# Patient Record
Sex: FEMALE | Race: NATIVE HAWAIIAN OR OTHER PACIFIC ISLANDER
[De-identification: names, ages, dates, MRNs, and addresses within clinical notes are randomized per-mention and may not be internally consistent; named-entity substitution may affect disease eponyms.]

---

## 2018-10-15 ENCOUNTER — HOSPITAL ENCOUNTER (OUTPATIENT)
Dept: HOSPITAL 75 - RAD | Age: 28
End: 2018-10-15
Attending: FAMILY MEDICINE
Payer: COMMERCIAL

## 2018-10-15 DIAGNOSIS — Z3A.16: ICD-10-CM

## 2018-10-15 DIAGNOSIS — Z36.89: Primary | ICD-10-CM

## 2018-10-15 PROCEDURE — 76801 OB US < 14 WKS SINGLE FETUS: CPT

## 2018-10-15 NOTE — DIAGNOSTIC IMAGING REPORT
PROCEDURE: US OB SINGLE FETUS <14 WKS.



TECHNIQUE: Multiple real-time grayscale images were obtained over

the gravid uterus in various projections. 



INDICATION: Assessment of fetal date.



FINDINGS: Intrauterine gestation is seen in variable

presentation. Amniotic fluid index is unremarkable. Fetal cardiac

activity is present with a rate of 142 beats per minute. There is

no evidence of placenta previa. No placental hematoma is

identified. Maternal ovaries are not visualized. Fetal biometry

indicates gestational age of 16 weeks 1 day.



IMPRESSION: Unremarkable obstetric ultrasound with estimated

gestational age of 16 weeks 1 day. Sonographic EDC is 03/31/2019.

Anatomic survey could be performed later in the second trimester.



Dictated by: 



  Dictated on workstation # [id number]

## 2018-10-31 ENCOUNTER — HOSPITAL ENCOUNTER (EMERGENCY)
Dept: HOSPITAL 75 - ER | Age: 28
Discharge: HOME | End: 2018-10-31
Payer: SELF-PAY

## 2018-10-31 VITALS — HEIGHT: 65 IN | BODY MASS INDEX: 26.66 KG/M2 | WEIGHT: 160 LBS

## 2018-10-31 VITALS — DIASTOLIC BLOOD PRESSURE: 72 MMHG | SYSTOLIC BLOOD PRESSURE: 113 MMHG

## 2018-10-31 DIAGNOSIS — O23.41: ICD-10-CM

## 2018-10-31 DIAGNOSIS — O20.9: Primary | ICD-10-CM

## 2018-10-31 DIAGNOSIS — Z3A.17: ICD-10-CM

## 2018-10-31 LAB
ALBUMIN SERPL-MCNC: 4 GM/DL (ref 3.2–4.5)
ALP SERPL-CCNC: 60 U/L (ref 40–136)
ALT SERPL-CCNC: 14 U/L (ref 0–55)
APTT PPP: YELLOW S
BACTERIA #/AREA URNS HPF: (no result) /HPF
BASOPHILS # BLD AUTO: 0 10^3/UL (ref 0–0.1)
BASOPHILS NFR BLD AUTO: 0 % (ref 0–10)
BILIRUB SERPL-MCNC: 0.2 MG/DL (ref 0.1–1)
BILIRUB UR QL STRIP: NEGATIVE
BUN/CREAT SERPL: 11
CALCIUM SERPL-MCNC: 9.2 MG/DL (ref 8.5–10.1)
CAOX CRY #/AREA URNS LPF: (no result) /LPF
CHLORIDE SERPL-SCNC: 107 MMOL/L (ref 98–107)
CO2 SERPL-SCNC: 21 MMOL/L (ref 21–32)
CREAT SERPL-MCNC: 0.66 MG/DL (ref 0.6–1.3)
EOSINOPHIL # BLD AUTO: 0.3 10^3/UL (ref 0–0.3)
EOSINOPHIL NFR BLD AUTO: 4 % (ref 0–10)
ERYTHROCYTE [DISTWIDTH] IN BLOOD BY AUTOMATED COUNT: 19.5 % (ref 10–14.5)
FIBRINOGEN PPP-MCNC: (no result) MG/DL
GFR SERPLBLD BASED ON 1.73 SQ M-ARVRAT: > 60 ML/MIN
GLUCOSE SERPL-MCNC: 80 MG/DL (ref 70–105)
GLUCOSE UR STRIP-MCNC: NEGATIVE MG/DL
HCT VFR BLD CALC: 25 % (ref 35–52)
HGB BLD-MCNC: 7.4 G/DL (ref 11.5–16)
KETONES UR QL STRIP: (no result)
LEUKOCYTE ESTERASE UR QL STRIP: (no result)
LYMPHOCYTES # BLD AUTO: 1.8 X 10^3 (ref 1–4)
LYMPHOCYTES NFR BLD AUTO: 26 % (ref 12–44)
MANUAL DIFFERENTIAL PERFORMED BLD QL: NO
MCH RBC QN AUTO: 21 PG (ref 25–34)
MCHC RBC AUTO-ENTMCNC: 29 G/DL (ref 32–36)
MCV RBC AUTO: 71 FL (ref 80–99)
MONOCYTES # BLD AUTO: 0.5 X 10^3 (ref 0–1)
MONOCYTES NFR BLD AUTO: 8 % (ref 0–12)
NEUTROPHILS # BLD AUTO: 4.3 X 10^3 (ref 1.8–7.8)
NEUTROPHILS NFR BLD AUTO: 62 % (ref 42–75)
NITRITE UR QL STRIP: NEGATIVE
PH UR STRIP: 5 [PH] (ref 5–9)
PLATELET # BLD: 441 10^3/UL (ref 130–400)
PMV BLD AUTO: 9 FL (ref 7.4–10.4)
POTASSIUM SERPL-SCNC: 3.5 MMOL/L (ref 3.6–5)
PROT SERPL-MCNC: 7.5 GM/DL (ref 6.4–8.2)
PROT UR QL STRIP: (no result)
RBC # BLD AUTO: 3.59 10^6/UL (ref 4.35–5.85)
RBC #/AREA URNS HPF: (no result) /HPF
SODIUM SERPL-SCNC: 138 MMOL/L (ref 135–145)
SP GR UR STRIP: 1.03 (ref 1.02–1.02)
SQUAMOUS #/AREA URNS HPF: (no result) /HPF
UROBILINOGEN UR-MCNC: 1 MG/DL
WBC # BLD AUTO: 6.9 10^3/UL (ref 4.3–11)
WBC #/AREA URNS HPF: (no result) /HPF

## 2018-10-31 PROCEDURE — 87077 CULTURE AEROBIC IDENTIFY: CPT

## 2018-10-31 PROCEDURE — 86901 BLOOD TYPING SEROLOGIC RH(D): CPT

## 2018-10-31 PROCEDURE — 82728 ASSAY OF FERRITIN: CPT

## 2018-10-31 PROCEDURE — 80053 COMPREHEN METABOLIC PANEL: CPT

## 2018-10-31 PROCEDURE — 81000 URINALYSIS NONAUTO W/SCOPE: CPT

## 2018-10-31 PROCEDURE — 86900 BLOOD TYPING SEROLOGIC ABO: CPT

## 2018-10-31 PROCEDURE — 36415 COLL VENOUS BLD VENIPUNCTURE: CPT

## 2018-10-31 PROCEDURE — 83540 ASSAY OF IRON: CPT

## 2018-10-31 PROCEDURE — 84702 CHORIONIC GONADOTROPIN TEST: CPT

## 2018-10-31 PROCEDURE — 85025 COMPLETE CBC W/AUTO DIFF WBC: CPT

## 2018-10-31 PROCEDURE — 87088 URINE BACTERIA CULTURE: CPT

## 2018-10-31 NOTE — XMS REPORT
[facility]

 Created on: 2018



Leida Stanton

External Reference #: [id number]

: 1990

Sex: Female



Demographics







 Address  [address] [place]  [address]

 

 Preferred Language  Unknown

 

 Marital Status  Unknown

 

 [employer] Affiliation  Unknown

 

 Race  Unknown

 

 Ethnic Group  Unknown





Author







 Author  TRIPP ALSTON

 

 Organization  [facility]

 

 Address  [address] [place]  [address]



 

 Phone  [phone]







Care Team Providers







 Care Team Member Name  Role  Phone

 

 TRIPP ALSTON  Unavailable  [phone]







PROBLEMS







 Type  Condition  ICD9-CM Code  ALY88-RS Code  Onset Dates  Condition Status  
SNOMED Code

 

 Problem  Encounter for insertion of intrauterine contraceptive device     
Z30.430     Active  16702140

 

 Problem  Bug bites     W57.XXXA     Active  [id number]

 

 Problem  Itching     L29.9     Active  [id number]







ALLERGIES

No Known Allergies



ENCOUNTERS







 Encounter  Location  Date  Diagnosis

 

 [facility]  [address] [phone]    Irritant contact dermatitis due to other chemical products 
L24.5

 

 [facility]  [id number] [place] [address]-
7315    Frequent headaches R51

 

 [facility]  [address] [address]    Frequent headaches R51

 

 [facility]  [id number] [place] [address]    Other viral agents as the cause of diseases classified 
elsewhere B97.89 and Acute upper respiratory infection, unspecified J06.9

 

 [facility]  [id number] [place] [address]-
2014    Pyelonephritis N12

 

 [place] [address] [address]    Leukorrhea N89.8 ; Acute vaginitis N76.0 and Other 
specified bacterial agents as the cause of diseases classified elsewhere B96.89

 

 [place] [address] [address]-
76[address]  29 Mar, 2016  Right ovarian cyst N83.20

 

 [place] [id number] [place] [address]-
4728  04 2016   

 

 [place] [address] [address]-
3950     

 

 [place] [address] [address]-
8053    Right ovarian cyst N83.20

 

 [place] [address] [address]-
1688    Encounter for insertion of intrauterine contraceptive 
device Z30.430

 

 [address] [address]-
2749  07 2016  Encounter for counseling regarding contraception Z30.9

 

 [address] [address]-
4[address]     

 

 [place] [address] [address]-
6112  18 Dec, 2015  Well woman exam Z01.419 ; Weight gain R63.5 and BMI 27.0-
27.9,adult Z68.27

 

 [address] [address]-
9812  18 Dec, 2015  Erythema nodosum L52 and Fatigue R53.83

 

 [address] [address]-
9354  16 Dec, 2015  Erythema nodosum L52

 

 [address] [address]-
9475  30 2015  General counseling and advice for contraceptive management 
Z30.09 and OCP (oral contraceptive pills) initiation Z30.011

 

 [address] [address]-
9505  27 2015  Bug bites W57.XXXA and Itching L29.9

 

 [address] [address]-
7270    Well woman exam Z01.419 ; Weight gain R63.5 and BMI 27.0-
27.9,adult Z68.27

 

 [facility]  [id number] [place] [address]-
7346  27 Oct, 2015  Ankle pain, left M25.572

 

 [facility]  [id number] [place] [address] 
[id number]  12 May, 2015  Dental examination V72.2

 

 [facility]  [address] 
[id number]  08 May, 2015  Dental examination V72.2

 

 [facility]  [id number] [place] [address]-
1604     

 

 [facility]  [id number] [place] [address] 228731-
0776     

 

 [facility]  [id number] [place] [address] 901797-
2379     

 

 [facility]  [id number] [place] [address]-
1433     

 

 [facility]  [id number] [place] [address]-
2399     

 

 [facility]  [id number] [place] [address]-
7168     

 

 [facility]  [id number] [place] [address]-
6691     

 

 [facility]  [id number] [place] [address] 012002-
3086     

 

 [facility]  [id number] [place] [address]-
2918  09 Oct, 2014   

 

 [facility]  [id number] [place] [address]-
6946  09 Oct, 2014   

 

 [facility]  [id number] [place] [address] 512755-
2895  29 Sep, 2014   

 

 [facility] [place] [facility]  [address] [id number] [place] [address]-
5761  29 Sep, 2014   

 

 [facility] [place] [facility]  [address] [id number] [place] [address]-
1103  22 Sep, 2014   

 

 [facility] [place] [facility]  [address] [id number] [place] [address]-
4732  22 Sep, 2014   

 

 [facility] [place] [facility]  [address] [id number] [place] [address]-
7406  22 Aug, 2014   

 

 [facility] [place] [facility]  [address] [id number] [place] [address]-
9695  22 Aug, 2014   

 

 [facility] [place] [facility]  [address] [id number] [place] [address]-
1007  19 May, 2014   

 

 [facility] [place] [facility]  [address] [id number] [place] [address]-
6534  19 May, 2014   

 

 [facility] [place] [facility]  [address] [id number] [place] [address]-
7112  10 Apr, 2014   

 

 [facility] [place] [facility]  [address] [id number] [place] [address]-
7549  10 Apr, 2014   

 

 [facility] [place] [facility]  [address] [id number] [place] [address]-
9216     

 

 [facility] [place] [facility]  [address] [id number] [place] [address]-
0383     

 

 [facility] [place] [facility]  [address] [id number] [place] [address]-
5032     

 

 [facility] [place] [facility]  [address] [id number] [place] [address]-
8945     

 

 [facility] [place] [facility]  [address] [id number] [place] [address]-
8673  10 Feb, 2014   

 

 [facility] [place] [facility]  [address] [id number] [place] [address]-
3690  10 Feb, 2014   

 

 [facility] [place] [facility]  [address] [id number] [place] [address]-
8649     

 

 [facility]  [address] [id number] [place] [address]-
0334     

 

 [facility] [place] [facility]  [address] [id number] [place] [address]-
4363     

 

 [facility]  [address] [id number] [place] [address]-
1666     

 

 [facility] [place] [facility]  [address] [id number] [place] [address]-
3706     

 

 [facility]  [address] [id number] [place] [address]-
0764     

 

 [facility] [place] [facility]  [address] [id number] [place] [address]-
6874     

 

 [facility]  [address] [id number] [place] [address]-
7540     

 

 [facility]  [address] [id number] [place] [address]-
3429     

 

 [facility] [place] [facility]  [address] [id number] [place] [address]-
5640     

 

 [facility]  [address] [id number] [place] [address]-
3984     

 

 [facility] [place] [facility]  [address] [id number] [place] [address]-
2738     

 

 [facility] [place] [facility]  [address] [id number] [place] [address]-
7932  28 Mar, 2013   

 

 [facility] [place] [facility]  [address] [id number] [place] [address]-
1703  25 Mar, 2013   

 

 [facility] [place] [facility]  [address] [id number] [place] [address]-
8721  07 Mar, 2013   

 

 [facility] [place] [facility]  [address] [id number] [place] [address]-
1436  04 Mar, 2013   

 

 [facility]  [id number] [address]
2546  01 Mar, 2013   

 

 [facility]  [address] [address]-
1816     

 

 [facility]  [address] [address]-
7846     

 

 [facility]  [address] [address]
2546     

 

 [facility]  [address] [address]-
3279  25 Oct, 2012   

 

 [facility]  [address] [address]-
3246  25 Oct, 2012   

 

 [facility]  [address] [address]
2546  03 Oct, 2012   

 

 [facility]  [address] [address]-
6666     

 

 [facility]  [id number] [address]-
6316     







IMMUNIZATIONS

No Known Immunizations



SOCIAL HISTORY

Never Assessed



REASON FOR VISIT

rash on left middle finger that itches for 2 weeks. [id number]



PLAN OF CARE







 Activity  Details









  









 Follow Up  1 Week, prn Reason:if symptoms worsen or not improving







VITAL SIGNS







 Height  62 in  2018

 

 Weight  146.6 lbs  2018

 

 Temperature  98.4 degrees Fahrenheit  2018

 

 Heart Rate  82 bpm  2018

 

 Respiratory Rate  20   2018

 

 BMI  26.81 kg/m2  2018

 

 Blood pressure systolic  124 mmHg  2018

 

 Blood pressure diastolic  76 mmHg  2018







MEDICATIONS







 Medication  Instructions  Dosage  Frequency  Start Date  End Date  Duration  
Status

 

 Propranolol HCl 40 mg  Orally Twice a day  1 tablet  12h       30 
day(s)  Not-Taking

 

 Cetirizine HCl 10 mg  Orally Once a day  1 tablet  24h    07 days  Active

 

 Betamethasone Dipropionate Aug 0.05 %  Externally Once a day  1 application to 
affected area  24h    07 days  Active







RESULTS

No Results



PROCEDURES

No Known procedures



INSTRUCTIONS





MEDICATIONS ADMINISTERED

No Known Medications



MEDICAL (GENERAL) HISTORY







 Type  Description  Date

 

 Hospitalization History  childbirth

## 2018-10-31 NOTE — XMS REPORT
[facility]

 Created on: 2015



Leida Ridley

External Reference #: [id number]

: 1990

Sex: Female



Demographics







 Address  [address] [place]  [address]

 

 Home Phone  [phone]

 

 Preferred Language  Unknown

 

 Marital Status  Unknown

 

 [employer] Affiliation  Unknown

 

 Race  White

 

 Ethnic Group   or 





Author







 Author  JODEE KAT

 

 Organization  [employer]

 

 Address  Unknown

 

 Phone  Unavailable







Care Team Providers







 Care Team Member Name  Role  Phone

 

 JODEE KAT  CP  Unavailable



                                                                



Allergies, Adverse Reactions, Alerts

          





 Substance  Reaction  Event Type

 

 N.K.D.A.  Info Not Available  Non Drug Allergy



                                                                               
         



Problems

          





 Problem Type  Condition  Code  Onset Dates  Condition Status

 

 Problem  Screening for malignant neoplasm of the cervix  V76.2     Active

 

 Problem  Surveillance of other previously prescribed contraceptive method  
V25.49     Active

 

 Problem  Screening examination for venereal disease  V74.5     Active

 

 Problem  Other dyspnea and respiratory abnormalities  786.09     Active

 

 Problem  Unspecified anemia  285.9     Active

 

 Problem  Other malaise and fatigue  780.79     Active

 

 Problem  Contact dermatitis and other eczema, due to unspecified cause  692.9 
    Active

 

 Problem  Unspecified contraceptive management  V25.9     Active

 

 Problem  Encounter for removal of intrauterine contraceptive device  V25.12   
  Active

 

 Problem  Other general counseling and advice for contraceptive management  
V25.09     Active

 

 Assessment  Ankle pain, left  M25.572     Active

 

 Problem  Cellulitis and abscess of leg, except foot  682.6     Active

 

 Problem  Headache  784.0     Active

 

 Problem  Cellulitis and abscess of buttock  682.5     Active

 

 Problem  Screening examination for pulmonary tuberculosis  V74.1     Active

 

 Problem  Unspecified symptom associated with female genital organs  625.9     
Active

 

 Problem  Lumbago  724.2     Active



                                                                               
                                                                               
                                                                               
           



Medications

          No Known Medications                                                 
                                       



Procedures

          





 Procedure  Coding System  Code  Date

 

 Office Visit, Est Pt., Level 3  CPT-4  64559  Oct 27, 2015



                                                                               
                   



Vital Signs

          





 Date/Time:  Oct 27, 2015

 

 Temperature  97.9 F

 

 Weight  148.3 lbs

 

 Height  62 in

 

 BMI  27.12 Index

 

 Blood Pressure Diastolic  74 mmHg

 

 Blood Pressure Systolic  130 mmHg

 

 Cardiac Monitoring Heart Rate  84 bpm



                                                                              



Results

          No Known Results                                                     
               



Summary Purpose

          [employer] Submission

## 2018-10-31 NOTE — XMS REPORT
[facility]

 Created on: 2018



Leida Stanton

External Reference #: [id number]

: 1990

Sex: Female



Demographics







 Address  [address] [place]  [address]

 

 Preferred Language  Unknown

 

 Marital Status  Unknown

 

 Adventist Affiliation  Unknown

 

 Race  Unknown

 

 Ethnic Group  Unknown





Author







 Author  GRANT MILLARD

 

 Organization  [facility]

 

 Address  [address] [place]  [address]



 

 Phone  [phone]







Care Team Providers







 Care Team Member Name  Role  Phone

 

 GRANT MILLARD  Unavailable  [phone]







PROBLEMS







 Type  Condition  ICD9-CM Code  ZHO40-EQ Code  Onset Dates  Condition Status  
SNOMED Code

 

 Problem  Encounter for insertion of intrauterine contraceptive device     
Z30.430     Active  37938778

 

 Problem  Bug bites     W57.XXXA     Active  037345546

 

 Problem  Itching     L29.9     Active  908979211







ALLERGIES

No Information



ENCOUNTERS







 Encounter  Location  Date  Diagnosis

 

 [place] [id number] [place] [address] 17182-
4596    Frequent headaches R51

 

 [facility]  [id number] [place] [address] 34560
-[phone]    Frequent headaches R51

 

 [facility]  [id number] [place] [address] 81107
-[phone]    Other viral agents as the cause of diseases classified 
elsewhere B97.89 and Acute upper respiratory infection, unspecified J06.9

 

 [place] [id number] [place] [address]65-
0199    Pyelonephritis N12

 

 [place] [id number] [place] [address] 88159-
5101    Leukorrhea N89.8 ; Acute vaginitis N76.0 and Other 
specified bacterial agents as the cause of diseases classified elsewhere B96.89

 

 [place] [id number] [place] [address] 39696-
2188  29 Mar, 2016  Right ovarian cyst N83.20

 

 [place] [id number] [place] [address] 10259-
2618     

 

 [place] [id number] [place] [address] 30565-
9329  27 2016   

 

 [place] [id number] [place] [address] 870[address]-
9150  13 2016  Right ovarian cyst N83.20

 

 [address] [address]-
7410  11 2016  Encounter for insertion of intrauterine contraceptive 
device Z30.430

 

 [address] [address]-
6092  07 2016  Encounter for counseling regarding contraception Z30.9

 

 [address] [address]-
9598  04 2016   

 

 [place] [address] [address]-
3734  18 Dec, 2015  Well woman exam Z01.419 ; Weight gain R63.5 and BMI 27.0-
27.9,adult Z68.27

 

 [address] [address]-
9298  18 Dec, 2015  Erythema nodosum L52 and Fatigue R53.83

 

 [address] [address]-
4263  16 Dec, 2015  Erythema nodosum L52

 

 [address] [address]-
0458  30 2015  General counseling and advice for contraceptive management 
Z30.09 and OCP (oral contraceptive pills) initiation Z30.011

 

 [place] [id number] [place] [address] 60881-
1472  27 2015  Bug bites W57.XXXA and Itching L29.9

 

 [address] [address]-
1524  19 2015  Well woman exam Z01.419 ; Weight gain R63.5 and BMI 27.0-
27.9,adult Z68.27

 

 [address] [address]-
7096  27 Oct, 2015  Ankle pain, left M25.572

 

 [facility]  [address] [id number] 
[id number]  12 May, 2015  Dental examination V72.2

 

 [facility]  [address] 
[id number]  08 May, 2015  Dental examination V72.2

 

 [facility]  [id number] [place] [address] 876[address]-
0696     

 

 [facility]  [address] [id number][place] [address]-
2546     

 

 [facility]  [address] [id number] [place] [address] 83917-
3286     

 

 [facility]  [id number] [address]-
2546     

 

 [facility]  [id number] [address]-
9156     

 

 [facility]  [id number] [address]-
6706     

 

 [facility]  [id number] [address]-
7166     

 

 [facility]  [address]65100[place] [address]-
6016     

 

 [facility]  [address] [facility] [id number][place] [address]-
2546  09 Oct, 2014   

 

 [facility]  [address] [facility] [id number][place] [address]-
2546  09 Oct, 2014   

 

 [facility]  [address] [facility] [id number][place] [address]-
2546  29 Sep, 2014   

 

 [facility]  [address] [facility] [id number][place] [address]-
2546  29 Sep, 2014   

 

 [facility]  [address] [id number] [address]-
5665  22 Sep, 2014   

 

 [facility] [place] [facility]  [address] [id number] [place] [address]-
9201  22 Sep, 2014   

 

 [facility] [place] [facility]  [address] [id number] [place] [address]-
3993  22 Aug, 2014   

 

 [facility] [place] [facility]  [address] [id number] [place] [address]-
1271  22 Aug, 2014   

 

 [facility] [place] [facility]  [address] [id number] [place] [address]-
9745  19 May, 2014   

 

 [facility] [place] [facility]  [address] [id number] [place] [address]-
0757  19 May, 2014   

 

 [facility] [place] [facility]  [address] [id number] [place] [address]-
6602  10 Apr, 2014   

 

 [facility] [place] [facility]  [id number] [address]-
3729  10 Apr, 2014   

 

 [facility] [place] [facility]  [address] [id number] [place] [address]-
3961     

 

 [facility] [place] [facility]  [address] [id number] [place] [address]-
9458     

 

 [facility] [place] [facility]  [address] [facility] [id number] [place] [address]-
0176     

 

 [facility] [place] [facility]  [address] [id number] [place] [address]-
3252     

 

 [facility] [place] [facility]  [address] [id number][place] [address]-
6577  10 Feb, 2014   

 

 [facility] [place] [facility]  [address] [id number] [place] [address]-
5717  10 Feb, 2014   

 

 [facility] [place] [facility]  [address] [id number] [place] [address]-
1462     

 

 [facility] [place] [facility]  [address] [id number] [place] [address]-
0890     

 

 [facility] [place] [facility]  [address] [id number] [place] [address]-
1645     

 

 [facility]  [address] [id number] [place] [address]-
1773     

 

 [facility]  [address] [id number] [place] [address]-
8639     

 

 [facility]  [address] [id number] [place] [address]-
3104     

 

 [facility]  [address] [id number] [place] [address]-
4871     

 

 [facility]  [address] [id number] [place] [address]-
6740     

 

 [facility]  [address] [id number] [place] [address]-
6247     

 

 [facility]  [address] [id number] [place] [address]-
2499     

 

 [facility]  [address] [id number] [place] [address]-
9103     

 

 [facility]  [address] [id number] [place] [address]-
8142     

 

 [facility]  [address] [id number] [place] [address]-
9672  28 Mar, 2013   

 

 [facility]  [address] [id number] [place] [address]-
5437  25 Mar, 2013   

 

 [facility] [place] [facility]  [address] [id number] [place] [address]-
3186  07 Mar, 2013   

 

 [facility] [place] [facility]  [address] [id number] [place] [address]-
1979  04 Mar, 2013   

 

 [facility] [place] [facility]  [address] [id number] [place] [address]-
0470  01 Mar, 2013   

 

 [facility] [place] [facility]  [address] [id number][place] [address]-
9988     

 

 [facility]  [address] [id number] [address]-
9431     

 

 [facility]  [address] [id number] [address]-
2556     

 

 [facility]  [address]00565100[place] [address]-
3617  25 Oct, 2012   

 

 [facility]  [id number] [place] [address] 42155-
8264  25 Oct, 2012   

 

 [facility]  [address]00565100[place] [address]-
6591  03 Oct, 2012   

 

 [facility]  [address]00565100[place] [address]-
0174     

 

 [facility]  [address] [id number] [address]-
0110     







IMMUNIZATIONS

No Known Immunizations



SOCIAL HISTORY

Never Assessed



REASON FOR VISIT

Lab (walk-in)--[facility]



PLAN OF CARE





VITAL SIGNS





MEDICATIONS

Unknown Medications



RESULTS







 Name  Result  Date  Reference Range

 

 TSH     2017   

 

 TSH  1.350     0.450-4.500

 

 CBC     2017   

 

 WBC  3.9     3.4-10.8

 

 RBC  4.20     3.77-5.28

 

 Hemoglobin  11.2     11.1-15.9

 

 Hematocrit  35.3     34.0-46.6

 

 MCV  84     79-97

 

 MCH  26.7     26.6-33.0

 

 MCHC  31.7     31.5-35.7

 

 RDW  14.8     12.3-15.4

 

 Platelets  311     150-379

 

 Neutrophils  50      

 

 Lymphs  38      

 

 Monocytes  6      

 

 Eos  5      

 

 Basos  1      

 

 Neutrophils (Absolute)  2.0     1.4-7.0

 

 Lymphs (Absolute)  1.5     0.7-3.1

 

 Monocytes(Absolute)  0.2     0.1-0.9

 

 Eos (Absolute)  0.2     0.0-0.4

 

 Baso (Absolute)  0.0     0.0-0.2

 

 Immature Granulocytes  0      

 

 Immature Grans (Abs)  0.0     0.0-0.1

 

 CMP     2017   

 

 Glucose, Serum  89     65-99

 

 BUN  5     6-20

 

 Creatinine, Serum  0.74     0.57-1.00

 

 eGFR If NonAfricn Am  112         >59

 

 eGFR If Africn Am  129         >59

 

 BUN/Creatinine Ratio  7     9-23

 

 Sodium, Serum  140     134-144

 

 Potassium, Serum  4.0     3.5-5.2

 

 Chloride, Serum  105     

 

 Carbon Dioxide, Total  19     18-29

 

 Calcium, Serum  8.8     8.7-10.2

 

 Protein, Total, Serum  6.8     6.0-8.5

 

 Albumin, Serum  3.8     3.5-5.5

 

 Globulin, Total  3.0     1.5-4.5

 

 A/G Ratio  1.3     1.2-2.2

 

 Bilirubin, Total  0.4     0.0-1.2

 

 Alkaline Phosphatase, S  71     

 

 AST (SGOT)  11     0-40

 

 ALT (SGPT)  7     0-32







PROCEDURES







 Procedure  Date Ordered  Result  Body Site

 

 ASSAY THYROID STIM HORMONE  2017      

 

 COMPLETE CBC W/AUTO DIFF WBC  2017      

 

 VENIPUNCT, ROUTINE*  2017      

 

 COMPREHEN METABOLIC PANEL  2017      







INSTRUCTIONS





MEDICATIONS ADMINISTERED

No Known Medications



MEDICAL (GENERAL) HISTORY







 Type  Description  Date

 

 Hospitalization History  childbirth

## 2018-10-31 NOTE — XMS REPORT
[facility]

 Created on: 2015



Leida Ridley

External Reference #: [id number]

: 1990

Sex: Female



Demographics







 Address  [address] [place]  [address]

 

 Home Phone  [phone]

 

 Preferred Language  Unknown

 

 Marital Status  Unknown

 

 Adventism Affiliation  Unknown

 

 Race  White

 

 Ethnic Group   or 





Author







 Author  RANDI LOUIS

 

 [facility]  eClinicalWorks

 

 Address  Unknown

 

 Phone  Unavailable







Care Team Providers







 Care Team Member Name  Role  Phone

 

 RANDI LOUIS  CP  Unavailable



                                                                



Allergies, Adverse Reactions, Alerts

          





 Substance  Reaction  Event Type

 

 N.K.D.A.  Info Not Available  Non Drug Allergy



                                                                               
         



Problems

          





 Problem Type  Condition  Code  Onset Dates  Condition Status

 

 Problem  Lumbago  M54.5     Active

 

 Problem  Headache  R51     Active

 

 Problem  Anemia, unspecified  D64.9     Active

 

 Assessment  BMI 27.0-27.9,adult  Z68.27     Active

 

 Assessment  Well woman exam  Z01.419     Active

 

 Assessment  Weight gain  R63.5     Active



                                                                               
                                                           



Medications

          No Known Medications                                                 
                                       



Procedures

          





 Procedure  Coding System  Code  Date

 

 Preventive Care Est Pt. Age 18-39  CPT-4  60171  2015

 

 No Charge  CPT-4  94353  2015



                                                                               
                             



Vital Signs

          





 Date/Time:  2015

 

 Temperature  98.0 F

 

 Weight  150.4 lbs

 

 Height  62 in

 

 BMI  27.51 Index

 

 Blood Pressure Diastolic  70 mmHg

 

 Blood Pressure Systolic  110 mmHg

 

 Cardiac Monitoring Heart Rate  78 bpm



                                                                              



Results

          No Known Results                                                     
               



Summary Purpose

          eClinicalWorks Submission

## 2018-10-31 NOTE — XMS REPORT
[facility]

 Created on: 2016



Leida Ridley

External Reference #: [id number]

: 1990

Sex: Female



Demographics







 Address  [address]  [address]

 

 Home Phone  [phone]

 

 Preferred Language  Unknown

 

 Marital Status  Unknown

 

 Jewish Affiliation  Unknown

 

 Race  White

 

 Ethnic Group   or 





Author







 Author  RANDI LOUIS

 

 [facility]  eClinicalWorks

 

 Address  Unknown

 

 Phone  Unavailable







Care Team Providers







 Care Team Member Name  Role  Phone

 

 RANDI LOUIS    Unavailable



                                                                



Allergies

          No Known Allergies                                                   
                                     



Problems

          





 Problem Type  Condition  Code  Onset Dates  Condition Status

 

 Problem  Bug bites  W57.XXXA     Active

 

 Problem  Itching  L29.9     Active

 

 Problem  Encounter for insertion of intrauterine contraceptive device  Z30.430
     Active

 

 Assessment  Right ovarian cyst  N83.20     Active



                                                                               
                                       



Medications

          No Known Medications                                                 
                             



Results

          No Known Results                                                     
               



Summary Purpose

          eClinicalWorks Submission

## 2018-10-31 NOTE — XMS REPORT
[facility]

 Created on: 2018



Leida Stanton

External Reference #: [id number]

: 1990

Sex: Female



Demographics







 Address  [address] [place]  [address]

 

 Preferred Language  Unknown

 

 Marital Status  Unknown

 

 Zoroastrian Affiliation  Unknown

 

 Race  Unknown

 

 Ethnic Group  Unknown





Author







 Author  JAMES DICKEY

 

 [facility]

 

 Address  [address] [place]  [address]



 

 Phone  [phone]







Care Team Providers







 Care Team Member Name  Role  Phone

 

 JAMES DICKEY  Unavailable  [phone]







PROBLEMS







 Type  Condition  ICD9-CM Code  NNL87-KT Code  Onset Dates  Condition Status  
SNOMED Code

 

 Problem  Encounter for insertion of intrauterine contraceptive device     
Z30.430     Active  [id number]

 

 Problem  Bug bites     W57.XXXA     Active  383881441

 

 Problem  Itching     L29.9     Active  809690921







ALLERGIES

No Known Allergies



ENCOUNTERS







 Encounter  Location  Date  Diagnosis

 

 [place] [address] [address]-
[address]  24 Oct, 2018   

 

 [facility]  [address] [address]-
0013  26 Sep, 2018  Multigravida in first trimester Z34.81 and Normal pregnancy 
in multigravida Z34.80

 

 [facility]  [address] [address]-
2190  20 Sep, 2018   

 

 [place] [address] [place] [id number] [place] [address]-
7173  14 Sep, 2018   

 

 [facility]  [address] [address]-
2340  11 Sep, 2018   

 

 [facility]  [id number] [place] [address]-
3223  07 Sep, 2018  Encounter for pregnancy test Z32.00

 

 [facility]  [address] [address]
-[phone]    Irritant contact dermatitis due to other chemical products 
L24.5

 

 [facility]  [id number] [place] [address]-
3061    Frequent headaches R51

 

 [facility]  [address] [place] [address]
-8047    Frequent headaches R51

 

 [facility]  [id number] [place] [address]
-0606    Other viral agents as the cause of diseases classified 
elsewhere B97.89 and Acute upper respiratory infection, unspecified J06.9

 

 [place] [address] [place] [id number] [place] [address] 18692-
8257    Pyelonephritis N12

 

 [place] [address] [place] [address] [id number] [place] [address]-
5182    Leukorrhea N89.8 ; Acute vaginitis N76.0 and Other 
specified bacterial agents as the cause of diseases classified elsewhere B96.89

 

 [place] [address] [place] [id number] [place] [address]-
3662  29 Mar, 2016  Right ovarian cyst N83.20

 

 [place] [address] [place] [id number] [place] [address] 86811-
5700     

 

 [place] [address] [place] [id number] [place] [address]-
1397     

 

 [place] [address] [place] [id number] [place] [address]-
7849    Right ovarian cyst N83.20

 

 [place] [address] [place] [id number] [place] [address]118-
3004    Encounter for insertion of intrauterine contraceptive 
device Z30.430

 

 [place] [address] [place] [id number] [place] [address] 48603-
2508    Encounter for counseling regarding contraception Z30.9

 

 [place] [address] [place] [id number] [place] [address]28-
9037     

 

 [place] [address] [id number] [place] [address]51-
8041  18 Dec, 2015  Well woman exam Z01.419 ; Weight gain R63.5 and BMI 27.0-
27.9,adult Z68.27

 

 [place] [address] [place] [address] [id number] [place] [address] 76351-
6638  18 Dec, 2015  Erythema nodosum L52 and Fatigue R53.83

 

 [place] [address] [address]-
0858  16 Dec, 2015  Erythema nodosum L52

 

 [place] [address] [address]-
8784  30 2015  General counseling and advice for contraceptive management 
Z30.09 and OCP (oral contraceptive pills) initiation Z30.011

 

 [place] [address] [address]-
1250  27 2015  Bug bites W57.XXXA and Itching L29.9

 

 [place] [address] [address]-
8092  19 2015  Well woman exam Z01.419 ; Weight gain R63.5 and BMI 27.0-
27.9,adult Z68.27

 

 [place] [address] [place] [id number] [place] [address] 6[address]10-
4209  27 Oct, 2015  Ankle pain, left M25.572

 

 [facility]  [address] 
[id number]  12 May, 2015  Dental examination V72.2

 

 [facility]  [address] 
[id number]  08 May, 2015  Dental examination V72.2

 

 [place] [address] [place] [id number] [place] [address] 33963-
1015     

 

 [place] [address] [address]-
5230     

 

 [place] [address] [address]-
7234     

 

 [place] [address] [address]-
5973     

 

 [facility] [place] [facility]  [address] [id number] [place] [address]-
3686     

 

 [facility] [place] [facility]  [address] [id number] [place] [address]-
3443     

 

 [facility] [place] [facility]  [address] [id number] [place] [address]-
9338     

 

 [facility] [place] [facility]  [address] [id number] [place] [address]-
5828     

 

 [facility] [place] [facility]  [address] [id number] [place] [address]-
8200  09 Oct, 2014   

 

 [facility] [place] [facility]  [address] [id number] [place] [address]-
4516  09 Oct, 2014   

 

 [facility] [place] [facility]  [address] [id number] [place] [address]-
5776  29 Sep, 2014   

 

 [facility] [place] [facility]  [address] [id number] [place] [address]-
6709  29 Sep, 2014   

 

 [facility] [place] [facility]  [address] [id number] [place] [address]-
5504  22 Sep, 2014   

 

 [facility] [place] [facility]  [address] [id number] [place] [address]-
2809  22 Sep, 2014   

 

 [facility] [place] [facility]  [address] [id number] [place] [address]-
0663  22 Aug, 2014   

 

 [facility] [place] [facility]  [address] [id number][place] [address]-
3306  22 Aug, 2014   

 

 [facility] [place] [facility]  [address] [id number] [place] [address]-
7103  19 May, 2014   

 

 [facility] [place] [facility]  [address] [id number] [place] [address]-
7193  19 May, 2014   

 

 [facility] [place] [facility]  [address] [id number][place] [address]-
2430  10 Apr, 2014   

 

 [facility] [place] [facility]  [address] [id number][place] [address]-
9839  10 Apr, 2014   

 

 [facility] [place] [facility]  [address] [id number] [place] [address]-
1326     

 

 [facility] [place] [facility]  [address] [id number] [place] [address]-
7256     

 

 [facility] [place] [facility]  [address] [id number] [place] [address]-
4856     

 

 [facility] [place] [facility]  [address] [id number] [place] [address]-
9088     

 

 [facility] [place] [facility]  [address] [id number] [place] [address]-
3751  10 Feb, 2014   

 

 [facility] [place] [facility]  [address] [id number] [place] [address]-
5048  10 Feb, 2014   

 

 [facility] [place] [facility]  [address] [id number] [place] [address]-
5107     

 

 [facility] [place] [facility]  [address] [id number] [place] [address]-
5619     

 

 [facility] [place] [facility]  [address] [id number] [place] [address]-
1551     

 

 [facility] [place] [facility]  [address] [id number] [place] [address]-
4943     

 

 [facility] [place] [facility]  [address] [id number] [place] [address]-
5281     

 

 [facility] [place] [facility]  [address] [id number] [place] [address]-
0000     

 

 [facility] [place] [facility]  [address] [id number] [place] [address]-
3468     

 

 [facility] [place] [facility]  [address] [id number] [place] [address]-
9396     

 

 [facility] [place] [facility]  [address] [id number] [place] [address]-
8815     

 

 [facility] [place] [facility]  [address] [id number] [place] [address]-
4468     

 

 [facility] [place] [facility]  [address] [id number] [place] [address]-
6505     

 

 [facility] [place] [facility]  [address] [id number] [place] [address]-
9350     

 

 [facility] [place] [facility]  [address] [id number] [place] [address]-
0193  28 Mar, 2013   

 

 [facility] [place] [facility]  [address] [id number] [place] [address]-
3821  25 Mar, 2013   

 

 [facility] [place] [facility]  [address] [id number] [place] [address]-
9809  07 Mar, 2013   

 

 [facility]  [address] [id number] [place] [address]-
3895  04 Mar, 2013   

 

 [facility] [place] [facility]  [address] [id number] [place] [address]-
4675  01 Mar, 2013   

 

 [facility] [place] [facility]  [address] [id number] [place] [address]-
3502     

 

 [facility] [place] [facility]  [address] [id number] [place] [address]-
3243     

 

 [facility] [place] [facility]  [address] [id number] [place] [address]-
9287     

 

 [facility] [place] [facility]  [address] [id number], [place] [address]-
5470  25 Oct, 2012   

 

 [facility] [place] [facility]  [address] [id number] [place] [address]-
0494  25 Oct, 2012   

 

 [facility] [place] [facility]  [address] [id number] [place] [address]-
4416  03 Oct, 2012   

 

 [facility] [place] [facility]  [address] [id number], [place] [address]-
9862     

 

 [facility] [place] [facility]  [address] [id number], [place] [address]-
6751     







IMMUNIZATIONS

No Known Immunizations



SOCIAL HISTORY

Never Assessed



REASON FOR VISIT

OB HX 



PLAN OF CARE





VITAL SIGNS





MEDICATIONS







 Medication  Instructions  Dosage  Frequency  Start Date  End Date  Duration  
Status

 

 Prenatal                    Active

 

 Propranolol HCl 40 mg  Orally Twice a day  1 tablet  12h       30 
day(s)  Unknown







RESULTS

No Results



PROCEDURES

No Known procedures



INSTRUCTIONS





MEDICATIONS ADMINISTERED

No Known Medications



MEDICAL (GENERAL) HISTORY







 Type  Description  Date

 

 Surgical History  No know Surgical history   

 

 Hospitalization History  childbirth

## 2018-10-31 NOTE — XMS REPORT
[facility]

 Created on: 2016



Leida Ridley

External Reference #: [id number]

: 1990

Sex: Female



Demographics







 Address  [address] [place]  [address]

 

 Home Phone  [phone]

 

 Preferred Language  Unknown

 

 Marital Status  Unknown

 

 Anabaptism Affiliation  Unknown

 

 Race  White

 

 Ethnic Group   or 





Author







 PRIMITIVO Farah

 

 [facility]  [employer]

 

 Address  Unknown

 

 Phone  Unavailable







Care Team Providers







 Care Team Member Name  Role  Phone

 

 PRIMITIVO OSBORNE  CP  Unavailable



                                                                



Allergies, Adverse Reactions, Alerts

          





 Substance  Reaction  Event Type

 

 N.K.D.A.  Info Not Available  Non Drug Allergy



                                                                               
         



Problems

          





 Problem Type  Condition  Code  Onset Dates  Condition Status

 

 Problem  Bug bites  W57.XXXA     Active

 

 Problem  Itching  L29.9     Active

 

 Problem  Encounter for insertion of intrauterine contraceptive device  Z30.430
     Active

 

 Assessment  Other specified bacterial agents as the cause of diseases 
classified elsewhere  B96.89     Active

 

 Assessment  Leukorrhea  N89.8     Active

 

 Assessment  Acute vaginitis  N76.0     Active



                                                                               
                                                           



Medications

          





 Medication  Code System  Code  Instructions  Start Date  End Date  Status  
Dosage

 

 Flagyl  NDC  [address]-55  500 MG Orally 2 times a day  2016  May 02
, 2016     1 tablet



                                                                               
         



Procedures

          





 Procedure  Coding System  Code  Date

 

 TRICHOMONAS ASSAY W/OPTIC  CPT-4  72667  2016

 

 No Charge  CPT-4  61267  2016

 

 ALVA VAG, DNA, DIR PROBE  CPT-4  71772  2016

 

 Office Visit, Est Pt., Level 3  CPT-4  20055  2016

 

 CULTURE, BACTERIA, OTHER  CPT-4  63384  2016



                                                                               
                                                           



Vital Signs

          





 Date/Time:  2016

 

 Temperature  97.6 F

 

 Weight  156.8 lbs

 

 Height  62 in

 

 BMI  28.68 Index

 

 Blood Pressure Diastolic  77 mmHg

 

 Blood Pressure Systolic  125 mmHg

 

 Cardiac Monitoring Heart Rate  82 bpm



                                                                              



Results

          No Known Results                                                     
               



Summary Purpose

          [employer] Submission

## 2018-10-31 NOTE — XMS REPORT
[facility]

 Created on: 2015



Leida Ridley

External Reference #: [id number]

: 1990

Sex: Female



Demographics







 [address]  [address]

 

 Home Phone  [phone]

 

 Preferred Language  Unknown

 

 Marital Status  Unknown

 

 Episcopal Affiliation  Unknown

 

 Race  White

 

 Ethnic Group   or 





Author







 Author  ILDEFONSO LEE

 

 Organization  [employer]

 

 Address  Unknown

 

 Phone  Unavailable







Care Team Providers







 Care Team Member Name  Role  Phone

 

 ILDEFONSO LEE  CP  Unavailable



                                                                



Allergies, Adverse Reactions, Alerts

          





 Substance  Reaction  Event Type

 

 N.K.D.A.  Info Not Available  Non Drug Allergy



                                                                               
         



Problems

          





 Problem Type  Condition  Code  Onset Dates  Condition Status

 

 Problem  Itching  L29.9     Active

 

 Assessment  Erythema nodosum  L52     Active

 

 Problem  Bug bites  W57.XXXA     Active

 

 Assessment  Fatigue  R53.83     Active



                                                                               
                                       



Medications

          





 Medication  Code System  Code  Instructions  Start Date  End Date  Status  
Dosage

 

 Balziva  NDC  [address]-47  0.4-35 MG-MCG Orally Once a day  2015    
    1 tablet

 

 Vistaril  NDC  [address]-54  25 MG Orally every 8 hrs  2015        1 
capsule as needed

 

 Ibuprofen  NDC  [address]-07  800 MG Orally Three times a day  Dec 16, 2015  
2016     1 tablet

 

 Triamcinolone Acetonide  NDC  [address]-15  0.1 % Externally Twice a day  2015        1 application to affected area



                                                                               
                                       



Procedures

          





 Procedure  Coding System  Code  Date

 

 HETEROPHILE ANTIBODIES  CPT-4  10656  Dec 18, 2015

 

 Office Visit, Est Pt., Level 3  CPT-4  61500  Dec 18, 2015

 

 C-REACTIVE PROTEIN  CPT-4  80562  Dec 18, 2015

 

 VENIPUNCT, ROUTINE*  CPT-4  65001  Dec 18, 2015

 

 CHEST X-RAY  CPT-4  19171  Dec 18, 2015



                                                                               
                                                           



Vital Signs

          





 Date/Time:  Dec 18, 2015

 

 Temperature  98.0 F

 

 Weight  157.0 lbs

 

 Height  62 in

 

 BMI  28.71 Index

 

 Blood Pressure Diastolic  68 mmHg

 

 Blood Pressure Systolic  122 mmHg

 

 Cardiac Monitoring Heart Rate  72 bpm



                                                                    



Results

          





 Name  Result  Date  Reference Range  Unit  Abnormality Flag

 

 ROUTINE VENIPUNCTURE               



                                                                    



Summary Purpose

          [employer]Clinical[employer] Submission

## 2018-10-31 NOTE — XMS REPORT
[facility]

 Created on: 2016



Leida Ridley

External Reference #: [id number]

: 1990

Sex: Female



Demographics







 Address  [address] [place]  [address]

 

 Home Phone  [phone]

 

 Preferred Language  Unknown

 

 Marital Status  Unknown

 

 Scientology Affiliation  Unknown

 

 Race  Unreported/Refused to Report

 

 Ethnic Group   or 





Author







 Author  JODEE KAT

 

 Organization  [employer]

 

 Address  Unknown

 

 Phone  Unavailable







Care Team Providers







 Care Team Member Name  Role  Phone

 

 JODEE KAT  CP  Unavailable



                                                                



Allergies, Adverse Reactions, Alerts

          





 Substance  Reaction  Event Type

 

 N.K.D.A.  Info Not Available  Non Drug Allergy



                                                                               
         



Problems

          





 Problem Type  Condition  Code  Onset Dates  Condition Status

 

 Assessment  Pyelonephritis  N12     Active



                                                                               
         



Medications

          





 Medication  Code System  Code  Instructions  Start Date  End Date  Status  
Dosage

 

 Ceftin  NDC  [address]-68  500 MG Orally Twice a day  July 10, 2016  July 20, 
2016     1 tablet

 

 Zofran  NDC  [address]-31  4 MG Orally every 8 hours, PRN  2016     
   1 tablets

 

 Cipro  NDC  [address]-87  500 MG Orally Twice a day  July 10, 2016  July 20, 
2016     1 tablet

 

 Meclizine HCl  NDC  [address]-10  25 MG Orally Once a day           1 tablet 
as needed



                                                                               
                                       



Procedures

          





 Procedure  Coding System  Code  Date

 

 Office Visit, Est Pt., Level 3  CPT-4  62641  2016



                                                                               
                   



Vital Signs

          





 Date/Time:  2016

 

 Cardiac Monitoring Heart Rate  120 bpm

 

 Weight  149.5 lbs

 

 Height  65 in

 

 BMI  24.88 Index

 

 Blood Pressure Diastolic  68 mmHg

 

 Blood Pressure Systolic  102 mmHg



                                                                              



Results

          No Known Results                                                     
               



Summary Purpose

          eClinicalWorks Submission

## 2018-10-31 NOTE — XMS REPORT
[facility]

 Created on: 10/13/2018



Leida Stanton

External Reference #: [id number]

: 1990

Sex: Female



Demographics







 Address  [address] [place]  [address]

 

 Preferred Language  Unknown

 

 Marital Status  Unknown

 

 Taoist Affiliation  Unknown

 

 Race  Unknown

 

 Ethnic Group  Unknown





Author







 Author  ROBERTA  SONU

 

 [facility]

 

 Address  [address] [place]  [address]



 

 Phone  [phone]







Care Team Providers







 Care Team Member Name  Role  Phone

 

 SONU GRANADOS  Unavailable  [phone]







PROBLEMS







 Type  Condition  ICD9-CM Code  HBB50-SM Code  Onset Dates  Condition Status  
SNOMED Code

 

 Problem  Microcytic anemia     D50.9     Active  494192017

 

 Problem  Encounter for insertion of intrauterine contraceptive device     
Z30.430     Active  46569049

 

 Problem  Bug bites     W57.XXXA     Active  153401713

 

 Problem  Itching     L29.9     Active  400841879







ALLERGIES

No Information



ENCOUNTERS







 Encounter  Location  Date  Diagnosis

 

 [place] [id number] [place] [address]851-
5116  24 Oct, 2018   

 

 [facility]  [address] [address]-
4542  03 Oct, 2018  Microcytic anemia D50.9

 

 [facility]  [address] [address]-
9190  26 Sep, 2018  Multigravida in first trimester Z34.81 and Normal pregnancy 
in multigravida Z34.80

 

 [place] [id number] [place] [address] 49491-
4395  20 Sep, 2018   

 

 [facility]  [id number] [place] [address] 16808-
1255  14 Sep, 2018   

 

 [place] [id number] [place] [address] 13282-
6974  11 Sep, 2018   

 

 [place] [id number] [place] [address] 07152-
8089  07 Sep, 2018  Encounter for pregnancy test Z32.00

 

 [facility]  [id number] [place] [address] [phone]
[phone]    Irritant contact dermatitis due to other chemical products 
L24.5

 

 [facility]  [address] [address]-
2802    Frequent headaches R51

 

 [facility]  [address] [place] [address] 27018
-[phone]    Frequent headaches R51

 

 [facility]  [id number] [place] [address] 67304
-[phone]    Other viral agents as the cause of diseases classified 
elsewhere B97.89 and Acute upper respiratory infection, unspecified J06.9

 

 [place] [id number] [place] [address] 70335-
5999    Pyelonephritis N12

 

 [place] [id number] [place] [address] 41684-
8030    Leukorrhea N89.8 ; Acute vaginitis N76.0 and Other 
specified bacterial agents as the cause of diseases classified elsewhere B96.89

 

 [place] [id number] [place] [address] 70636-
6825  29 Mar, 2016  Right ovarian cyst N83.20

 

 [place] [id number] [place] [address] 04889-
7971     

 

 [place] [id number] [place] [address] 69279-
8850     

 

 [place] [id number] [place] [address] 58598-
8794    Right ovarian cyst N83.20

 

 [place] [id number] [place] [address] 73988-
9889    Encounter for insertion of intrauterine contraceptive 
device Z30.430

 

 [place] [id number] [place] [address] 11965-
4800    Encounter for counseling regarding contraception Z30.9

 

 [place] [id number] [place] [address] 78370-
4546     

 

 [place] [id number] [place] [address] 26458-
0746  18 Dec, 2015  Well woman exam Z01.419 ; Weight gain R63.5 and BMI 27.0-
27.9,adult Z68.27

 

 [place] [id number] [place] [address] 85031-
2310  18 Dec, 2015  Erythema nodosum L52 and Fatigue R53.83

 

 [place] [address] [address]-
7045  16 Dec, 2015  Erythema nodosum L52

 

 [place] [address] [address]-
3513  30 2015  General counseling and advice for contraceptive management 
Z30.09 and OCP (oral contraceptive pills) initiation Z30.011

 

 [place] [id number] [place] [address] 23492-
9874  27 2015  Bug bites W57.XXXA and Itching L29.9

 

 [place] [id number] [place] [address] 15113-
0986  19 2015  Well woman exam Z01.419 ; Weight gain R63.5 and BMI 27.0-
27.9,adult Z68.27

 

 [place] [id number] [place] [address] 83013-
5280  27 Oct, 2015  Ankle pain, left M25.572

 

 [facility]  [id number] [place] [address] 
[id number]  12 May, 2015  Dental examination V72.2

 

 [facility]  [id number] [place] [address] 
[id number]  08 May, 2015  Dental examination V72.2

 

 [place] [address] [address]-
5984  14 2015   

 

 [place] [address] [address]-
0641  13 2015   

 

 [place] [address] [place] [address]-
8442  ,    

 

 [facility] [place] [facility]  [address] [id number] [place] [address]-
9091  ,    

 

 [facility] [place] [facility]  [address] [id number] [place] [address]-
4174  ,    

 

 [facility] [place] [facility]  [address] [id number] [place] [address]-
9851  ,    

 

 [facility] [place] [facility]  [address] [id number] [place] [address]-
9461     

 

 [facility] [place] [facility]  [address] [id number] [place] [address]-
0729     

 

 [facility] [place] [facility]  [address] [id number] [place] [address]-
7015  09 Oct, 2014   

 

 [facility] [place] [facility]  [address] [id number] [place] [address]-
3606  09 Oct, 2014   

 

 [facility] [place] [facility]  [address] [id number] [place] [address]-
3103  29 Sep, 2014   

 

 [facility] [place] [facility]  [address] [id number] [place] [address]-
6312  29 Sep, 2014   

 

 [facility] [place] [facility]  [address] [id number] [place] [address]-
2343  22 Sep, 2014   

 

 [facility] [place] [facility]  [address] [id number] [place] [address]-
5778  22 Sep, 2014   

 

 [facility] [place] [facility]  [address] [id number] [place] [address]-
6494  22 Aug, 2014   

 

 [facility] [place] [facility]  [address] [id number] [place] [address]-
9405  22 Aug, 2014   

 

 [facility] [place] [facility]  [address] [id number] [place] [address]-
7749  19 May, 2014   

 

 [facility] [place] [facility]  [address] [id number] [place] [address]-
6015  19 May, 2014   

 

 [facility] [place] [facility]  [address] [id number] [place] [address]-
2443  10 Apr, 2014   

 

 [facility] [place] [facility]  [address] [id number] [place] [address]-
6903  10 Apr, 2014   

 

 [facility] [place] [facility]  [address] [id number] [place] [address]-
8681     

 

 [facility] [place] [facility]  [address] [id number] [place] [address]-
1268     

 

 [facility] [place] [facility]  [address] [id number] [place] [address]-
8833     

 

 [facility] [place] [facility]  [address] [id number] [place] [address]-
7237     

 

 [facility] [place] [facility]  [address] [id number] [place] [address]-
5594  10 Feb, 2014   

 

 [facility] [place] [facility]  [address] [id number] [place] [address]-
3614  10 Feb, 2014   

 

 [facility] [place] [facility]  [address] [id number] [place] [address]-
2468     

 

 [facility] [place] [facility]  [address] [id number] [place] [address]-
4799     

 

 [facility] [place] [facility]  [address] [id number] [place] [address]-
1475     

 

 [facility] [place] [facility]  [address] [id number] [place] [address]-
3325     

 

 [facility] [place] [facility]  [address] [id number] [place] [address]-
1796     

 

 [facility] [place] [facility]  [address] [id number] [place] [address]-
1563     

 

 [facility] [place] [facility]  [address] [id number] [place] [address]-
2745     

 

 [facility] [place] [facility]  [address] [id number] [place] [address]-
7177     

 

 [facility]  [address] [id number] [place] [address]-
0251     

 

 [facility] [place] [facility]  [address] [id number] [place] [address]-
5303     

 

 [facility]  [address] [id number] [place] [address]-
8783     

 

 [facility] [place] [facility]  [address] [id number] [place] [address]-
7295     

 

 [facility]  [address] [id number] [place] [address]-
7523  28 Mar, 2013   

 

 [facility] [place] [facility]  [address] [facility] [id number] [place] [address]-
6053  25 Mar, 2013   

 

 [facility]  [address] [facility] [id number] [place] [address]-
0632  07 Mar, 2013   

 

 [facility]  [address] [facility] [id number] [place] [address]-
2886  04 Mar, 2013   

 

 [facility] [place] [facility]  [address] [facility] [id number] [place] [address]-
9547  01 Mar, 2013   

 

 [facility]  [address] [facility] [id number] [place] [address]-
9449     

 

 [facility] [place] [facility]  [address] [id number] [place] [address]-
0819     

 

 [facility] [place] [facility]  [address] [facility] [id number] [place] [address]
0333     

 

 [facility] [place] [facility]  [address] [id number] [place] [address]-
2088  25 Oct, 2012   

 

 [facility] [place] [facility]  [address] [facility] [id number] [place] [address]-
0958  25 Oct, 2012   

 

 [facility] [place] [facility]  [address] [facility] [id number] [place] [address]
4411  03 Oct, 2012   

 

 [facility]  [address] [facility] [id number] [place] [address]-
0446     

 

 [facility]  [address] [facility] [id number] [place] [address]-
3856     







IMMUNIZATIONS

No Known Immunizations



SOCIAL HISTORY

Never Assessed



REASON FOR VISIT

Requesting lab results



PLAN OF CARE





VITAL SIGNS





MEDICATIONS







 Medication  Instructions  Dosage  Frequency  Start Date  End Date  Duration  
Status

 

 Amoxicillin 500 mg  Orally every 12 hrs  1 tablet  12h  04 Oct, 2018  11 Oct, 
2018  7 days  Active

 

 Ferrous Sulfate 325 (65 Fe) MG  Orally Once a day  1 tablet  24h  04 Oct, 2018
     30 day(s)  Active







RESULTS

No Results



PROCEDURES

No Known procedures



INSTRUCTIONS





MEDICATIONS ADMINISTERED

No Known Medications



MEDICAL (GENERAL) HISTORY







 Type  Description  Date

 

 Surgical History  No know Surgical history   

 

 Hospitalization History  childbirth

## 2018-10-31 NOTE — XMS REPORT
[facility]

 Created on: 2016



Leida Ridley

External Reference #: [id number]

: 1990

Sex: Female



Demographics







 Address  [address]  [address]

 

 Home Phone  [phone]

 

 Preferred Language  Unknown

 

 Marital Status  Unknown

 

 Spiritism Affiliation  Unknown

 

 Race  White

 

 Ethnic Group   or 





Author







 Author  RANDI LOUIS

 

 [facility]  eClinicalWorks

 

 Address  Unknown

 

 Phone  Unavailable







Care Team Providers







 Care Team Member Name  Role  Phone

 

 RANDI LOUIS  CP  Unavailable



                                                                



Allergies, Adverse Reactions, Alerts

          





 Substance  Reaction  Event Type

 

 N.K.D.A.  Info Not Available  Non Drug Allergy



                                                                               
         



Problems

          





 Problem Type  Condition  Code  Onset Dates  Condition Status

 

 Problem  Bug bites  W57.XXXA     Active

 

 Problem  Itching  L29.9     Active

 

 Problem  Encounter for insertion of intrauterine contraceptive device  Z30.430
     Active

 

 Assessment  Encounter for insertion of intrauterine contraceptive device  
Z30.430     Active



                                                                               
                                       



Medications

          





 Medication  Code System  Code  Instructions  Start Date  End Date  Status  
Dosage

 

 Ibuprofen  NDC  [address]-86  800 MG Orally Three times a day  Dec 16, 2015  
2016     1 tablet

 

 Vistaril  NDC  [address]-06  25 MG Orally every 8 hrs  2015        1 
capsule as needed

 

 Triamcinolone Acetonide  NDC  [address]-15  0.1 % Externally Twice a day  2015        1 application to affected area



                                                                               
                             



Procedures

          





 Procedure  Coding System  Code  Date

 

 URINE PREGNANCY TEST  CPT-4  06965  2016

 

 LEVONORGESTREL INTRAUTERN CNTRACPT  CPT-4    2016

 

 INSERT INTRAUTERINE DEVICE  CPT-4  24040  2016



                                                                               
                                       



Vital Signs

          





 Date/Time:  2016

 

 Temperature  97.1 F

 

 Weight  159.3 lbs

 

 Height  62 in

 

 BMI  29.13 Index

 

 Blood Pressure Diastolic  82 mmHg

 

 Blood Pressure Systolic  124 mmHg

 

 Cardiac Monitoring Heart Rate  84 bpm



                                                                    



Results

          





 Name  Result  Date  Reference Range  Unit  Abnormality Flag

 

 IUD INSERTION               



                                                                    



Summary Purpose

          eClinicalWorks Submission

## 2018-10-31 NOTE — XMS REPORT
[facility]

 Created on: 2015



Leida Ridley

External Reference #: [id number]

: 1990

Sex: Female



Demographics







 Address  [address]  [address]

 

 Home Phone  [phone]

 

 Preferred Language  Unknown

 

 Marital Status  Unknown

 

 Advent Affiliation  Unknown

 

 Race  White

 

 Ethnic Group   or 





Author







 Author  RANDI LOUIS

 

 [facility]  eClinicalWorks

 

 Address  Unknown

 

 Phone  Unavailable







Care Team Providers







 Care Team Member Name  Role  Phone

 

 RANDI LOUIS    Unavailable



                                                                



Allergies

          No Known Allergies                                                   
                                     



Problems

          





 Problem Type  Condition  Code  Onset Dates  Condition Status

 

 Problem  Itching  L29.9     Active

 

 Assessment  Well woman exam  Z01.419     Active

 

 Problem  Bug bites  W57.XXXA     Active

 

 Assessment  Weight gain  R63.5     Active

 

 Assessment  BMI 27.0-27.9,adult  Z68.27     Active



                                                                               
                                                 



Medications

          No Known Medications                                                 
                                       



Procedures

          





 Procedure  Coding System  Code  Date

 

 COMPLETE CBC W/AUTO DIFF WBC  CPT-4  41782  Dec 18, 2015

 

 COMPREHEN METABOLIC PANEL  CPT-4  08160  Dec 18, 2015

 

 GLYCATED HEMOGLOBIN TEST  CPT-4  81136  Dec 18, 2015



                                                                               
                   



Results

          No Known Results                                                     
               



Summary Purpose

          eClinicalWorks Submission

## 2018-10-31 NOTE — XMS REPORT
[facility]

 Created on: 2016



Leida Ridley

External Reference #: [id number]

: 1990

Sex: Female



Demographics







 Address  [address] [place]  [address]

 

 Home Phone  [phone]

 

 Preferred Language  Unknown

 

 Marital Status  Unknown

 

 Synagogue Affiliation  Unknown

 

 Race  White

 

 Ethnic Group   or 





Author







 Author  RANDI LOUIS

 

 [facility]  eClinicalWorks

 

 Address  Unknown

 

 Phone  Unavailable







Care Team Providers







 Care Team Member Name  Role  Phone

 

 RANDI LOUIS    Unavailable



                                                                



Allergies

          No Known Allergies                                                   
                                     



Problems

          





 Problem Type  Condition  Code  Onset Dates  Condition Status

 

 Problem  Bug bites  W57.XXXA     Active

 

 Problem  Itching  L29.9     Active

 

 Problem  Encounter for insertion of intrauterine contraceptive device  Z30.430
     Active



                                                                               
                             



Medications

          No Known Medications                                                 
                             



Results

          No Known Results                                                     
               



Summary Purpose

          eClinicalWorks Submission

## 2018-10-31 NOTE — XMS REPORT
[facility]

 Created on: 2016



Leida Ridley

External Reference #: [id number]

: 1990

Sex: Female



Demographics







 Address  [address], [place]  [address]

 

 Home Phone  [phone]

 

 Preferred Language  Unknown

 

 Marital Status  Unknown

 

 Cheondoism Affiliation  Unknown

 

 Race  White

 

 Ethnic Group   or 





Author







 Author  RANDI LOUIS

 

 [facility]  eClinicalWorks

 

 Address  Unknown

 

 Phone  Unavailable







Care Team Providers







 Care Team Member Name  Role  Phone

 

 RANDI LOUIS    Unavailable



                                                                



Allergies

          No Known Allergies                                                   
                                     



Problems

          





 Problem Type  Condition  Code  Onset Dates  Condition Status

 

 Problem  Itching  L29.9     Active

 

 Problem  Bug bites  W57.XXXA     Active



                                                                               
                   



Medications

          No Known Medications                                                 
                             



Results

          No Known Results                                                     
               



Summary Purpose

          eClinicalWorks Submission

## 2018-10-31 NOTE — XMS REPORT
[facility]

 Created on: 2016



Leida Ridley

External Reference #: [id number]

: 1990

Sex: Female



Demographics







 Address  [address] [place]  [address]

 

 Home Phone  [phone]

 

 Preferred Language  Unknown

 

 Marital Status  Unknown

 

 Worship Affiliation  Unknown

 

 Race  White

 

 Ethnic Group   or 





Author







 Author  RANDI LOUIS

 

 [facility]  eClinicalWorks

 

 Address  Unknown

 

 Phone  Unavailable







Care Team Providers







 Care Team Member Name  Role  Phone

 

 RANDI LOUIS  CP  Unavailable



                                                                



Allergies, Adverse Reactions, Alerts

          





 Substance  Reaction  Event Type

 

 N.K.D.A.  Info Not Available  Non Drug Allergy



                                                                               
         



Problems

          





 Problem Type  Condition  Code  Onset Dates  Condition Status

 

 Problem  Itching  L29.9     Active

 

 Assessment  Encounter for counseling regarding contraception  Z30.9     Active

 

 Problem  Bug bites  W57.XXXA     Active



                                                                               
                             



Medications

          





 Medication  Code System  Code  Instructions  Start Date  End Date  Status  
Dosage

 

 Vistaril  NDC  [address]-90  25 MG Orally every 8 hrs  2015        1 
capsule as needed

 

 Triamcinolone Acetonide  NDC  [address]-15  0.1 % Externally Twice a day  2015        1 application to affected area

 

 Ibuprofen  NDC  [address]-34  800 MG Orally Three times a day  Dec 16, 2015  
2016     1 tablet



                                                                               
                             



Procedures

          





 Procedure  Coding System  Code  Date

 

 Office Visit, Est Pt., Level 3  CPT-4  77131  2016



                                                                               
                   



Vital Signs

          





 Date/Time:  2016

 

 Temperature  98.4 F

 

 Weight  162.2 lbs

 

 Height  62 in

 

 BMI  29.66 Index

 

 Blood Pressure Diastolic  76 mmHg

 

 Blood Pressure Systolic  132 mmHg

 

 Cardiac Monitoring Heart Rate  76 bpm



                                                                              



Results

          No Known Results                                                     
               



Summary Purpose

          eClinicalWorks Submission

## 2018-10-31 NOTE — XMS REPORT
[facility]

 Created on: 2015



Leida Ridley

External Reference #: [id number]

: 1990

Sex: Female



Demographics







 Address  [address]  [address]

 

 Home Phone  [phone]

 

 Preferred Language  Unknown

 

 Marital Status  Unknown

 

 Yazidi Affiliation  Unknown

 

 Race  White

 

 Ethnic Group   or 





Author







 Author  SERGIO OTT

 

 Organization  eClinicalWorks

 

 Address  Unknown

 

 Phone  Unavailable







Care Team Providers







 Care Team Member Name  Role  Phone

 

 SERGIO OTT  CP  Unavailable



                                                                



Allergies, Adverse Reactions, Alerts

          





 Substance  Reaction  Event Type

 

 N.K.D.A.  Info Not Available  Non Drug Allergy



                                                                               
         



Problems

          





 Problem Type  Condition  Code  Onset Dates  Condition Status

 

 Problem  Itching  L29.9     Active

 

 Assessment  Bug bites  W57.XXXA     Active

 

 Problem  Bug bites  W57.XXXA     Active

 

 Assessment  Itching  L29.9     Active



                                                                               
                                       



Medications

          





 Medication  Code System  Code  Instructions  Start Date  End Date  Status  
Dosage

 

 Vistaril  NDC  [address]-92  25 MG Orally every 8 hrs  2015        1 
capsule as needed

 

 Triamcinolone Acetonide  NDC  [address]-15  0.1 % Externally Twice a day  2015        1 application to affected area



                                                                               
                   



Procedures

          





 Procedure  Coding System  Code  Date

 

 Office Visit, Est Pt., Level 3  CPT-4  73773  2015



                                                                               
                   



Vital Signs

          





 Date/Time:  2015

 

 Temperature  98.6 F

 

 Weight  151 lbs

 

 Height  62 in

 

 BMI  27.62 Index

 

 Blood Pressure Diastolic  72 mmHg

 

 Blood Pressure Systolic  130 mmHg

 

 Cardiac Monitoring Heart Rate  80 bpm



                                                                              



Results

          No Known Results                                                     
               



Summary Purpose

          eClinicalWorks Submission

## 2018-10-31 NOTE — XMS REPORT
[facility]

 Created on: 2018



Leida Stanton

External Reference #: [id number]

: 1990

Sex: Female



Demographics







 Address  [address] [place]  [address]

 

 Preferred Language  Unknown

 

 Marital Status  Unknown

 

 Muslim Affiliation  Unknown

 

 Race  Unknown

 

 Ethnic Group  Unknown





Author







 Author  GRANT MILLARD

 

 Organization  [facility]

 

 Address  [address] [place]  [address]



 

 Phone  [phone]







Care Team Providers







 Care Team Member Name  Role  Phone

 

 GRANT MILLARD  Unavailable  [phone]







PROBLEMS







 Type  Condition  ICD9-CM Code  ZOY69-LN Code  Onset Dates  Condition Status  
SNOMED Code

 

 Problem  Encounter for insertion of intrauterine contraceptive device     
Z30.430     Active  95555960

 

 Problem  Bug bites     W57.XXXA     Active  486287285

 

 Problem  Itching     L29.9     Active  213898854







ALLERGIES

No Known Allergies



ENCOUNTERS







 Encounter  Location  Date  Diagnosis

 

 [place] [id number] [place] [address] 0[address]-
1061    Frequent headaches R51

 

 [facility]  [id number] [place] [address] 957[address]
-6952    Frequent headaches R51

 

 [facility]  [id number] [place] [address]
-4501    Other viral agents as the cause of diseases classified 
elsewhere B97.89 and Acute upper respiratory infection, unspecified J06.9

 

 [place] [address] [place] [address] 40[address]-
1906    Pyelonephritis N12

 

 [place] [id number] [place] [address] 6[address]08-
3508    Leukorrhea N89.8 ; Acute vaginitis N76.0 and Other 
specified bacterial agents as the cause of diseases classified elsewhere B96.89

 

 [place] [id number] [place] [address]-
0704  29 Mar, 2016  Right ovarian cyst N83.20

 

 [place] [id number] [place] [address] 08009-
9310     

 

 [place] [id number] [place] [address] 40693-
3070  27 2016   

 

 [place] [id number] [place] [address] 59343-
2885  13 2016  Right ovarian cyst N83.20

 

 [address] [address]-
4142  11 2016  Encounter for insertion of intrauterine contraceptive 
device Z30.430

 

 [address] [address]-
1328  07 2016  Encounter for counseling regarding contraception Z30.9

 

 [address] [address]-
4970  04 2016   

 

 [address] [address]-
3959  18 Dec, 2015  Well woman exam Z01.419 ; Weight gain R63.5 and BMI 27.0-
27.9,adult Z68.27

 

 [address] [address]-
4261  18 Dec, 2015  Erythema nodosum L52 and Fatigue R53.83

 

 [address] [address]-
3127  16 Dec, 2015  Erythema nodosum L52

 

 [address] [address]-
4810  30 2015  General counseling and advice for contraceptive management 
Z30.09 and OCP (oral contraceptive pills) initiation Z30.011

 

 [place] [id number] [place] [address] 12240-
5723  27 2015  Bug bites W57.XXXA and Itching L29.9

 

 [address] [address]-
8533  19 2015  Well woman exam Z01.419 ; Weight gain R63.5 and BMI 27.0-
27.9,adult Z68.27

 

 [address] [address]-
6266  27 Oct, 2015  Ankle pain, left M25.572

 

 [facility]  [address] [id number] 
[id number]  12 May, 2015  Dental examination V72.2

 

 [facility]  [address] 
[id number]  08 May, 2015  Dental examination V72.2

 

 [facility]  [id number] [place] [address] 73475-
1426     

 

 [facility]  [address] [id number], [place] [address]-
8585     

 

 [facility]  [address] [id number] [place] [address] 44775-
2646     

 

 [facility]  [address], [place] [address]-
5286     

 

 [facility]  [id number] [place] [address] 78299-
6386     

 

 [facility]  [id number], [place] [address]-
2696     

 

 [facility]  [address] [address]-
9006     

 

 [facility]  [address] [address]-
1776     

 

 [facility]  [address] [facility] [id number][place] [address]-
2546  09 Oct, 2014   

 

 [facility]  [address] [facility] [id number][place] [address]-
4316  09 Oct, 2014   

 

 [facility]  [address] [facility] [id number][place] [address]-
2546  29 Sep, 2014   

 

 [facility]  [address] [facility] [id number][place] [address]-
3686  29 Sep, 2014   

 

 [facility]  [address] [facility] [id number][place] [address]-
2872  22 Sep, 2014   

 

 [facility] [place] [facility]  [address] [id number] [place] [address]-
8230  22 Sep, 2014   

 

 [facility] [place] [facility]  [address] [id number] [place] [address]-
6912  22 Aug, 2014   

 

 [facility] [place] [facility]  [address] [id number] [place] [address]-
4271  22 Aug, 2014   

 

 [facility] [place] [facility]  [address] [id number] [place] [address]-
8384  19 May, 2014   

 

 [facility] [place] [facility]  [address] [id number] [place] [address]-
1087  19 May, 2014   

 

 [facility] [place] [facility]  [address] [id number] [place] [address]-
1469  10 Apr, 2014   

 

 [facility] [place] [facility]  [address] [id number] [place] [address]-
0746  10 Apr, 2014   

 

 [facility] [place] [facility]  [address] [id number] [place] [address]-
8955     

 

 [facility] [place] [facility]  [address] [id number] [place] [address]-
8752     

 

 [facility] [place] [facility]  [address] [id number] [place] [address]-
8935     

 

 [facility] [place] [facility]  [address] [id number] [place] [address]-
8045     

 

 [facility] [place] [facility]  [address] [id number] [place] [address]-
9363  10 Feb, 2014   

 

 [facility] [place] [facility]  [address] [id number] [place] [address]-
9019  10 Feb, 2014   

 

 [facility] [place] [facility]  [address] [id number] [place] [address]-
4336     

 

 [facility] [place] [facility]  [address] [id number] [place] [address]-
5562     

 

 [facility] [place] [facility]  [address] [id number] [place] [address]-
4046     

 

 [facility]  [address] [id number] [place] [address]-
3955     

 

 [facility]  [address] [id number] [place] [address]-
7768     

 

 [facility]  [address] [id number] [place] [address]-
9929     

 

 [facility]  [address] [id number] [place] [address]-
8917     

 

 [facility]  [address] [id number] [place] [address]-
8318     

 

 [facility]  [address] [id number] [place] [address]-
3758     

 

 [facility]  [address] [id number] [place] [address]-
1566     

 

 [facility]  [address] [id number] [place] [address]-
5426     

 

 [facility]  [address] [id number] [place] [address]-
4172     

 

 [facility]  [address] [id number] [place] [address]-
1503  28 Mar, 2013   

 

 [facility]  [address] [id number] [place] [address]-
7039  25 Mar, 2013   

 

 [facility] [place] [facility]  [address] [id number] [place] [address]-
0524  07 Mar, 2013   

 

 [facility] [place] [facility]  [address] [id number] [place] [address]-
0427  04 Mar, 2013   

 

 [facility] [place] [facility]  [address] [id number] [place] [address]-
9775  01 Mar, 2013   

 

 [facility] [place] [facility]  [address] [id number][place] [address]
2546     

 

 [facility]  [address] [id number], [place] [address]-
5948     

 

 [facility]  [address]65100[place], [place] [address]
2546     

 

 [facility]  [address]65100[place], [place] [address]-
8316  25 Oct, 2012   

 

 [facility]  [address]65100[place], [place] [address]-
2546  25 Oct, 2012   

 

 [facility]  [address]65100[place], [place] [address]
2546  03 Oct, 2012   

 

 [facility]  [address]65[address], [place] [address]-
2276     

 

 [facility]  [address], [place] [address]-
7120     







IMMUNIZATIONS

No Known Immunizations



SOCIAL HISTORY

Never Assessed



REASON FOR VISIT

headaches for years- has been gettng worse [facility]



PLAN OF CARE





VITAL SIGNS







 Height  62 in  2017

 

 Weight  156.0 lbs  2017

 

 Temperature  98.4 degrees Fahrenheit  2017

 

 Heart Rate  80 bpm  2017

 

 Respiratory Rate  20   2017

 

 BMI  28.53 kg/m2  2017

 

 Blood pressure systolic  148 mmHg  2017

 

 Blood pressure diastolic  92 mmHg  2017







MEDICATIONS







 Medication  Instructions  Dosage  Frequency  Start Date  End Date  Duration  
Status

 

 Propranolol HCl 40 mg  Orally Twice a day  1 tablet  12h       30 
day(s)  Active







RESULTS

No Results



PROCEDURES

No Known procedures



INSTRUCTIONS





MEDICATIONS ADMINISTERED

No Known Medications



MEDICAL (GENERAL) HISTORY







 Type  Description  Date

 

 Hospitalization History  childbirth

## 2018-10-31 NOTE — XMS REPORT
[facility]

 Created on: 2015



Leida Ridley

External Reference #: [id number]

: 1990

Sex: Female



Demographics







 Address  [address] [place]  [address]

 

 Home Phone  [phone]

 

 Preferred Language  Unknown

 

 Marital Status  Unknown

 

 Cheondoism Affiliation  Unknown

 

 Race  White

 

 Ethnic Group   or 





Author







 Author  JAVAN SCHMIDT

 

 [facility]  eClinical[employer]

 

 Address  Unknown

 

 Phone  Unavailable







Care Team Providers







 Care Team Member Name  Role  Phone

 

 JAVAN SCHMIDT  CP  Unavailable



                                                                



Allergies, Adverse Reactions, Alerts

          





 Substance  Reaction  Event Type

 

 N.K.D.A.  Info Not Available  Non Drug Allergy



                                                                               
         



Problems

          





 Problem Type  Condition  Code  Onset Dates  Condition Status

 

 Problem  Itching  L29.9     Active

 

 Assessment  Erythema nodosum  L52     Active

 

 Problem  Bug bites  W57.XXXA     Active



                                                                               
                             



Medications

          





 Medication  Code System  Code  Instructions  Start Date  End Date  Status  
Dosage

 

 Balziva  NDC  [address]-30  0.4-35 MG-MCG Orally Once a day  2015    
    1 tablet

 

 Triamcinolone Acetonide  NDC  [address]-15  0.1 % Externally Twice a day  2015        1 application to affected area

 

 Ibuprofen  NDC  [address]-41  800 MG Orally Three times a day  Dec 16, 2015  
2016     1 tablet

 

 Vistaril  NDC  [address]-07  25 MG Orally every 8 hrs  2015        1 
capsule as needed



                                                                               
                                       



Procedures

          





 Procedure  Coding System  Code  Date

 

 COMPREHEN METABOLIC PANEL  CPT-4  58360  Dec 16, 2015

 

 RBC SED RATE, AUTOMATED  CPT-4  00792  Dec 16, 2015

 

 ASSAY THYROID STIM HORMONE  CPT-4  20671  Dec 16, 2015

 

 Office Visit, Est Pt., Level 4  CPT-4  90042  Dec 16, 2015

 

 COMPLETE CBC W/AUTO DIFF WBC  CPT-4  36688  Dec 16, 2015

 

 VENIPUNCT, ROUTINE*  CPT-4  25305  Dec 16, 2015



                                                                               
                                                                     



Vital Signs

          





 Date/Time:  Dec 16, 2015

 

 Temperature  98.2 F

 

 Weight  157.7 lbs

 

 Height  62 in

 

 BMI  28.84 Index

 

 Blood Pressure Diastolic  80 mmHg

 

 Blood Pressure Systolic  118 mmHg

 

 Cardiac Monitoring Heart Rate  84 bpm



                                                                    



Results

          





 Name  Result  Date  Reference Range  Unit  Abnormality Flag

 

 ESR/SED RATE               

 

 ----Sedimentation Rate-Westergren  27  17442875  0-32   mm/hr   

 

 CMP               

 

 ----Globulin, Total  3.0  2015  1.5-4.5   g/dL   

 

 ----eGFR If Africn Am  116  67686763      >59   mL/min/1.73   

 

 ----eGFR If NonAfricn Am  100  13812970      >59   mL/min/1.73   

 

 ----Albumin, Serum  4.3  80430039  3.5-5.5   g/dL   

 

 ----Sodium, Serum  139  45578039  134-144   mmol/L   

 

 ----Protein, Total, Serum  7.3  77846957  6.0-8.5   g/dL   

 

 ----BUN/Creatinine Ratio  10  78295426  8-20       

 

 ----Calcium, Serum  9.4  36444022  8.7-10.2   mg/dL   

 

 ----AST (SGOT)  19  2015  0-40   IU/L   

 

 ----Glucose, Serum  89  47759090  65-99   mg/dL   

 

 ----Alkaline Phosphatase, S  68  90137996     IU/L   

 

 ----Bilirubin, Total  <0.2  90745936  0.0-1.2   mg/dL   

 

 ----Creatinine, Serum  0.82  27788663  0.57-1.00   mg/dL   

 

 ----A/G Ratio  1.4  05303197  1.1-2.5       

 

 ----BUN  8  2015  6-20   mg/dL   

 

 ----Carbon Dioxide, Total  25  87292346  18-29   mmol/L   

 

 ----ALT (SGPT)  20  18262094  0-32   IU/L   

 

 ----Potassium, Serum  4.0  98432227  3.5-5.2   mmol/L   

 

 ----Chloride, Serum  100  91679529     mmol/L   

 

 ROUTINE VENIPUNCTURE               

 

 TSH               

 

 ----TSH  1.990  [id number]  0.450-4.500   uIU/mL   



                                                                               
                   



Summary Purpose

          eClinicalWorks Submission

## 2018-10-31 NOTE — XMS REPORT
[facility]

 Created on: 2017



Leida Stanton

External Reference #: [id number]

: 1990

Sex: Female



Demographics







 Address  [address] [place]  [address]

 

 Preferred Language  Unknown

 

 Marital Status  Unknown

 

 Jehovah's witness Affiliation  Unknown

 

 Race  Unknown

 

 Ethnic Group  Unknown





Author







 Author  SEEMA CARDOZO

 

 Organization  [facility]

 

 Address  [address] [place]  [address]



 

 Phone  [phone]







Care Team Providers







 Care Team Member Name  Role  Phone

 

 JULIANE  SEEMA  Unavailable  [phone]







PROBLEMS







 Type  Condition  ICD9-CM Code  NBA86-YN Code  Onset Dates  Condition Status  
SNOMED Code

 

 Problem  Encounter for insertion of intrauterine contraceptive device     
Z30.430     Active  91118646

 

 Problem  Bug bites     W57.XXXA     Active  356535575

 

 Problem  Itching     L29.9     Active  697247516







ALLERGIES

No Known Allergies



SOCIAL HISTORY

Never Assessed



PLAN OF CARE







 Activity  Details









  









 Follow Up  prn Reason:







VITAL SIGNS







 Height  65 in  2017

 

 Weight  156.6 lbs  2017

 

 Temperature  97.7 degrees Fahrenheit  2017

 

 Heart Rate  80 bpm  2017

 

 Respiratory Rate  20   2017

 

 BMI  26.06 kg/m2  2017

 

 Blood pressure systolic  128 mmHg  2017

 

 Blood pressure diastolic  76 mmHg  2017







MEDICATIONS

Unknown Medications



RESULTS

No Results



PROCEDURES

No Known procedures



IMMUNIZATIONS

No Known Immunizations



MEDICAL (GENERAL) HISTORY







 Type  Description  Date

 

 Hospitalization History  childbirth

## 2018-10-31 NOTE — ED GU-FEMALE
General


Chief Complaint:  -Female


Stated Complaint:  17W OB, VAGINAL BLEEDING, LOWER R BACK PAIN


Nursing Triage Note:  


Pt states 17 weeks pregnant.  Pt c/o vaginal bleeding that began today.  Pt 


states bleeding began as spotting and then "filled toilet with blood."  Pt c/o 

R 


flank pain.  Pt denies recent intercourse.  Pt feels fetal movement.


Nursing Sepsis Screen:  No Definite Risk


Source:  patient


Exam Limitations:  no limitations





History of Present Illness


Date Seen by Provider:  Oct 31, 2018


Time Seen by Provider:  18:00


Initial Comments


Patient is a 28 year old female who presents to the emergency room with reports 

of vaginal bleeding that started this afternoon. She had one episode of heavy 

bleeding but has been spotting ever since. She is 17 weeks pregnant and sees 

Dr. Parr at [facility]. She denies recent intercourse or anything intravaginally. She 

feels fetal movement on exam and heart tones were normal on exam. Reports low 

back pain for 1 month.


Timing/Duration:  this afternoon


Severity/Quality:  mild


Location:  other (low back )


Activities at Onset:  none


Sexual [place] History:  single partner


Associated Symptoms:  lower back pain





Allergies and Home Medications


Allergies


Coded Allergies:  


     No Known Drug Allergies (Unverified , 10/31/18)





Home Medications


Cephalexin 500 Mg Capsule, 500 MG PO BID


   Prescribed by: RIYA SMITH on 10/31/18 2047


Clindamycin HCl 300 Mg Capsule, 300 MG PO BID, (Reported)





Patient Home Medication List


Home Medication List Reviewed:  Yes





Review of Systems


Review of Systems


Constitutional:  no symptoms reported, see HPI


Genitourinary:  see HPI, discharge


Pregnant:  Yes





All Other Systemes Reviewed


Negative Unless Noted:  Yes





Past Medical-Social-Family Hx


Past Med/Social Hx:  Reviewed Nursing Past Med/Soc Hx


Patient Social History


Alcohol Use:  Denies Use


Recreational Drug Use:  No


Smoking Status:  Never a Smoker


2nd Hand Smoke Exposure:  No


Recent Foreign Travel:  No


Contact w/Someone Who Travel:  No


Recent Infectious Disease Expo:  No





Seasonal Allergies


Seasonal Allergies:  No





Past Medical History


Surgeries:  No


Respiratory:  No


Cardiac:  No


Neurological:  No


Genitourinary:  No


Gastrointestinal:  No


Musculoskeletal:  No


Endocrine:  No


HEENT:  No


Cancer:  No


Psychosocial:  No


Integumentary:  No


Blood Disorders:  No


Adverse Reaction/Blood Tranf:  No





Family Medical History


Reviewed Nursing Family Hx





Physical Exam


Vital Signs





Vital Signs - First Documented








 10/31/18





 17:43


 


Temp 97.7


 


Pulse 94


 


Resp 18


 


B/P (MAP) 120/75 (90)


 


Pulse Ox 100


 


O2 Delivery Room Air





Capillary Refill : Less Than 3 Seconds


Height, Weight, BMI


Height: 5'5.00"


Weight: 160lbs. oz. 72.602668xx;  BMI


Method:Stated


General Appearance:  WD/WN, no apparent distress


Neck:  non-tender, full range of motion, supple, normal inspection


Cardiovascular:  normal peripheral pulses, regular rate, rhythm, no edema, no 

gallop, no JVD, no murmur


Respiratory:  chest non-tender, lungs clear, normal breath sounds, no 

respiratory distress, no accessory muscle use, respiratory distress


Gastrointestinal:  normal bowel sounds, non tender, soft, no organomegaly, no 

pulsatile mass, abnormal bowel sounds


Pelvic:  normal external exam, normal adnexa, no cerv. motion tender, no masses

, vaginal bleeding (small clot on cervical os on exam . It was removed with 

cotton swab and no bleeding was noted. The is no cervical dilation. Exam was 

assisted by Denis Cox.)


Back:  normal inspection, no CVA tenderness, no vertebral tenderness, CVA 

tenderness (R), CVA tenderness (L)


Neurologic/Psychiatric:  alert, normal mood/affect, oriented x 3


Skin:  normal color, warm/dry





Progress/Results/Core Measures


Suspected Sepsis


Recent Fever Within 48 Hours:  No


Infection Criteria Present:  None


New/Unexplained  Altered Menta:  No


Sepsis Screen:  No Definite Risk


SIRS


Temperature:97.7 


Pulse: 94 


Respiratory Rate: 18


 


Blood Pressure 120 /75 


Mean: 90


 








Results/Orders


Lab Results





My Orders





Vital Signs/I&O


Capillary Refill : Less Than 3 Seconds








Blood Pressure Mean:  90








Progress Note :  


   Time:  19:43


Progress Note


I have seen and evaluated the patient. I have discussed the case with Dr. Gabriel 

and she recommends a speculum exam to evaluate source of the bleeding versus 

urinary tract infection. She recommends if the patient is bleeding from her 

cervix to have complete pelvic rest and treated for urinary tract infection.





Departure


Impression





 Primary Impression:  


 Vaginal bleeding before 22 weeks gestation


 Additional Impression:  


 Urinary tract infection


Disposition:  01 HOME, SELF-CARE


Condition:  Stable/Unchanged





Departure-Patient Inst.


Decision time for Depature:  20:20


Referrals:  


PRIMITIVO GABRIEL DANIEL J MD (PCP/Family)


Primary Care Physician


Patient Instructions:  IRREGULAR VAGINAL BLEEDING, Urinary Tract Infection, 

Adult (DC)





Add. Discharge Instructions:  


Take medications as directed. Follow-up with [facility], call first thing 

in the morning for an appointment time and within no that she was seen in the 

emergency room. Tylenol as directed by the bottle for pain. Drink lots of clear 

liquids like water. Return back to the emergency room for any worsening symptoms

, worsening vaginal bleeding, or any other concerns as needed. Bedrest as much 

as possible until you follow up with [facility]. No intercourse or 

anything intravaginally. All discharge instructions reviewed with patient and/

or family. Voiced understanding.


Scripts


Cephalexin (Keflex) 500 Mg Capsule


500 MG PO BID for 10 Days, #20 CAP


   Prov: RIYA SMITH         10/31/18











RIYA SMITH Oct 31, 2018 19:32

## 2018-10-31 NOTE — XMS REPORT
[facility]

 Created on: 2018



Leida Stanton

External Reference #: [id number]

: 1990

Sex: Female



Demographics







 Address  [address] [place]  [address]

 

 Preferred Language  Unknown

 

 Marital Status  Unknown

 

 Jewish Affiliation  Unknown

 

 Race  Unknown

 

 Ethnic Group  Unknown





Author







 Author  PRIMITIVO OSBORNE

 

 [facility]

 

 Address  [address] [place]  [address]



 

 Phone  [phone]







Care Team Providers







 Care Team Member Name  Role  Phone

 

 DYLON  PRIMITIVO  Unavailable  [phone]







PROBLEMS







 Type  Condition  ICD9-CM Code  WVO01-JS Code  Onset Dates  Condition Status  
SNOMED Code

 

 Problem  Encounter for insertion of intrauterine contraceptive device     
Z30.430     Active  12746764

 

 Problem  Bug bites     W57.XXXA     Active  116993648

 

 Problem  Itching     L29.9     Active  347428388







ALLERGIES

No Information



ENCOUNTERS







 Encounter  Location  Date  Diagnosis

 

 [place] [address] [address]-
9240  24 Oct, 2018   

 

 [place] [address] [address]-
6705  26 Sep, 2018  Multigravida in first trimester Z34.81 and Normal pregnancy 
in multigravida Z34.80

 

 [place] [address] [address]-
9223  20 Sep, 2018   

 

 [place] [address] [place] [id number] [place] [address]-
8115  14 Sep, 2018   

 

 [place] [address] [address]-
9168  11 Sep, 2018   

 

 [place] [address] [place] [id number] [place] [address]475-
4521  07 Sep, 2018  Encounter for pregnancy test Z32.00

 

 [facility]  [address] [place] [phone]
[phone]    Irritant contact dermatitis due to other chemical products 
L24.5

 

 [place] [address] [place] [id number] [place] [address]-
6606    Frequent headaches R51

 

 [facility]  [address] [place] [phone]
[phone]    Frequent headaches R51

 

 [facility]  [address] [place] [id number] [place] [phone]
-[phone]    Other viral agents as the cause of diseases classified 
elsewhere B97.89 and Acute upper respiratory infection, unspecified J06.9

 

 [place] [address] [place] [id number] [place] [address]-
9594    Pyelonephritis N12

 

 [place] [address] [place] [id number] [place] [address]-
9101    Leukorrhea N89.8 ; Acute vaginitis N76.0 and Other 
specified bacterial agents as the cause of diseases classified elsewhere B96.89

 

 [place] [address] [place] [id number] [place] [address]67-
3106  29 Mar, 2016  Right ovarian cyst N83.20

 

 [place] [address] [address]-
5072     

 

 [place] [address] [place] [id number] [place] [address] 64635-
2974     

 

 [place] [address] [place] [id number] [place] [address] 02534-
8845    Right ovarian cyst N83.20

 

 [place] [address] [place] [id number] [place] [address]-
3650    Encounter for insertion of intrauterine contraceptive 
device Z30.430

 

 [place] [address] [place] [id number] [place] [address]45-
8384    Encounter for counseling regarding contraception Z30.9

 

 [place] [address] [address]-
6239     

 

 [place] [address] [place] [id number] [place] [address] 91848-
4435  18 Dec, 2015  Well woman exam Z01.419 ; Weight gain R63.5 and BMI 27.0-
27.9,adult Z68.27

 

 [place] [address] [place] [id number] [place] [address] 03673-
6722  18 Dec, 2015  Erythema nodosum L52 and Fatigue R53.83

 

 [place] [address] [address]24[address]-
9648  16 Dec, 2015  Erythema nodosum L52

 

 [place] [address] [address]-
2607  30 2015  General counseling and advice for contraceptive management 
Z30.09 and OCP (oral contraceptive pills) initiation Z30.011

 

 [place] [address] [address]-
1380  27 2015  Bug bites W57.XXXA and Itching L29.9

 

 [place] [address] [address]-
7189    Well woman exam Z01.419 ; Weight gain R63.5 and BMI 27.0-
27.9,adult Z68.27

 

 [place] [address] [address]-
1086  27 Oct, 2015  Ankle pain, left M25.572

 

 [facility]  [address] 
[id number]  12 May, 2015  Dental examination V72.2

 

 [facility]  [address] 
[id number]  08 May, 2015  Dental examination V72.2

 

 [place] [address] [place] [id number] [place] [address] 41570-
8167     

 

 [place] [address] [address]-
3253     

 

 [place] [address] [address]-
9605     

 

 [place] [address] [address]-
0701     

 

 [facility] [place] [facility]  [address] [id number] [place] [address]-
6063     

 

 [facility] [place] [facility]  [address] [id number] [place] [address]-
6569     

 

 [facility] [place] [facility]  [address] [id number] [place] [address]-
6472     

 

 [facility] [place] [facility]  [address] [id number] [place] [address]-
4498     

 

 [facility] [place] [facility]  [address] [id number] [place] [address]-
6698  09 Oct, 2014   

 

 [facility] [place] [facility]  [address] [id number] [place] [address]-
2666  09 Oct, 2014   

 

 [facility] [place] [facility]  [address] [id number] [place] [address]-
5858  29 Sep, 2014   

 

 [facility] [place] [facility]  [address] [id number] [place] [address]-
1163  29 Sep, 2014   

 

 [facility] [place] [facility]  [address] [id number] [place] [address]-
8705  22 Sep, 2014   

 

 [facility] [place] [facility]  [address] [id number] [place] [address]-
8378  22 Sep, 2014   

 

 [facility] [place] [facility]  [address] [id number] [place] [address]-
3208  22 Aug, 2014   

 

 [facility] [place] [facility]  [address] [id number] [place] [address]-
8018  22 Aug, 2014   

 

 [facility] [place] [facility]  [address] [id number] [place] [address]-
8130  19 May, 2014   

 

 [facility] [place] [facility]  [address] [id number] [place] [address]-
9987  19 May, 2014   

 

 [facility] [place] [facility]  [address] [id number] [place] [address]-
6829  10 Apr, 2014   

 

 [facility] [place] [facility]  [address] [id number] [place] [address]-
9190  10 Apr, 2014   

 

 [facility] [place] [facility]  [address] [id number] [place] [address]-
1576     

 

 [facility] [place] [facility]  [address] [id number] [place] [address]-
1846     

 

 [facility] [place] [facility]  [address] [id number] [place] [address]-
0326     

 

 [facility] [place] [facility]  [address] [id number] [place] [address]-
5467     

 

 [facility] [place] [facility]  [address] [id number] [place] [address]-
7421  10 Feb, 2014   

 

 [facility] [place] [facility]  [address] [id number] [place] [address]-
3138  10 Feb, 2014   

 

 [facility] [place] [facility]  [address] [id number] [place] [address]-
0214     

 

 [facility] [place] [facility]  [address] [id number] [place] [address]-
4540     

 

 [facility] [place] [facility]  [address] [id number] [place] [address]-
8787     

 

 [facility] [place] [facility]  [address] [facility] [id number] [place] [address]-
5092     

 

 [facility] [place] [facility]  [address] [id number] [place] [address]-
6207     

 

 [facility] [place] [facility]  [address] [id number] [address]-
8222     

 

 [facility] [place] [facility]  [address] [id number] [place] [address]-
1238     

 

 [facility] [place] [facility]  [address] [id number] [place] [address]-
7350     

 

 [facility] [place] [facility]  [address] [id number] [place] [address]-
3773     

 

 [facility] [place] [facility]  [address] [id number] [place] [address]-
2603     

 

 [facility] [place] [facility]  [address] [id number] [place] [address]-
5589     

 

 [facility] [place] [facility]  [address] [id number] [place] [address]-
7333     

 

 [facility] [place] [facility]  [address] [id number] [place] [address]-
4606  28 Mar, 2013   

 

 [facility] [place] [facility]  [address] [id number] [place] [address]-
2570  25 Mar, 2013   

 

 [facility] [place] [facility]  [address] [id number] [place] [address]-
5027  07 Mar, 2013   

 

 [facility] [place] [facility]  [address] [id number] [place] [address]-
6029  04 Mar, 2013   

 

 [facility] [place] [facility]  [address] [id number] [place] [address]-
1758  01 Mar, 2013   

 

 [facility] [place] [facility]  [address] [id number] [place] [address]-
2420     

 

 [facility] [place] [facility]  [address] [id number] [place] [address]-
5578     

 

 [facility] [place] [facility]  [address] [id number] [place] [address]-
9513     

 

 [facility] [place] [facility]  [address] [id number] [place] [address]-
4302  25 Oct, 2012   

 

 [facility] [place] [facility]  [address] [id number] [place] [address]-
8218  25 Oct, 2012   

 

 [facility] [place] [facility]  [address] [id number] [place] [address]-
2306  03 Oct, 2012   

 

 [facility] [place] [facility]  [address] [id number] [place] [address]-
8142     

 

 [facility] [place] [facility]  [address] [id number][place] [address]-
7556     







IMMUNIZATIONS

No Known Immunizations



SOCIAL HISTORY

Never Assessed



REASON FOR VISIT

Pregnancy test (walk-in)--MAMADOU Elizabeth



PLAN OF CARE





VITAL SIGNS





MEDICATIONS

Unknown Medications



RESULTS







 Name  Result  Date  Reference Range

 

 PREGNANCY TEST, URINE (IN HOUSE)     2018   

 

 RESULTS  positive      

 

 Lot #  [id number]      

 

 Control  +      

 

 Exp date  10/2020      







PROCEDURES







 Procedure  Date Ordered  Result  Body Site

 

 URINE PREGNANCY TEST  2018      







INSTRUCTIONS





MEDICATIONS ADMINISTERED

No Known Medications



MEDICAL (GENERAL) HISTORY







 Type  Description  Date

 

 Surgical History  No know Surgical history   

 

 Hospitalization History  childbirth  2009

## 2018-11-29 ENCOUNTER — HOSPITAL ENCOUNTER (OUTPATIENT)
Dept: HOSPITAL 75 - RAD | Age: 28
End: 2018-11-29
Attending: OBSTETRICS & GYNECOLOGY
Payer: SELF-PAY

## 2018-11-29 DIAGNOSIS — O46.92: Primary | ICD-10-CM

## 2018-11-29 DIAGNOSIS — Z3A.22: ICD-10-CM

## 2018-11-29 PROCEDURE — 76805 OB US >/= 14 WKS SNGL FETUS: CPT

## 2018-11-29 NOTE — DIAGNOSTIC IMAGING REPORT
INDICATION: Anatomic survey.



TECHNIQUE: Multiple real-time grayscale images were obtained over

the gravid uterus.



COMPARISON: 10/15/2018.



FINDINGS: There is a single live fetus in a cephalic

presentation. Fetal heart rate was recorded at 136 beats per

minute. Amniotic fluid index is 15.7 cm. The placenta is

posterior. Cervical length is 3.8 cm. Fetal survey demonstrates

fetal kidneys, bladder and stomach to be unremarkable. Fetal

brain is unremarkable. There is a four-chamber heart. There is a

three-vessel cord with a normal cord insertion. Note is made that

there is a nuchal cord present. Spine is unremarkable.



Biometrical measurements are as follows:

Biparietal 5.4 cm, age 22 weeks 3 days.

Head circumference 20.8 cm, age 23 weeks 0 days.

Abdominal circumference 18.1 cm, age 23 weeks 0 days.

Femur length 4.0 cm, age 22 weeks 6 days.



Sonographic estimate age: 22 weeks 6 days.

Sonographic estimated date of delivery: 03/31/2019.



Estimated Fetal Weight: 542 gm (+/- 43  gm).

LMP percentile: 58%.



Fetal heart rate: 136 beats per minute.



Fetal number: 1 of 1.



IMPRESSION: Single live IUP at approximately 22-23 weeks

gestational age showing normal interval growth when compared to

prior exam. Fetal survey is unremarkable although note is made of

a nuchal cord.    



Dictated by: 



  Dictated on workstation # [id number]

## 2018-12-18 ENCOUNTER — HOSPITAL ENCOUNTER (OUTPATIENT)
Dept: HOSPITAL 75 - SDC | Age: 28
Discharge: HOME | End: 2018-12-18
Attending: OBSTETRICS & GYNECOLOGY
Payer: COMMERCIAL

## 2018-12-18 VITALS — DIASTOLIC BLOOD PRESSURE: 65 MMHG | SYSTOLIC BLOOD PRESSURE: 117 MMHG

## 2018-12-18 VITALS — HEIGHT: 65 IN | WEIGHT: 158 LBS | BODY MASS INDEX: 26.33 KG/M2

## 2018-12-18 DIAGNOSIS — D50.9: Primary | ICD-10-CM

## 2019-02-01 ENCOUNTER — HOSPITAL ENCOUNTER (OUTPATIENT)
Dept: HOSPITAL 75 - RAD | Age: 29
End: 2019-02-01
Attending: OBSTETRICS & GYNECOLOGY
Payer: COMMERCIAL

## 2019-02-01 DIAGNOSIS — Z3A.00: ICD-10-CM

## 2019-02-01 DIAGNOSIS — O99.013: ICD-10-CM

## 2019-02-01 DIAGNOSIS — O44.43: Primary | ICD-10-CM

## 2019-02-01 PROCEDURE — 76816 OB US FOLLOW-UP PER FETUS: CPT

## 2019-02-01 NOTE — DIAGNOSTIC IMAGING REPORT
US FOLLOW UP EA FETUS [id number]



TECHNIQUE: Limited transabdominal sonographic imaging of the

gravid uterus was performed.



INDICATION: Followup nuchal cord seen on prior ultrasound.



COMPARISON: 11/29/2018.



FINDINGS:

Placenta is posterior in location and there is no previa. Fetus

is in cephalic presentation. The heart rate is 130 beats per

minute. ANTONIO is normal at 10.1 cm. The umbilical cord is seen

along the anterior aspect of the neck but does not

circumferentially encase the neck.



IMPRESSION:

1. No nuchal cord on today's examination. The umbilical cord is

anterior to the neck but does not have circumferential

encasement.



Dictated by: 



  Dictated on workstation # [id number]

## 2019-03-17 NOTE — XMS REPORT
[facility]

 Created on: 2018



Leida Stanton

External Reference #: [id number]

: 1990

Sex: Female



Demographics







 Address  [address] [place]  [address]

 

 Preferred Language  Unknown

 

 Marital Status  Unknown

 

 Oriental orthodox Affiliation  Unknown

 

 Race  Unknown

 

 Ethnic Group  Unknown





Author







 Author  JAMES DICKEY

 

 Organization  [facility]

 

 Address  [address] [place]  [address]



 

 Phone  [phone]







Care Team Providers







 Care Team Member Name  Role  Phone

 

 JAMES DICKEY  Unavailable  [phone]







PROBLEMS







 Type  Condition  ICD9-CM Code  QQB79-RY Code  Onset Dates  Condition Status  
SNOMED Code

 

 Problem  Microcytic anemia     D50.9     Active  682190280

 

 Problem  Encounter for insertion of intrauterine contraceptive device     
Z30.430     Active  50740057

 

 Problem  Bug bites     W57.XXXA     Active  819334863

 

 Problem  Itching     L29.9     Active  050726981







ALLERGIES

No Information



ENCOUNTERS







 Encounter  Location  Date  Diagnosis

 

 [facility]  [address] [address]-
3257     

 

 [facility]  [address] [phone]
[phone]  31 Oct, 2018   

 

 [facility]  [address] [address]-
[address]  24 Oct, 2018  Prenatal care in second trimester Z34.92

 

 [facility]  [id number] [place] [phone]-
[phone]  19 Oct, 2018  Microcytic anemia D50.9

 

 [facility]  [address] [address]-
1027  03 Oct, 2018  Microcytic anemia D50.9

 

 [facility]  [id number] [place] [address]-
2475  26 Sep, 2018  Multigravida in first trimester Z34.81 and Normal pregnancy 
in multigravida Z34.80

 

 [facility]  [id number] [place] [address]-
5158  20 Sep, 2018   

 

 [facility]  [address] [address]-
7214  14 Sep, 2018   

 

 [facility]  [address] [place] [address]-
7365  11 Sep, 2018   

 

 [facility]  [id number] [place] [address]-
6198  07 Sep, 2018  Encounter for pregnancy test Z32.00

 

 [facility]  [facility] [id number] [place] [phone]
[phone]    Irritant contact dermatitis due to other chemical products 
L24.5

 

 [place] [id number] [place] [address]-
4476    Frequent headaches R51

 

 [facility]  [facility] [id number] [place] [phone]
[phone]    Frequent headaches R51

 

 [facility] WALK IN [place] [id number] [place] [phone]
[phone]    Other viral agents as the cause of diseases classified 
elsewhere B97.89 and Acute upper respiratory infection, unspecified J06.9

 

 [place] [address] [place] [address]-
6151    Pyelonephritis N12

 

 [place] [id number] [place] [address]-
3725    Leukorrhea N89.8 ; Acute vaginitis N76.0 and Other 
specified bacterial agents as the cause of diseases classified elsewhere B96.89

 

 [place] [address] [place] [address]-
1087  29 Mar, 2016  Right ovarian cyst N83.20

 

 [place] [id number] [place] [address]-
2772     

 

 [place] [id number] [place] [address]-
7372     

 

 [place] [id number] [place] [address]-
7795    Right ovarian cyst N83.20

 

 [place] [address] [address]-
4474  11 2016  Encounter for insertion of intrauterine contraceptive 
device Z30.430

 

 [place] [address] [address]-
6929  07 2016  Encounter for counseling regarding contraception Z30.9

 

 [address] [address]-
4615  04 2016   

 

 [place] [address] [address]-
0505  18 Dec, 2015  Well woman exam Z01.419 ; Weight gain R63.5 and BMI 27.0-
27.9,adult Z68.27

 

 [address] [address]-
2972  18 Dec, 2015  Erythema nodosum L52 and Fatigue R53.83

 

 [address] [address]-
6071  16 Dec, 2015  Erythema nodosum L52

 

 [address] [address]-
2953  30 2015  General counseling and advice for contraceptive management 
Z30.09 and OCP (oral contraceptive pills) initiation Z30.011

 

 [address] [address]-
9186  27 2015  Bug bites W57.XXXA and Itching L29.9

 

 [address] [address]-
6580  19 2015  Well woman exam Z01.419 ; Weight gain R63.5 and BMI 27.0-
27.9,adult Z68.27

 

 [address] [address]-
8743  27 Oct, 2015  Ankle pain, left M25.572

 

 [facility]  [address] 
[address]  12 May, 2015  Dental examination V72.2

 

 [facility] [place] [facility]  [address] [id number] 
[id number]  08 May, 2015  Dental examination V72.2

 

 [facility] [place] [facility]  [address] [id number] [place] [address]-
8778     

 

 [facility] [place] [facility]  [address] [id number], [place] [address]-
9950     

 

 [facility] [place] [facility]  [address] [id number], [place] [address]-
1731     

 

 [facility] [place] [facility]  [address] [id number] [place] [address]-
5736     

 

 [facility] [place] [facility]  [address] [id number], [place] [address]-
6808     

 

 [facility]  [address] [id number], [place] [address]-
8663     

 

 [facility]  [address] [id number], [place] [address]-
4230     

 

 [facility] [place] [facility]  [address] [id number], [place] [address]-
8929     

 

 [facility]  [address] [facility] [id number], [place] [address]-
1443  09 Oct, 2014   

 

 [facility] [place] [facility]  [address] [id number], [place] [address]-
2912  09 Oct, 2014   

 

 [facility] [place] [facility]  [address] [id number], [place] [address]-
1747  29 Sep, 2014   

 

 [facility] [place] [facility]  [address] [id number], [place] [address]-
9209  29 Sep, 2014   

 

 [facility] [place] [facility]  [address] [id number], [place] [address]-
3748  22 Sep, 2014   

 

 [facility] [place] [facility]  [address] [id number], [place] [address]-
6772  22 Sep, 2014   

 

 [facility] [place] [facility]  [address] [id number] [place] [address]-
1424  22 Aug, 2014   

 

 [facility] [place] [facility]  [address] [id number] [place] [address]-
9634  22 Aug, 2014   

 

 [facility] [place] [facility]  [address] [id number] [place] [address]-
2771  19 May, 2014   

 

 [facility] [place] [facility]  [address] [id number] [place] [address]-
5582  19 May, 2014   

 

 [facility] [place] [facility]  [address] [id number] [place] [address]-
3284  10 Apr, 2014   

 

 [facility] [place] [facility]  [address] [id number] [place] [address]-
6453  10 Apr, 2014   

 

 [facility] [place] [facility]  [address] [id number] [place] [address]-
5702     

 

 [facility] [place] [facility]  [address] [id number] [place] [address]-
4970     

 

 [facility] [place] [facility]  [address] [id number] [place] [address]-
7105     

 

 [facility] [place] [facility]  [address] [id number] [place] [address]-
9753     

 

 [facility] [place] [facility]  [address] [id number] [place] [address]-
0562  10 Feb, 2014   

 

 [facility] [place] [facility]  [address] [id number] [place] [address]-
9618  10 Feb, 2014   

 

 [facility] [place] [facility]  [address] [id number] [place] [address]-
6965     

 

 [facility] [place] [facility]  [address] [id number] [place] [address]-
8727     

 

 [facility] [place] [facility]  [address] [id number] [place] [address]-
6350     

 

 [facility] [place] [facility]  [address] [id number] [place] [address]-
3750     

 

 [facility]  [address] [id number] [place] [address]-
5949     

 

 [facility] [place] [facility]  [address] [id number] [place] [address]-
4319     

 

 [facility] [place] [facility]  [address] [id number] [place] [address]-
3926     

 

 [facility] [place] [facility]  [address] [id number] [place] [address]-
4150     

 

 [facility] [place] [facility]  [address] [id number] [place] [address]-
2824     

 

 [facility] [place] [facility]  [address] [id number] [place] [address]-
8528     

 

 [facility] [place] [facility]  [address] [id number] [place] [address]-
3328     

 

 [facility] [place] [facility]  [address] [id number] [place] [address]-
8340     

 

 [facility] [place] [facility]  [address] [id number] [place] [address]-
2087  28 Mar, 2013   

 

 [facility] [place] [facility]  [address] [id number] [place] [address]-
6895  25 Mar, 2013   

 

 [facility] [place] [facility]  [address] [id number] [place] [address]-
3998  07 Mar, 2013   

 

 [facility] [place] [facility]  [address] [id number] [place] [address]-
3465  04 Mar, 2013   

 

 [facility] [place] [facility]  [address] [id number] [place] [address]-
6611  01 Mar, 2013   

 

 [facility] [place] [facility]  [address] [id number] [place] [address]-
9265     

 

 [facility] [place] [facility]  [address] [id number] [place] [address]-
2197     

 

 [facility] [place] [facility]  [address] [facility] [id number], [place] [address]-
8955     

 

 [facility]  [address] [facility] [id number], [place] [address]-
0991  25 Oct, 2012   

 

 [facility]  [address] [id number], [place] [address]-
4887  25 Oct, 2012   

 

 [facility]  [address] [id number], [place] [address]-
4490  03 Oct, 2012   

 

 [facility]  [address] [id number] [address]-
7830     

 

 [facility]  [address] [id number] [address]132-
0248     







IMMUNIZATIONS

No Known Immunizations



SOCIAL HISTORY

Never Assessed



REASON FOR VISIT

OB 4wk f/u -- gabbi palumbo



PLAN OF CARE







 Activity  Details









  









 Follow Up  4 Weeks Reason:

 

 Pending Test  UA OB DIP (IN HOUSE) 



 



VITAL SIGNS







 Height  62 in  2018-10-24

 

 Weight  151.4 lbs  2018-10-24

 

 Temperature  97.8 degrees Fahrenheit  2018-10-24

 

 Heart Rate  78 bpm  2018-10-24

 

 Respiratory Rate  20   2018-10-24

 

 BMI  27.691 kg/m2  2018-10-24

 

 Blood pressure systolic  118 mmHg  2018-10-24

 

 Blood pressure diastolic  76 mmHg  2018-10-24







MEDICATIONS







 Medication  Instructions  Dosage  Frequency  Start Date  End Date  Duration  
Status

 

 Propranolol HCl 40 mg  Orally Twice a day  1 tablet  12h       30 
day(s)  Not-Taking

 

 Prenatal                    Active

 

 Cranberry                    Active

 

 Ferrous Sulfate 325 (65 Fe) MG  Orally Once a day  1 tablet  24h  04 Oct, 2018
     30 day(s)  Active







RESULTS

No Results



PROCEDURES







 Procedure  Date Ordered  Result  Body Site

 

 URINE-NO MICRO  Oct 24, 2018      







INSTRUCTIONS





MEDICATIONS ADMINISTERED

No Known Medications



MEDICAL (GENERAL) HISTORY







 Type  Description  Date

 

 Surgical History  No know Surgical history   

 

 Hospitalization History  childbirth

## 2019-03-17 NOTE — NUR
JENNIFER NEGRETE presented to unit from ED, accompanied by Significant Other, with c/o LABOR. 
JENNIFER NEGRETE weighed, gowned, voided, and to bed.  EFHM and TOCO applied, VS taken.  
JENNIFER NEGRETE oriented to bed controls, call light, TV, heat, and A/C controls.

## 2019-03-17 NOTE — OB LABOR & DELIVERY RECORD
Vag Delivery Note


Vag Delivery Note


Date of Delivery: 3/17/19 





Preoperative Diagnosis: Leida Stanton  is a 28  /Para  2 /1 ,Gestational 

Age 38 2/7 weeks with SROM





Postoperative Diagnosis: Same, cervical nodular lesions


Surgeon: MAGALIE WISEMAN 





Anesthesia: epidural





Delivery Type: vaginal





Findings: 


Viable female infant, apgars 9/9, weight pending


Lacerations: none


Intact placenta with 3 vessel cord. No nuchal cord, body cord or shoulder 

dystocia





Estimated Blood Loss: 200 ml





Complications: None





Condition: Stable





Description of Procedure:





The patient is a 28  /Para  2 /1 ,Gestational Age 38 2/7 weeks with 

SROM. She was admitted and informed consent was obtained. Her labor course was 

remarkable for augmentation with Pitocin. She progressed to complete dilatation 

and began to push. 





She was then set up for delivery. The infant's head was delivered 

atraumatically in the DORY position. The shoulders and remainder of the infant's 

body were then delivered without difficulty. Upon delivery, the head was held 

below the level of the perineum and the mouth and nares were bulb suctioned. 

The cord was doubly clamped and cut and the infant was handed off to the 

pediatric staff. An intact placenta with 3-vessel cord delivered via Dereje 

and there was found to be minimal bleeding.~ Vigorous fundal massage was 

performed and the fundus was found to be firm. IV oxytocin was given. 

Examination of the vagina and perineum revealed no laceration. I did palpate 2 

small nodular lesions on the cervix that came off in my hand.  These were 

fleshy and less than 1 cm.  I sent these for pathology.  Following the delivery

, sponge, instrument and needle counts were correct. Mom and baby were both in 

stable condition in the labor suite.





Vitals - Labs


Vital Signs - I&O





Vital Signs








  Date Time  Temp Pulse Resp B/P (MAP) Pulse Ox O2 Delivery O2 Flow Rate FiO2


 


3/17/19 16:15 97.3 86 16 133/80 (97) 98 Room Air  


 


3/17/19 16:00  87 16 123/60 (81) 97 Room Air  


 


3/17/19 15:45  93 16 122/71 (88) 97 Room Air  


 


3/17/19 15:30  89 16  97 Room Air  


 


3/17/19 15:15 97.7 82 16 125/63 (83) 97 Room Air  


 


3/17/19 15:00  84 16 108/61 (77) 97 Room Air  


 


3/17/19 14:45  75 16 106/55 (72) 96 Room Air  


 


3/17/19 14:30  85 16 106/56 (73) 98 Room Air  


 


3/17/19 14:15  81 16 115/61 (79) 97 Room Air  


 


3/17/19 14:00  87 16  98 Room Air  


 


3/17/19 13:45  77 16 127/68 (87) 97 Room Air  


 


3/17/19 13:30 98.0 92 16 113/68 (83) 97 Room Air  


 


3/17/19 13:18  91 16 130/62 (84) 98 Room Air  


 


3/17/19 13:15  81 16 122/71 (88) 96 Room Air  


 


3/17/19 13:15        


 


3/17/19 13:14  76 16 117/67 (84) 98 Room Air  


 


3/17/19 13:13  76 16 121/70 (87) 97 Room Air  


 


3/17/19 13:12  86 16 128/77 (94) 98 Room Air  


 


3/17/19 13:09  75 16 126/77 (93) 98 Room Air  


 


3/17/19 13:00  70 16 121/73 (89) 97 Room Air  


 


3/17/19 12:45 98.0 81 16 131/68 (89) 98 Room Air  


 


3/17/19 12:30  87 16 117/73 (88)  Room Air  


 


3/17/19 12:15  86 16 127/75 (92)  Room Air  


 


3/17/19 12:00  86 16 118/72 (87)  Room Air  


 


3/17/19 11:45  77 16 119/70 (86)  Room Air  


 


3/17/19 11:30  83 16 124/81 (95)  Room Air  


 


3/17/19 11:00  81 16 115/72 (86)  Room Air  


 


3/17/19 10:30  72 16 116/71 (86)  Room Air  


 


3/17/19 10:00  82 16 117/73 (88)  Room Air  


 


3/17/19 09:30  96 16 117/66 (83)  Room Air  


 


3/17/19 09:00  101 16 116/72 (87)  Room Air  


 


3/17/19 08:34 97.9 93 16 130/76 (94)  Room Air  











Labs


Laboratory Tests


3/17/19 08:50: 


Urine Color YELLOW, Urine Clarity VERY CLOUDYH, Urine pH 5, Urine Specific 

Gravity 1.025H, Urine Protein 2+H, Urine Glucose (UA) NEGATIVE, Urine Ketones 1+

H, Urine Nitrite NEGATIVE, Urine Bilirubin 1+H, Urine Urobilinogen NORMAL, 

Urine Leukocyte Esterase 3+H, Urine RBC (Auto) 5+H, Urine RBC 5-10H, Urine WBC 

TNTCH, Urine Squamous Epithelial Cells 10-25H, Urine Crystals NONE, Urine 

Bacteria FEWH, Urine Casts NONE, Urine Mucus NEGATIVE, Urine Culture Indicated 

YES


3/17/19 09:00: 


White Blood Count 8.9, Red Blood Count 3.80L, Hemoglobin 10.1L, Hematocrit 31L, 

Mean Corpuscular Volume 82, Mean Corpuscular Hemoglobin 27, Mean Corpuscular 

Hemoglobin Concent 32, Red Cell Distribution Width 18.5H, Platelet Count 259, 

Mean Platelet Volume 9.7, Neutrophils (%) (Auto) 75, Lymphocytes (%) (Auto) 15, 

Monocytes (%) (Auto) 7, Eosinophils (%) (Auto) 3, Basophils (%) (Auto) 0, 

Neutrophils # (Auto) 6.7, Lymphocytes # (Auto) 1.3, Monocytes # (Auto) 0.6, 

Eosinophils # (Auto) 0.2, Basophils # (Auto) 0.0











MAGALIE WISEMAN DO Mar 17, 2019 18:40

## 2019-03-17 NOTE — HISTORY & PHYSICAL-OB
OB - Chief Complaint & HPI


Date/Time


Date of Admission:


Date of Admission:   3/17/19


Date seen by a Provider:  2019


Time Seen by a Provider:  08:45





Chief Complaint/History


OB-Reason for Admission/Chief:  Rupture of Membranes (SROM at 0630)


Hx :  2


Hx Para:  1


Expected Date of Delivery:  Mar 29, 2019


Gestational Age in Weeks:  38


Gestational Age in Days:  0


Other reason for admission:


Patient presents with complaint of SROM.  


Pregnancy complicated by severe anemia requiring IV Iron this pregnancy.


Admission Nurse Assessment Rev:  Yes


History of Labs


o+/neg


VDRL NR


HIV -


HBSAg -


Hep C-


Rub I


GBS-


Most recent Hgb 10.1





Allergies and Home Medications


Allergies


Coded Allergies:  


     No Known Drug Allergies (Unverified , 10/31/18)





Home Medications


Acetaminophen 500 Mg Tablet, 1,000 MG PO Q8H


   Prescribed by: MAGALIE WISEMAN on 3/19/19 0240


Ferrous Sulfate 325 Mg Tablet, 325 MG PO DAILY, (Reported)


Ibuprofen 600 Mg Tablet, 600 MG PO Q6H


   Prescribed by: MAGALIE WISEMAN on 3/19/19 0240


Prenatal Vit W-Ca,Fe,FA(<1 mg) 1 Each Tablet, 1 EACH PO DAILY, (Reported)





Patient Home Medication List


Home Medication List Reviewed:  Yes





OB - History


Hx of Present Pregnancy


Prenatal Care:  Yes


Ultrasounds:  Normal mid trimester US


Obstetrical Complications:  None


Medical Complications:  Other (iron deficiency anemia)





Prenatal Information


Pregnancy Induced Hypertension:  No


Maternal Gestational Diabetes:  No


Postpartum Hemorrhage:  No





Obstetrical History


Hx :  2


Hx Para:  1


Hx # Term Pregnancies:  1


Hx #  Pregnancies:  0


Number of Living Children:  1


Hx Multiple Gestation:  No





Delivery History


Hx Dystocia:  No


Adverse Rxn to Tranfusion:  No





Patient Past Medical History





Iron def anemia





Social History/Family History


HIV/AIDS:  No


Recent Infectious Disease Expo:  No


Sexually Transmitted Disease:  No


Alcohol Use:  Denies Use


Recreational Drug Use:  No


Smoking Cessation:  Never smoker


2nd Hand Smoke Exposure:  No





Immunizations


Tetanus Booster (TDap):  Less than 5yrs (19)


Rubella:  immune


RPR/VDRL:  Negative


GBS Status:  Negative


HBsAG:  Negative





OB - Admission Exam


Physical Exam


Vitals:


130/76


97.9


HEENT:  TMs Normal


Heart:  Rhythm Normal


Lungs:  Clear, Crackles


Abdomen:  Gravid


Extremities:  Normal


Reflexes:  Normal


Cervical Dilatation:  3cm


Effacement:  50%


Station:  Ballotable


Membranes:  Ruptured


Amniotic Fluid:  Clear


Fetal Heart Rate:  140's


Accelerations:  Accelerations Present


Decelerations:  No Decelerations


Short Term Variability:  Present


Long Term Variability:  Average (6-25)


Contractions on Admission:  >10 Minutes Apart


Labs





Laboratory Tests








Test


 3/17/19


09:00 Range/Units


 


 


White Blood Count


 8.9 


 4.3-11.0


10^3/uL


 


Red Blood Count


 3.80 L


 4.35-5.85


10^6/uL


 


Hemoglobin 10.1 L 11.5-16.0  G/DL


 


Hematocrit 31 L 35-52  %


 


Mean Corpuscular Volume 82  80-99  FL


 


Mean Corpuscular Hemoglobin 27  25-34  PG


 


Mean Corpuscular Hemoglobin


Concent 32 


 32-36  G/DL





 


Red Cell Distribution Width 18.5 H 10.0-14.5  %


 


Platelet Count


 259 


 130-400


10^3/uL


 


Mean Platelet Volume 9.7  7.4-10.4  FL


 


Neutrophils (%) (Auto) 75  42-75  %


 


Lymphocytes (%) (Auto) 15  12-44  %


 


Monocytes (%) (Auto) 7  0-12  %


 


Eosinophils (%) (Auto) 3  0-10  %


 


Basophils (%) (Auto) 0  0-10  %


 


Neutrophils # (Auto) 6.7  1.8-7.8  X 10^3


 


Lymphocytes # (Auto) 1.3  1.0-4.0  X 10^3


 


Monocytes # (Auto) 0.6  0.0-1.0  X 10^3


 


Eosinophils # (Auto)


 0.2 


 0.0-0.3


10^3/uL


 


Basophils # (Auto)


 0.0 


 0.0-0.1


10^3/uL











OB - Assessment/Plan/Diagnosis


Assessment


Assessment:  rupture of membranes


Admission Dx


Rupture of membranes at 38 weeks


Plan - Admit for labor


Augmentation as needed. Epidural if necessary.  Peds - [facility]


Admission Status:  Inpatient Order (span 2 midnights)


Reason for Inpatient Admission:  


labor











MAGALIE WISEMAN DO Mar 17, 2019 10:24

## 2019-03-17 NOTE — XMS REPORT
[facility]

 Created on: 2018



Leida Stanton

External Reference #: [id number]

: 1990

Sex: Female



Demographics







 Address  [address] [place]  [address]

 

 Preferred Language  Unknown

 

 Marital Status  Unknown

 

 Quaker Affiliation  Unknown

 

 Race  Unknown

 

 Ethnic Group  Unknown





Author







 Author  PRIMITIVO OSBORNE

 

 [facility]

 

 Address  [address] [place]  [address]



 

 Phone  [phone]







Care Team Providers







 Care Team Member Name  Role  Phone

 

 PRIMITVIO OSBORNE  Unavailable  [phone]







PROBLEMS







 Type  Condition  ICD9-CM Code  CMK30-QB Code  Onset Dates  Condition Status  
SNOMED Code

 

 Problem  Microcytic anemia     D50.9     Active  061063641

 

 Problem  Encounter for insertion of intrauterine contraceptive device     
Z30.430     Active  88666737

 

 Problem  Bug bites     W57.XXXA     Active  012949960

 

 Problem  Itching     L29.9     Active  349071410







ALLERGIES

No Information



ENCOUNTERS







 Encounter  Location  Date  Diagnosis

 

 [facility]  [id number] [place] [phone]-
[phone]     

 

 [facility]  [id number] [place] [phone]
[phone]  31 Oct, 2018   

 

 [facility]  [address] [address]-
8540  24 Oct, 2018  Prenatal care in second trimester Z34.92

 

 [facility]  [id number] [place] [address]-
9656  19 Oct, 2018  Microcytic anemia D50.9

 

 [facility]  [address] [address]-
3881  03 Oct, 2018  Microcytic anemia D50.9

 

 [facility]  [id number] [place] [address]-
5047  26 Sep, 2018  Multigravida in first trimester Z34.81 and Normal pregnancy 
in multigravida Z34.80

 

 [facility]  [id number] [place] [address]-
6001  20 Sep, 2018   

 

 [facility]  [address] [address]-
[address]687  14 Sep, 2018   

 

 [facility]  [id number] [address]
9663  11 Sep, 2018   

 

 [facility]  [id number] [place] [address]-
5361  07 Sep, 2018  Encounter for pregnancy test Z32.00

 

 [facility]  [facility] [id number] [place] [address]
[phone]    Irritant contact dermatitis due to other chemical products 
L24.5

 

 [facility]  [address] [place] [address]-
1913    Frequent headaches R51

 

 [facility] [place] [id number] [place] [address] 38894
-[phone]    Frequent headaches R51

 

 [facility] [place] [address] [place] [address] 66348
-[phone]    Other viral agents as the cause of diseases classified 
elsewhere B97.89 and Acute upper respiratory infection, unspecified J06.9

 

 [place] [address] [place] [address]-
1710    Pyelonephritis N12

 

 [place] [id number] [place] [address]-
7182    Leukorrhea N89.8 ; Acute vaginitis N76.0 and Other 
specified bacterial agents as the cause of diseases classified elsewhere B96.89

 

 [place] [address] [place] [address]-
4907  29 Mar, 2016  Right ovarian cyst N83.20

 

 [place] [address]65100[place] [address]-
3829     

 

 [place] [id number] [place] [address]-
1325     

 

 [place] [address] [place] [address] 18660-
8524    Right ovarian cyst N83.20

 

 [id number] [place] [address]79-
5366  11 2016  Encounter for insertion of intrauterine contraceptive 
device Z30.430

 

 [address] [address]-
7452  07 2016  Encounter for counseling regarding contraception Z30.9

 

 [address] [address]-
8698  04 2016   

 

 [address] [address]-
7662  18 Dec, 2015  Well woman exam Z01.419 ; Weight gain R63.5 and BMI 27.0-
27.9,adult Z68.27

 

 [address] [address]-
7421  18 Dec, 2015  Erythema nodosum L52 and Fatigue R53.83

 

 [address] [address]-
7801  16 Dec, 2015  Erythema nodosum L52

 

 [address] [address]-
9582  30 2015  General counseling and advice for contraceptive management 
Z30.09 and OCP (oral contraceptive pills) initiation Z30.011

 

 [address] [address]-
0373  27 2015  Bug bites W57.XXXA and Itching L29.9

 

 [address] [address]-
9900  19 2015  Well woman exam Z01.419 ; Weight gain R63.5 and BMI 27.0-
27.9,adult Z68.27

 

 [address] [address]-
5722  27 Oct, 2015  Ankle pain, left M25.572

 

 [facility]  [address] 
[id number]  12 May, 2015  Dental examination V72.2

 

 [facility]  [address] [id number] [place] 
[id number]  08 May, 2015  Dental examination V72.2

 

 [facility] [place] [facility]  [address] [id number] [place] [address]-
2826     

 

 [facility] [place] [facility]  [address] [id number] [place] [address]-
0158     

 

 [facility] [place] [facility]  [address] [id number] [place] [address]-
3369  ,    

 

 [facility] [place] [facility]  [address] [id number] [place] [address]-
3265  ,    

 

 [facility] [place] [facility]  [address] [id number] [place] [address]-
6251     

 

 [facility] [place] [facility]  [address] [facility] [id number] [place] [address]-
4916     

 

 [facility] [place] [facility]  [address] [id number] [place] [address]-
2218     

 

 [facility] [place] [facility]  [address] [id number] [place] [address]-
9146     

 

 [facility] [place] [facility]  [address] [facility] [id number] [place] [address]-
1165  09 Oct, 2014   

 

 [facility] [place] [facility]  [address] [facility] [id number] [place] [address]-
2415  09 Oct, 2014   

 

 [facility] [place] [facility]  [address] [id number] [place] [address]-
9763  29 Sep, 2014   

 

 [facility] [place] [facility]  [address] [id number] [place] [address]-
1230  29 Sep, 2014   

 

 [facility] [place] [facility]  [address] [id number] [place] [address]-
5334  22 Sep, 2014   

 

 [facility] [place] [facility]  [address] [id number] [place] [address]-
1063  22 Sep, 2014   

 

 [facility] [place] [facility]  [address] [id number] [place] [address]-
6419  22 Aug, 2014   

 

 [facility] [place] [facility]  [address] [id number] [place] [address]-
6585  22 Aug, 2014   

 

 [facility] [place] [facility]  [address] [id number] [place] [address]-
6694  19 May, 2014   

 

 [facility] [place] [facility]  [address] [id number] [place] [address]-
5402  19 May, 2014   

 

 [facility] [place] [facility]  [address] [id number] [place] [address]-
3365  10 Apr, 2014   

 

 [facility] [place] [facility]  [address] [id number] [place] [address]-
5100  10 Apr, 2014   

 

 [facility] [place] [facility]  [address] [id number] [place] [address]-
4523     

 

 [facility] [place] [facility]  [address] [id number] [place] [address]-
4109     

 

 [facility] [place] [facility]  [address] [id number] [place] [address]-
5366     

 

 [facility] [place] [facility]  [address] [id number] [place] [address]-
7303     

 

 [facility] [place] [facility]  [address] [facility] [id number] [place] [address]-
1087  10 Feb, 2014   

 

 [facility] [place] [facility]  [address] [id number] [place] [address]-
7076  10 Feb, 2014   

 

 [facility] [place] [facility]  [address] [id number] [place] [address]-
2899     

 

 [facility] [place] [facility]  [address] [id number] [place] [address]-
8552     

 

 [facility] [place] [facility]  [address] [id number] [place] [address]-
0114     

 

 [facility] [place] [facility]  [address] [id number] [place] [address]-
1987     

 

 [facility] [place] [facility]  [address] [id number] [place] [address]-
6812     

 

 [facility]  [address] [id number] [place] [address]-
5209     

 

 [facility]  [address] [id number] [place] [address]-
0507     

 

 [facility]  [address] [id number] [place] [address]-
0114     

 

 [facility]  [address] [id number] [place] [address]-
6452     

 

 [facility]  [address] [id number] [place] [address]-
9065     

 

 [facility]  [address] [id number] [place] [address]-
7998     

 

 [facility]  [address] [id number] [place] [address]-
7933     

 

 [facility]  [address] [id number] [place] [address]-
1704  28 Mar, 2013   

 

 [facility]  [address] [id number] [place] [address]-
4336  25 Mar, 2013   

 

 [facility]  [address] [id number] [place] [address]-
9436  07 Mar, 2013   

 

 [facility]  [address] [id number] [place] [address]-
1215  04 Mar, 2013   

 

 [facility] [place] [facility]  [address] [id number] [place] [address]-
0375  01 Mar, 2013   

 

 [facility] [place] [facility]  [address] [id number] [place] [address]-
6356     

 

 [facility] [place] [facility]  [address] [id number] [place] [address]-
9886     

 

 [facility] [place] [facility]  [address] [id number] [place] [address]-
2546     

 

 [facility]  [address] [facility] [id number] [place] [address]-
2546  25 Oct, 2012   

 

 [facility]  [address] [facility] [id number] [place] [address]-
2546  25 Oct, 2012   

 

 [facility]  [address] [facility] [id number] [place] [address]-
2546  03 Oct, 2012   

 

 [facility]  [address] [id number] [address]-
2546     

 

 [facility]  [address] [facility] [id number] [place] [address]
2546     







IMMUNIZATIONS

No Known Immunizations



SOCIAL HISTORY

Never Assessed



REASON FOR VISIT

Triage-OB pt bleeding Moshe Douglass states she is 18 weeks, has no pain- had 
lots of bright red bleeding today. States she can feel herself bleeding, like 
she is having discharge. Discussed with moshe Mckeon probably needs stat labs
/ ultrasound advised ER assessment at this time. 



PLAN OF CARE





VITAL SIGNS





MEDICATIONS

Unknown Medications



RESULTS

No Results



PROCEDURES

No Known procedures



INSTRUCTIONS





MEDICATIONS ADMINISTERED

No Known Medications



MEDICAL (GENERAL) HISTORY







 Type  Description  Date

 

 Surgical History  No know Surgical history   

 

 Hospitalization History  childbirth

## 2019-03-17 NOTE — NUR
CAROLYNE Lord CRNA here for epidural placement. Procedure explained, consent reviewed and signed 
by anesthesia.  Questions answered to patient's satisfaction. Time out taken to verify 
correct patient/procedure. Patient up to side of bed, assisted into sitting position.  
Betadine prep done x3 and sterile drape applied.  Local done, see anesthesia record.  Test 
dose given, see anesthesia record for drug and dosage.  Epidural catheter secured in place.  
Epidural placement complete.  Assisted back into bed, monitors adjusted.  Epidural dosed, 
see anesthesia record.  Epidural of Sufenta/Bupivicaine @ 12 cc/hr stated per pump.  Patient 
tolerated procedure well.

## 2019-03-18 NOTE — POSTPARTUM PROGRESS NOTE
Postpartum Note


Postpartum Note


Postpartum Day # 1 s/p 








Subjective:


Patient is without complaints. Ambulating, voiding. Tolerating a regular diet 

without nausea or vomiting. Normal lochia. Pain is well controlled with oral 

pain medications. bresat feeding. 





Objective:





Laboratory Tests








Test


 3/18/19


05:30 Range/Units


 


 


White Blood Count


 12.1 H


 4.3-11.0


10^3/uL


 


Red Blood Count


 3.83 L


 4.35-5.85


10^6/uL


 


Hemoglobin 10.0 L 11.5-16.0  G/DL


 


Hematocrit 32 L 35-52  %


 


Mean Corpuscular Volume 83  80-99  FL


 


Mean Corpuscular Hemoglobin 26  25-34  PG


 


Mean Corpuscular Hemoglobin


Concent 32 


 32-36  G/DL





 


Red Cell Distribution Width 18.4 H 10.0-14.5  %


 


Platelet Count


 237 


 130-400


10^3/uL


 


Mean Platelet Volume 9.5  7.4-10.4  FL


 


Neutrophils (%) (Auto) 86 H 42-75  %


 


Lymphocytes (%) (Auto) 8 L 12-44  %


 


Monocytes (%) (Auto) 5  0-12  %


 


Eosinophils (%) (Auto) 1  0-10  %


 


Basophils (%) (Auto) 0  0-10  %


 


Neutrophils # (Auto) 10.4 H 1.8-7.8  X 10^3


 


Lymphocytes # (Auto) 1.0  1.0-4.0  X 10^3


 


Monocytes # (Auto) 0.6  0.0-1.0  X 10^3


 


Eosinophils # (Auto)


 0.1 


 0.0-0.3


10^3/uL


 


Basophils # (Auto)


 0.0 


 0.0-0.1


10^3/uL











Vital Sign - Last 24 Hours








 3/17/19 3/17/19 3/17/19 3/17/19





 10:30 11:00 11:30 11:45


 


Pulse 72 81 83 77


 


Resp 16 16 16 16


 


B/P (MAP) 116/71 (86) 115/72 (86) 124/81 (95) 119/70 (86)


 


O2 Delivery Room Air Room Air Room Air Room Air





 3/17/19 3/17/19 3/17/19 3/17/19





 12:00 12:15 12:30 12:45


 


Temp    98.0


 


Pulse 86 86 87 81


 


Resp 16 16 16 16


 


B/P (MAP) 118/72 (87) 127/75 (92) 117/73 (88) 131/68 (89)


 


Pulse Ox    98


 


O2 Delivery Room Air Room Air Room Air Room Air


 


    





 3/17/19 3/17/19 3/17/19 3/17/19





 13:00 13:09 13:12 13:13


 


Pulse 70 75 86 76


 


Resp 16 16 16 16


 


B/P (MAP) 121/73 (89) 126/77 (93) 128/77 (94) 121/70 (87)


 


Pulse Ox 97 98 98 97


 


O2 Delivery Room Air Room Air Room Air Room Air





 3/17/19 3/17/19 3/17/19 3/17/19





 13:14 13:15 13:15 13:18


 


Pulse 76  81 91


 


Resp 16  16 16


 


B/P (MAP) 117/67 (84)  122/71 (88) 130/62 (84)


 


Pulse Ox 98  96 98


 


O2 Delivery Room Air  Room Air Room Air





 3/17/19 3/17/19 3/17/19 3/17/19





 13:30 13:45 14:00 14:15


 


Temp 98.0   


 


Pulse 92 77 87 81


 


Resp 16 16 16 16


 


B/P (MAP) 113/68 (83) 127/68 (87)  115/61 (79)


 


Pulse Ox 97 97 98 97


 


O2 Delivery Room Air Room Air Room Air Room Air


 


    





 3/17/19 3/17/19 3/17/19 3/17/19





 14:30 14:45 15:00 15:15


 


Temp    97.7


 


Pulse 85 75 84 82


 


Resp 16 16 16 16


 


B/P (MAP) 106/56 (73) 106/55 (72) 108/61 (77) 125/63 (83)


 


Pulse Ox 98 96 97 97


 


O2 Delivery Room Air Room Air Room Air Room Air


 


    





 3/17/19 3/17/19 3/17/19 3/17/19





 15:30 15:45 16:00 16:15


 


Temp    97.3


 


Pulse 89 93 87 86


 


Resp 16 16 16 16


 


B/P (MAP)  122/71 (88) 123/60 (81) 133/80 (97)


 


Pulse Ox 97 97 97 98


 


O2 Delivery Room Air Room Air Room Air Room Air


 


    





 3/17/19 3/17/19 3/17/19 3/17/19





 16:30 16:45 17:00 17:15


 


Pulse 90 95 75 82


 


Resp 16 16 16 16


 


B/P (MAP)  141/90 (107)  123/79 (94)


 


Pulse Ox 98 98 98 99


 


O2 Delivery Room Air Room Air Room Air Room Air





 3/17/19 3/17/19 3/17/19 3/17/19





 17:30 17:45 18:00 18:15


 


Temp 98.2   98.4


 


Pulse 94 85 90 90


 


Resp 16 16 16 16


 


B/P (MAP) 121/67 (85) 132/63 (86) 139/63 (88) 138/77 (97)


 


Pulse Ox 98 98 99 100


 


O2 Delivery Room Air Room Air Room Air Room Air


 


    





 3/17/19 3/17/19 3/17/19 3/17/19





 18:30 18:40 18:45 19:00


 


Temp 99.0 98.8 98.6 


 


Pulse 93  101 98


 


Resp 16  16 16


 


B/P (MAP) 136/58 (84)  140/64 (89) 144/66 (92)


 


O2 Delivery Room Air  Room Air Room Air


 


    





 3/17/19 3/17/19 3/17/19 3/17/19





 19:15 19:30 20:04 22:31


 


Temp  99.9  98.4


 


Pulse 102 97 108 


 


Resp  18  


 


B/P (MAP) 151/66 (94) 147/72 (97) 126/64 (84) 


 


    





 3/17/19 3/18/19 3/18/19 





 23:50 03:20 08:24 


 


Temp 97.3 97.0 98.8 


 


Pulse 79 76 87 


 


Resp 18 16 16 


 


B/P (MAP) 108/61 (77) 98/59 (72) 107/65 (79) 


 


Pulse Ox 98 98 98 


 


O2 Delivery   Room Air 














Intake and Output   


 


 3/17/19 3/17/19 3/18/19





 15:00 23:00 07:00


 


Intake Total 1000 ml 1875 ml 


 


Balance 1000 ml 1875 ml 








Physical Exam:


General - Alert and oriented, no apparent distress


Abdomen - Soft, appropriately tender to palpation, non-distended, fundus firm 

at umbilicus


Extremities - no edema, negative Winter's bilaterally 








Assessment:


1. post-partum day # 1, status post spontaneous vaginal delivery.


Recovering well, hemodynamically stable











Plan:


Routine postpartum care.


Encourage breast feeding.


Encourage ambulation.


Ferrous sulfate supplementation.


Plan for discharge tomorrow





Vitals - Labs


Vital Signs - I&O





Vital Signs








  Date Time  Temp Pulse Resp B/P (MAP) Pulse Ox O2 Delivery O2 Flow Rate FiO2


 


3/18/19 08:24 98.8 87 16 107/65 (79) 98 Room Air  


 


3/18/19 03:20 97.0 76 16 98/59 (72) 98   


 


3/17/19 23:50 97.3 79 18 108/61 (77) 98   


 


3/17/19 22:31 98.4       


 


3/17/19 20:04  108  126/64 (84)    


 


3/17/19 19:30 99.9 97 18 147/72 (97)    


 


3/17/19 19:15  102  151/66 (94)    


 


3/17/19 19:00  98 16 144/66 (92)  Room Air  


 


3/17/19 18:45 98.6 101 16 140/64 (89)  Room Air  


 


3/17/19 18:40 98.8       


 


3/17/19 18:30 99.0 93 16 136/58 (84)  Room Air  


 


3/17/19 18:15 98.4 90 16 138/77 (97) 100 Room Air  


 


3/17/19 18:00  90 16 139/63 (88) 99 Room Air  


 


3/17/19 17:45  85 16 132/63 (86) 98 Room Air  


 


3/17/19 17:30 98.2 94 16 121/67 (85) 98 Room Air  


 


3/17/19 17:15  82 16 123/79 (94) 99 Room Air  


 


3/17/19 17:00  75 16  98 Room Air  


 


3/17/19 16:45  95 16 141/90 (107) 98 Room Air  


 


3/17/19 16:30  90 16  98 Room Air  


 


3/17/19 16:15 97.3 86 16 133/80 (97) 98 Room Air  


 


3/17/19 16:00  87 16 123/60 (81) 97 Room Air  


 


3/17/19 15:45  93 16 122/71 (88) 97 Room Air  


 


3/17/19 15:30  89 16  97 Room Air  


 


3/17/19 15:15 97.7 82 16 125/63 (83) 97 Room Air  


 


3/17/19 15:00  84 16 108/61 (77) 97 Room Air  


 


3/17/19 14:45  75 16 106/55 (72) 96 Room Air  


 


3/17/19 14:30  85 16 106/56 (73) 98 Room Air  


 


3/17/19 14:15  81 16 115/61 (79) 97 Room Air  


 


3/17/19 14:00  87 16  98 Room Air  


 


3/17/19 13:45  77 16 127/68 (87) 97 Room Air  


 


3/17/19 13:30 98.0 92 16 113/68 (83) 97 Room Air  


 


3/17/19 13:18  91 16 130/62 (84) 98 Room Air  


 


3/17/19 13:15  81 16 122/71 (88) 96 Room Air  


 


3/17/19 13:15        


 


3/17/19 13:14  76 16 117/67 (84) 98 Room Air  


 


3/17/19 13:13  76 16 121/70 (87) 97 Room Air  


 


3/17/19 13:12  86 16 128/77 (94) 98 Room Air  


 


3/17/19 13:09  75 16 126/77 (93) 98 Room Air  


 


3/17/19 13:00  70 16 121/73 (89) 97 Room Air  


 


3/17/19 12:45 98.0 81 16 131/68 (89) 98 Room Air  


 


3/17/19 12:30  87 16 117/73 (88)  Room Air  


 


3/17/19 12:15  86 16 127/75 (92)  Room Air  


 


3/17/19 12:00  86 16 118/72 (87)  Room Air  


 


3/17/19 11:45  77 16 119/70 (86)  Room Air  


 


3/17/19 11:30  83 16 124/81 (95)  Room Air  


 


3/17/19 11:00  81 16 115/72 (86)  Room Air  


 


3/17/19 10:30  72 16 116/71 (86)  Room Air  














I & O 


 


 3/18/19





 07:00


 


Intake Total 2875 ml


 


Balance 2875 ml











Labs


Laboratory Tests


3/18/19 05:30: 


White Blood Count 12.1H, Red Blood Count 3.83L, Hemoglobin 10.0L, Hematocrit 32L

, Mean Corpuscular Volume 83, Mean Corpuscular Hemoglobin 26, Mean Corpuscular 

Hemoglobin Concent 32, Red Cell Distribution Width 18.4H, Platelet Count 237, 

Mean Platelet Volume 9.5, Neutrophils (%) (Auto) 86H, Lymphocytes (%) (Auto) 8L

, Monocytes (%) (Auto) 5, Eosinophils (%) (Auto) 1, Basophils (%) (Auto) 0, 

Neutrophils # (Auto) 10.4H, Lymphocytes # (Auto) 1.0, Monocytes # (Auto) 0.6, 

Eosinophils # (Auto) 0.1, Basophils # (Auto) 0.0











MAGALIE WISEMAN DO Mar 18, 2019 10:02

## 2019-03-19 NOTE — NUR
PT SITTING UP ON THE SIDE OF THE BED. VS OBTAINED. MEDS GIVEN; SEE EMAR FOR FURTHER. INITIAL 
SHIFT ASSESSMENT COMPLETED; SEE INTERVENTION FOR FURTHER. PT DENIES ANY NEEDS, PLAN TO 
DISCHARGE TODAY. CALL LIGHT WITHIN REACH.   S/O AT THE BEDSIDE.

## 2019-03-19 NOTE — DISCHARGE INST-WOMEN'S SERVICE
Discharge Inst-Women's Serv


Depart Medication/Instructions


New, Converted or Re-Newed RX:  Transmitted to Pharmacy


Final Diagnosis


Labor


antepartum anemia





Consults/Follow Up


Additional Follow Up:  Yes (6 weeks)





Activity


Activity:  Activity as Tolerated


Driving Instructions:  You May Drive


NO SMOKING:  NO SMOKING


Nothing Inside Vagina:  No Douching, No [place], No Tampons





Diet


Discharge Diet:  No Restrictions


Symptoms to Report to :  Bleeding Excessive, Pain Increased, Fever Over 101 

Degrees F, Vaginal Bleeding Increase, Cramps in Feet or Legs, Vaginal Discharge 

Foul


For Any Problems or Questions:  Contact Your Physician











MAGALIE WISEMAN DO Mar 19, 2019 02:41

## 2019-03-19 NOTE — NUR
PT DISCHARGED FROM -310 TO PERSONAL [facility] VIA AMBULATORY IN STABLE CONDITION ACC BY S/O, 
INFANT AND ROSIBEL HAUSER RN.

## 2020-07-18 NOTE — DIAGNOSTIC IMAGING REPORT
PROCEDURE: CT abdomen and pelvis with contrast.



TECHNIQUE: Multiple contiguous axial images were obtained through

the abdomen and pelvis after administration of intravenous

contrast. Auto Exposure Controls were utilized during the CT exam

to meet ALARA standards for radiation dose reduction. 



INDICATION:  Bilateral flank pain. Nausea and vomiting. 



COMPARISON: None.



FINDINGS: Hypoattenuating and hypoenhancing mass in the right

hepatic lobe measures 2.9 x 3.4 cm. This contains a hyperdensity

on both contrast and delayed images measuring 0.7 cm. The

gallbladder, pancreas, spleen, adrenals, kidneys, collecting

systems and bladder are negative. Peripherally enhancing cyst in

the right ovary measuring up to 2.1 cm likely represents a corpus

luteum. Scant free fluid dependently in the pelvis. Normal

appendix. No free intraperitoneal air. No lymphadenopathy. No

evidence of bowel obstruction or inflammation. No acute osseous

findings.



IMPRESSION: 

1. Hypoattenuating mass in the right hepatic lobe containing a

central hyperattenuation. This is indeterminate and a parasitic

infection could have a similar appearance. Recommend nonemergent

followup with dedicated MRI with contrast.

2. Peripheral enhancing cyst in the right ovary likely represents

a corpus luteum. Scant free fluid dependently in the pelvis may

be physiologic.

3. CT abdomen and pelvis is otherwise unremarkable.



Dictated by: 



  Dictated on workstation # [id number]

## 2020-07-18 NOTE — XMS REPORT
[facility]

                             Created on: 2020



Romy Leida

External Reference #: [id number]

: 1990

Sex: Female



Demographics





                          Address                   [address]

[place]  [address]

 

                          Preferred Language        Unknown

 

                          Marital Status            Unknown

 

                          Orthodoxy Affiliation     Unknown

 

                          Race                      Unknown

 

                          Ethnic Group              Unknown





Author





                          Author                    Leida Mullins Doctor

 

                          Organization              [facility]

 

                          Address                   Unknown

 

                          Phone                     Unavailable







Care Team Providers





                    Care Team Member Name Role                Phone

 

                    Migration,  Doctor  Unavailable         Unavailable







PROBLEMS





          Type      Condition ICD9-CM Code UNP23-LW Code Onset Dates Condition S

tatus SNOMED 

Code

 

             Problem      Encounter for insertion of intrauterine contraceptive 

device              Z30.430       

                          Active                    [id number]

 

          Problem   Microcytic anemia           D50.9               Active    23

[id number]

 

          Problem   Itching             L29.9               Active    [id number]

 

          Problem   Bug bites           W57.XXXA            Active    [id number]







ALLERGIES

No Information



ENCOUNTERS





                Encounter       Location        Date            Diagnosis

 

                          [facility]  [address] [facility] [id number]

[address] [place] [address] 

[address]                              Viral upper respiratory trac

t infection J06.9

 

                          [facility]  [address] [facility] [id number]

[address] [place] [address] 

[address]                31 Oct, 2018               

 

                          [facility]     [address] [facility] [id number]

[address] [place] [address]

                          24 Oct, 2018              Prenatal care in second trim

[place] Z34.92

 

                          [facility]     [address] [facility] [id number]

[address] [place] [address]

                          19 Oct, 2018              Microcytic anemia D50.9

 

                          [facility]     [address] [place] [id number]

[address] [place] [address]

                          03 Oct, 2018              Microcytic anemia D50.9

 

                          [facility]     [address] [facility] [id number]

[address] [place] [address]

                          26 Sep, 2018              Multigravida in first trimes

ter Z34.81 and Normal pregnancy in 

multigravida Z34.80

 

                          [facility]     [address] [facility] [id number]

[address] [place] [address]

                          20 Sep, 2018               

 

                          [facility]     [address] [facility] [id number]

[address] [place] [address]

                          14 Sep, 2018               

 

                          [facility]     [address] [id number]

[address] [place] [phone]

                          11 Sep, 2018               

 

                          [facility]     [address] [id number]

[address] [place] [address]

                          07 Sep, 2018              Encounter for pregnancy test

 Z32.00

 

                          [facility]  [address] [facility] [id number]

[address] [place] [address] 

[address]                              Irritant contact dermatitis 

due to other chemical 

products L24.5

 

                          [facility]     [address] [id number]

[address] [place] [address]

                                        Frequent headaches R51

 

                          [facility]  [facility] [address] [id number]

[address] [place] [address] 

[address]                              Frequent headaches R51

 

                          [facility] [place] [address] [facility] [id number]

[address] [place] [address] 

[address]                              Other viral agents as the ca

use of diseases classified 

elsewhere B97.89 and Acute upper respiratory infection, unspecified J06.9

 

                          [place] [address] [id number]

[address] [place] [address]

                                        Pyelonephritis N12

 

                          [place] [address] [facility] [id number]

[address] [place] [address]

                                        Leukorrhea N89.8 ; Acute vag

initis N76.0 and Other specified 

bacterial agents as the cause of diseases classified elsewhere B96.89

 

                          [place] [address] [id number]

[address] [place] [address]

                          29 Mar, 2016              Right ovarian cyst N83.20

 

                          [place] [address] [id number]

[address] [place] [address]

                                         

 

                          [place] [address] [facility] [id number]

[address] [place] [address]

                                         

 

                          [place] [address] [facility] [id number]

[address] [place] [address]

                                        Right ovarian cyst N83.20

 

                          [place] [address] [address]

                          11 2016              Encounter for insertion of i

ntrauterine contraceptive device 

Z30.430

 

                          [place] [address] [address]

                          07 2016              Encounter for counseling reg

arding contraception Z30.9

 

                          [place] [address] [address]

                          04 2016               

 

                          [place] [address] [address]

                          18 Dec, 2015              Well woman exam Z01.419 ; We

ight gain R63.5 and BMI 27.0-27.9,adult

Z68.27

 

                          [address] [address]

                          18 Dec, 2015              Erythema nodosum L52 and Fat

igue R53.83

 

                          [address] [address]

                          16 Dec, 2015              Erythema nodosum L52

 

                          [address] [address]

                          30 2015              General counseling and advic

e for contraceptive management Z30.09 

and OCP (oral contraceptive pills) initiation Z30.011

 

                          [address] [address]

                          27 2015              Bug bites W57.XXXA and Itchi

ng L29.9

 

                          [address] [address]

                          19 2015              Well woman exam Z01.419 ; We

ight gain R63.5 and BMI 27.0-27.9,adult

Z68.27

 

                          [place] [address] [address]

                          27 Oct, 2015              Ankle pain, left M25.572

 

                          [facility]   [address] [place] [id number]

[address] [place] [id number]

                          12 May, 2015              Dental examination V72.2

 

                          [facility]   [address] [id number]

[address] [place] [id number]

                          08 May, 2015              Dental examination V72.2

 

                          [facility]     [address] [id number]

[address] [place] [address]

                                         

 

                          [facility]     [address] [id number]

[address] [place] [address], [place] [address]

                                         

 

                          [facility]     [address] [id number]

[address] [place] [address]

                                         

 

                          [facility]     [address] [id number]

[address] [place] [address], [place] [address]

                                         

 

                          [facility]     [address] [id number]

[address] [place] [phone]

                                         

 

                          [facility]     [address] [id number]

[address] [place] [address]

                                         

 

                          [facility]     [address] [id number]

[address] [place] [address]

                                         

 

                          [facility]     [address] [id number]

[address] [place] [address]

                                         

 

                          [facility]     [address] [id number]

[address] [place] [address]

                          09 Oct, 2014               

 

                          [facility]     [address] [id number]

[address] [place] [address]

                          09 Oct, 2014               

 

                          [facility]     [address] [id number]

[address] [place] [phone]

                          29 Sep, 2014               

 

                          [facility]     [address] [id number]

[address] [place] [address]

                          29 Sep, 2014               

 

                          [facility]     [address] [id number]

[address] [place] [address], [place] [address]

                          22 Sep, 2014               

 

                          [facility]     [address] [id number]

[address] [place] [phone]

                          22 Sep, 2014               

 

                          [facility]     [address] [id number]

[address] [place] [address], [place] [address]

                          22 Aug, 2014               

 

                          [facility]     [address] [id number]

[address] [place] [address], [place] [address]

                          22 Aug, 2014               

 

                          [facility]     [address] [id number]

[address] [place] [address], [place] [address]

                          19 May, 2014               

 

                          [facility]     [address] [id number]

[address] [place] [address], [place] [address]

                          19 May, 2014               

 

                          [facility]     [address] [id number]

[address] [place] [address], [place] [address]

                          10 Apr, 2014               

 

                          [facility]     [address] [id number]

[address] [place] [address], [place] [address]

                          10 Apr, 2014               

 

                          [facility]     [address] [id number]

[address] [place] [address], [place] [address]

                                         

 

                          [facility]     [address] [id number]

[address] [place] [address], [place] [address]

                                         

 

                          [facility]     [address] [id number]

[address] [place] [address], [place] [address]

                                         

 

                          [facility]     [address] [id number]

[address] [place] [address], [place] [address]

                                         

 

                          [facility]     [address] [id number]

[address] [place] [address], [place] [address]

                          10 Feb, 2014               

 

                          [facility]     [address] [id number]

[address] [place] [address], [place] [address]

                          10 Feb, 2014               

 

                          [facility]     [address] [id number]

[address] [place] [address], [place] [address]

                                         

 

                          [facility]     [address] [id number]

[address] [place] [address], [place] [address]

                                         

 

                          [facility]     [address] [id number]

[address] [place] [address], [place] [address]

                                         

 

                          [facility]     [address] [id number]

[address] [place] [address], [place] [address]

                                         

 

                          [facility]     [address] [id number]

[address] [place] [address], [place] [address]

                                         

 

                          [facility]     [address] [id number]

[address] [place] [address], [place] [address]

                                         

 

                          [facility]     [address] [id number]

[address] [place] [address], [place] [address]

                                         

 

                          [facility]     [address] [id number]

[address] [place] [address], [place] [address]

                                         

 

                          [facility]     [address] [id number]

[address] [place] [address], [place] [address]

                                         

 

                          [facility]     [address] [id number]

[address] [place] [address], [place] [address]

                                         

 

                          [facility]     [address] [id number]

[address] [place] [address], [place] [address]

                                         

 

                          [facility]     [address] [id number]

[address] [place] [address], [place] [address]

                                         

 

                          [facility]     [address] [id number]

[address] [place] [address], [place] [address]

                          28 Mar, 2013               

 

                          [facility]     [address] [id number]

[address] [place] [address], [place] [address]

                          25 Mar, 2013               

 

                          [facility]     [address] [id number]

[address] [place] [address], [place] [address]

                          07 Mar, 2013               

 

                          [facility]     [address] [id number]

[address] [place] [address], [place] [address]

                          04 Mar, 2013               

 

                          [facility]     [address] [id number]

[address] [place] [address], [place] [address]

                          01 Mar, 2013               

 

                          [facility]     [address] [id number]

[address] [place] [address], [place] [address]

                                         

 

                          [facility]     [address] [id number]

[address] [place] [address], [place] [address]

                                         

 

                          [facility]     [address] [id number]

[address] [place] [address]

                                         

 

                          [facility]     [address] [id number]

[address] [place] [address]

                          25 Oct, 2012               

 

                          [facility]     [address] [facility] [id number]

[address] [place] [address]

                          25 Oct, 2012               

 

                          [facility]     [address] [facility] [id number]

[address] [place] [address]

                          03 Oct, 2012               

 

                          [facility]     [address] [facility] [id number]

[address] [place] [address]

                                         

 

                          [facility]     [address] [facility] [id number]

[address] [place] [address]

                                         







IMMUNIZATIONS

No Known Immunizations



SOCIAL HISTORY

Never Assessed



REASON FOR VISIT





PLAN OF CARE





VITAL SIGNS





                    Height              62 in               2012

 

                    Weight              136 lbs             2012

 

                    Temperature         98.4 degrees Fahrenheit 2012

 

                    Heart Rate          80 bpm              2012

 

                    Respiratory Rate    16                  2012

 

                    Blood pressure systolic 110 mmHg            2012

 

                    Blood pressure diastolic 70 mmHg             2012







MEDICATIONS

No Known Medications



RESULTS

No Results



PROCEDURES

No Known procedures



INSTRUCTIONS





MEDICATIONS ADMINISTERED

No Known Medications



MEDICAL (GENERAL) HISTORY





                    Type                Description         Date

 

                    Surgical History    No know Surgical history  

 

                    Hospitalization History childbirth          

 

                    Hospitalization History childbirth

## 2020-07-18 NOTE — XMS REPORT
[facility]

                             Created on: 2019



Leida Stnaton

External Reference #: [id number]

: 1990

Sex: Female



Demographics





                          Address                   [address] [place]  [address]

 

                          Preferred Language        Unknown

 

                          Marital Status            Unknown

 

                          Hinduism Affiliation     Unknown

 

                          Race                      Unknown

 

                          Ethnic Group              Unknown





Author





                          Author                    Leida Sheth

 

                          Organization              [facility]

 

                          Address                   [address] [place]  [address]



 

                          Phone                     [phone]







Care Team Providers





                    Care Team Member Name Role                Phone

 

                    SEAMUS Sheth   Unavailable         [phone]







PROBLEMS





          Type      Condition ICD9-CM Code GOD10-LI Code Onset Dates Condition S

tatus SNOMED 

Code

 

             Problem      Encounter for insertion of intrauterine contraceptive 

device              Z30.430       

                          Active                    [id number]

 

          Problem   Microcytic anemia           D50.9               Active    23

[id number]

 

          Problem   Itching             L29.9               Active    847909857

 

          Problem   Bug bites           W57.XXXA            Active    309000269







ALLERGIES

No Information



ENCOUNTERS





                Encounter       Location        Date            Diagnosis

 

                          [facility]  [id number]

[address] [place] [address] 

[address]                              Viral upper respiratory trac

t infection J06.9

 

                          [facility]  [address] [id number]

[address] [place] [address] 

[address]                31 Oct, 2018               

 

                          [facility]     [id number]

[address] [place] [address]

                          24 Oct, 2018              Prenatal care in second trim

orville Z34.92

 

                          [facility]     [id number]

[address] [place] [address]

                          19 Oct, 2018              Microcytic anemia D50.9

 

                          [facility]     [address] [id number]

[address] [place] [address]

                          03 Oct, 2018              Microcytic anemia D50.9

 

                          [facility]     [address] [id number]

[address] [place] [address]

                          26 Sep, 2018              Multigravida in first trimes

ter Z34.81 and Normal pregnancy in 

multigravida Z34.80

 

                          [facility]     [id number]

[address] [place] [address]

                          20 Sep, 2018               

 

                          [facility]     [address] [id number]

[address] [place] [address]

                          14 Sep, 2018               

 

                          [facility]     [address] [facility] [id number]

[address] [place] [address]

                          11 Sep, 2018               

 

                          [facility]     [address] [facility] [id number]

[address] [place] [address]

                          07 Sep, 2018              Encounter for pregnancy test

 Z32.00

 

                          [facility]  [address] [id number]

[address] [place] [address] 

[address]                              Irritant contact dermatitis 

due to other chemical 

products L24.5

 

                          [facility]     [address] [id number]

[address] [place] [address]

                                        Frequent headaches R51

 

                          [facility]  [address] [facility] [id number]

[address] [place] [address] 

[address]                              Frequent headaches R51

 

                          [facility]  [address] [facility] [id number]

[address] [place] [address] 

[address]                              Other viral agents as the ca

use of diseases classified 

elsewhere B97.89 and Acute upper respiratory infection, unspecified J06.9

 

                          [id number] [facility] [id number]

[address] [place] [address]

                                        Pyelonephritis N12

 

                          [address] [facility] [id number]

[address] [place] [address]

                                        Leukorrhea N89.8 ; Acute vag

initis N76.0 and Other specified 

bacterial agents as the cause of diseases classified elsewhere B96.89

 

                          [facility]     [address] [id number]

[address] [place] [address]

                          29 Mar, 2016              Right ovarian cyst N83.20

 

                          [facility]     [address] [facility] [id number]

[address] [place] [address]

                                         

 

                          [facility]     [address] [facility] [id number]

[address] [place] [address]

                                         

 

                          [facility]     [address] [address]

                          13 2016              Right ovarian cyst N83.20

 

                          [address] [address]

                          11 2016              Encounter for insertion of i

ntrauterine contraceptive device 

Z30.430

 

                          [address] [address]

                          07 2016              Encounter for counseling reg

arding contraception Z30.9

 

                          [address] [address]

                          04 2016               

 

                          [address] [address]

                          18 Dec, 2015              Well woman exam Z01.419 ; We

ight gain R63.5 and BMI 27.0-27.9,adult

Z68.27

 

                          [address] [address]

                          18 Dec, 2015              Erythema nodosum L52 and Fat

igue R53.83

 

                          [address] [address]

                          16 Dec, 2015              Erythema nodosum L52

 

                          [address] [address]

                          30 2015              General counseling and advic

e for contraceptive management Z30.09 

and OCP (oral contraceptive pills) initiation Z30.011

 

                          [address] [address]

                          27 2015              Bug bites W57.XXXA and Itchi

ng L29.9

 

                          [address] [address]

                          19 2015              Well woman exam Z01.419 ; We

ight gain R63.5 and BMI 27.0-27.9,adult

Z68.27

 

                          [address] [address]

                          27 Oct, 2015              Ankle pain, left M25.572

 

                          [facility]   [address] [id number]

[address] [place] [id number]

                          12 May, 2015              Dental examination V72.2

 

                          [facility]   [address] [id number]

[address] [place] [id number]

                          08 May, 2015              Dental examination V72.2

 

                          [facility]     [address] [id number]

[address] [place] [address]

                          14 2015               

 

                          [facility]     [address] [id number]

[address] [place] [address]

                                         

 

                          [facility]     [address] [id number]

[address] [place] [address]

                                         

 

                          [facility]     [address] [id number]

[address] [place] [address]

                                         

 

                          [facility]     [address] [id number]

[address] [place] [address]

                                         

 

                          [facility]     [address] [id number]

[address] [place] [address]

                                         

 

                          [facility]     [address] [id number]

[address] [place] [address]

                                         

 

                          [facility]     [address] [id number]

[address] [place] [address]

                                         

 

                          [facility]     [address] [id number]

[address] [place] [address]

                          09 Oct, 2014               

 

                          [facility]     [address] [id number]

[address] [place] [address]

                          09 Oct, 2014               

 

                          [facility]     [address] [id number]

[address] [place] [address]

                          29 Sep, 2014               

 

                          [facility]     [address] [id number]

[address] [place] [address]

                          29 Sep, 2014               

 

                          [facility]     [address] [id number]

[address] [place] [address]

                          22 Sep, 2014               

 

                          [facility]     [address] [id number]

[address] [place] [address], [place] [address]

                          22 Sep, 2014               

 

                          [facility]     [address] [id number]

[address] [place] [address], [place] [address]

                          22 Aug, 2014               

 

                          [facility]     [address] [id number]

[address] [place] [address], [place] [address]

                          22 Aug, 2014               

 

                          [facility]     [address] [id number]

[address] [place] [address], [place] [address]

                          19 May, 2014               

 

                          [facility]     [address] [id number]

[address] [place] [address], [place] [address]

                          19 May, 2014               

 

                          [facility]     [address] [id number]

[address] [place] [address], [place] [address]

                          10 Apr, 2014               

 

                          [facility]     [address] [id number]

[address] [place] [address], [place] [address]

                          10 Apr, 2014               

 

                          [facility]     [address] [id number]

[address] [place] [address], [place] [address]

                                         

 

                          [facility]     [address] [id number]

[address] [place] [address], [place] [address]

                                         

 

                          [facility]     [address] [id number]

[address] [place] [address], [place] [address]

                                         

 

                          [facility]     [address] [id number]

[address] [place] [address], [place] [address]

                                         

 

                          [facility]     [address] [id number]

[address] [place] [address], [place] [address]

                          10 Feb, 2014               

 

                          [facility]     [address] [id number]

[address] [place] [address], [place] [address]

                          10 Feb, 2014               

 

                          [facility]     [address] [id number]

[address] [place] [address], [place] [address]

                                         

 

                          [facility]     [address] [id number]

[address] [place] [address], [place] [address]

                                         

 

                          [facility]     [address] [id number]

[address] [place] [address], [place] [address]

                                         

 

                          [facility]     [address] [id number]

[address] [place] [address], [place] [address]

                                         

 

                          [facility]     [address] [id number]

[address] [place] [address], [place] [address]

                                         

 

                          [facility]     [address] [id number]

[address] [place] [address], [place] [address]

                                         

 

                          [facility]     [address] [id number]

[address] [place] [address], [place] [address]

                                         

 

                          [facility]     [address] [id number]

[address] [place] [address], [place] [address]

                                         

 

                          [facility]     [address] [id number]

[address] [place] [address], [place] [address]

                                         

 

                          [facility]     [address] [id number]

[address] [place] [address], [place] [address]

                                         

 

                          [facility]     [address] [id number]

[address] [place] [address], [place] [address]

                                         

 

                          [facility]     [address] [id number]

[address] [place] [address], [place] [address]

                                         

 

                          [facility]     [address] [id number]

[address] [place] [address], [place] [address]

                          28 Mar, 2013               

 

                          [facility]     [address] [id number]

[address] [place] [address], [place] [address]

                          25 Mar, 2013               

 

                          [facility]     [address] [id number]

[address] [place] [address], [place] [address]

                          07 Mar, 2013               

 

                          [facility]     [address] [id number]

[address] [place] [address], [place] [address]

                          04 Mar, 2013               

 

                          [facility]     [address] [id number]

[address] [place] [address], [place] [address]

                          01 Mar, 2013               

 

                          [facility]     [address] [id number]

[address] [place] [address], [place] [address]

                                         

 

                          [facility]     [address] [id number]

[address] [place] [address]

                                         

 

                          [facility]     [address] [id number]

[address] [place] [address]

                                         

 

                          [facility]     [address] [id number]

[address] [place] [address]

                          25 Oct, 2012               

 

                          [facility]     [address] [facility] [id number]

[address] [place] [address]

                          25 Oct, 2012               

 

                          [facility]     [address] [facility] [id number]

[address] [place] [address]

                          03 Oct, 2012               

 

                          [facility]     [address] [facility] [id number]

[address] [place] [address]

                                         

 

                          [facility]     [address] [facility] [id number]

[address] [place] [address]

                                         







IMMUNIZATIONS

No Known Immunizations



SOCIAL HISTORY

Never Assessed



REASON FOR VISIT





PLAN OF CARE





VITAL SIGNS





MEDICATIONS

No Known Medications



RESULTS

No Results



PROCEDURES

No Known procedures



INSTRUCTIONS





MEDICATIONS ADMINISTERED

No Known Medications



MEDICAL (GENERAL) HISTORY





                    Type                Description         Date

 

                    Surgical History    No know Surgical history  

 

                    Hospitalization History childbirth          

 

                    Hospitalization History childbirth          2019

## 2020-07-18 NOTE — XMS REPORT
[facility]

                             Created on: 2019



Romy Leida

External Reference #: [id number]

: 1990

Sex: Female



Demographics





                          Address                   [address] [place]  [address]

 

                          Preferred Language        Unknown

 

                          Marital Status            Unknown

 

                          Pentecostal Affiliation     Unknown

 

                          Race                      Unknown

 

                          Ethnic Group              Unknown





Author





                          Author                    Leiad Mullins Doctor

 

                          Organization              [facility]

 

                          Address                   Unknown

 

                          Phone                     Unavailable







Care Team Providers





                    Care Team Member Name Role                Phone

 

                    Migration,  Doctor  Unavailable         Unavailable







PROBLEMS





          Type      Condition ICD9-CM Code GGZ26-SK Code Onset Dates Condition S

tatus SNOMED 

Code

 

             Problem      Encounter for insertion of intrauterine contraceptive 

device              Z30.430       

                          Active                    [id number]

 

          Problem   Microcytic anemia           D50.9               Active    23

[id number]

 

          Problem   Itching             L29.9               Active    [id number]

 

          Problem   Bug bites           W57.XXXA            Active    [id number]







ALLERGIES

No Information



ENCOUNTERS





                Encounter       Location        Date            Diagnosis

 

                          [facility]  [address] [id number]

[address] [place] [address] 

[id number]                31 Oct, 2018               

 

                          [facility]     [address] [id number]

[address] [place] [id number]

                          24 Oct, 2018              Prenatal care in second trim

[place]ville Z34.92

 

                          [facility]     [address] [id number]

[address] [place] [address]

                          19 Oct, 2018              Microcytic anemia D50.9

 

                          [facility]     [address] [id number]

[address] [place] [address]

                          03 Oct, 2018              Microcytic anemia D50.9

 

                          [facility]     [address] [id number]

[address] [place] [address]

                          26 Sep, 2018              Multigravida in first trimes

ter Z34.81 and Normal pregnancy in 

multigravida Z34.80

 

                          [facility]     [address] [id number]

[address] [place] [address]

                          20 Sep, 2018               

 

                          [facility]     [address] [id number]

[address] [place] [address]

                          14 Sep, 2018               

 

                          [facility]     [address] [id number]

[address] [place] [address]

                          11 Sep, 2018               

 

                          [facility]     [address] [id number]

[address] [place] [address]

                          07 Sep, 2018              Encounter for pregnancy test

 Z32.00

 

                          [facility]  [address] [id number]

[address] [place] [address] 

[address]                              Irritant contact dermatitis 

due to other chemical 

products L24.5

 

                          [place] [address] [id number]

[address] [place] [address]

                                        Frequent headaches R51

 

                          [facility]  [address] [id number]

[address] [place] [address] 

[address]                              Frequent headaches R51

 

                          [facility]  [facility] [address] [id number]

[address] [place] [address] 

[address]                              Other viral agents as the ca

use of diseases classified 

elsewhere B97.89 and Acute upper respiratory infection, unspecified J06.9

 

                          [place] [address] [place] [id number]

[address] [place] [address]

                                        Pyelonephritis N12

 

                          [place] [address] [id number]

[address] [place] [address]

                                        Leukorrhea N89.8 ; Acute vag

initis N76.0 and Other specified 

bacterial agents as the cause of diseases classified elsewhere B96.89

 

                          [place] [address] [id number]

[address] [place] [address]

                          29 Mar, 2016              Right ovarian cyst N83.20

 

                          [place] [address] [place] [id number]

[address] [place] [phone]

                                         

 

                          [place] [address] [id number]

[address] [place] [address]

                                         

 

                          [place] [address] [place] [id number]

[address] [place] [address]

                                        Right ovarian cyst N83.20

 

                          [place] [address] [place] [id number]

[address] [place] [address]

                                        Encounter for insertion of i

ntrauterine contraceptive device 

Z30.430

 

                          [place] [address]

[address] [place] [address]

                          07 2016              Encounter for counseling reg

arding contraception Z30.9

 

                          [place] [address] [address]

                          04 2016               

 

                          [place] [address] [address]

                          18 Dec, 2015              Well woman exam Z01.419 ; We

ight gain R63.5 and BMI 27.0-27.9,adult

Z68.27

 

                          [place] [address] [address]

                          18 Dec, 2015              Erythema nodosum L52 and Fat

igue R53.83

 

                          [place] [address] [address]

                          16 Dec, 2015              Erythema nodosum L52

 

                          [place] [address] [address]

                          30 2015              General counseling and advic

e for contraceptive management Z30.09 

and OCP (oral contraceptive pills) initiation Z30.011

 

                          [address] [address]

                          27 2015              Bug bites W57.XXXA and Itchi

ng L29.9

 

                          [place] [id number] [place] [address]

                          19 2015              Well woman exam Z01.419 ; We

ight gain R63.5 and BMI 27.0-27.9,adult

Z68.27

 

                          [place] [address]

[address] [place] [address]

                          27 Oct, 2015              Ankle pain, left M25.572

 

                          [facility]   [id number]

[address] [place] [id number]

                          12 May, 2015              Dental examination V72.2

 

                          [facility]   [address] [place] [id number]

[address] [place] [id number]

                          08 May, 2015              Dental examination V72.2

 

                          [facility]     [address] [id number]

[address] [place] [address], [place] [address]

                          14 2015               

 

                          [facility]     [address] [id number]

[address] [place] [address], [place] [address]

                                         

 

                          [facility]     [address] [id number]

[address] [place] [address], [place] [address]

                          ,                

 

                          [facility]     [address] [id number]

[address] [place] [address], [place] [address]

                          ,                

 

                          [facility]     [address] [id number]

[address] [place] [address], [place] [address]

                          ,                

 

                          [facility]     [address] [id number]

[address] [place] [address], [place] [address]

                          ,                

 

                          [facility]     [address] [id number]

[address] [place] [address], [place] [address]

                                         

 

                          [facility]     [address] [id number]

[address] [place] [phone]

                                         

 

                          [facility]     [address] [id number]

[address] [place] [address], [place] [address]

                          09 Oct, 2014               

 

                          [facility]     [address] [id number]

[address] [place] [phone]

                          09 Oct, 2014               

 

                          [facility]     [address] [id number]

[address] [place] [phone]

                          29 Sep, 2014               

 

                          [facility]     [address] [id number]

[address] [place] [phone]

                          29 Sep, 2014               

 

                          [facility]     [address] [id number]

[address] [place] [address], [place] [address]

                          22 Sep, 2014               

 

                          [facility]     [address] [id number]

[address] [place] [address], [place] [address]

                          22 Sep, 2014               

 

                          [facility]     [address] [id number]

[address] [place] [address], [place] [address]

                          22 Aug, 2014               

 

                          [facility]     [address] [id number]

[address] [place] [phone]

                          22 Aug, 2014               

 

                          [facility]     [address] [id number]

[address] [place] [address], [place] [address]

                          19 May, 2014               

 

                          [facility]     [address] [id number]

[address] [place] [address], [place] [address]

                          19 May, 2014               

 

                          [facility]     [address] [id number]

[address] [place] [address], [place] [address]

                          10 Apr, 2014               

 

                          [facility]     [address] [id number]

[address] [place] [address], [place] [address]

                          10 Apr, 2014               

 

                          [facility]     [address] [id number]

[address] [place] [address], [place] [address]

                                         

 

                          [facility]     [address] [id number]

[address] [place] [address], [place] [address]

                                         

 

                          [facility]     [address] [id number]

[address] [place] [address], [place] [address]

                                         

 

                          [facility]     [address] [id number]

[address] [place] [address], [place] [address]

                                         

 

                          [facility]     [address] [id number]

[address] [place] [address], [place] [address]

                          10 Feb, 2014               

 

                          [facility]     [address] [id number]

[address] [place] [address], [place] [address]

                          10 Feb, 2014               

 

                          [facility]     [address] [id number]

[address] [place] [address], [place] [address]

                                         

 

                          [facility]     [address] [id number]

[address] [place] [address], [place] [address]

                                         

 

                          [facility]     [address] [id number]

[address] [place] [address], [place] [address]

                                         

 

                          [facility]     [address] [id number]

[address] [place] [address], [place] [address]

                                         

 

                          [facility]     [address] [id number]

[address] [place] [address], [place] [address]

                                         

 

                          [facility]     [address] [id number]

[address] [place] [address], [place] [address]

                                         

 

                          [facility]     [address] [id number]

[address] [place] [address], [place] [address]

                                         

 

                          [facility]     [address] [id number]

[address] [place] [address], [place] [address]

                                         

 

                          [facility]     [address] [id number]

[address] [place] [address], [place] [address]

                                         

 

                          [facility]     [address] [id number]

[address] [place] [address], [place] [address]

                                         

 

                          [facility]     [address] [id number]

[address] [place] [address], [place] [address]

                                         

 

                          [facility]     [address] [id number]

[address] [place] [address], [place] [address]

                                         

 

                          [facility]     [address] [id number]

[address] [place] [address], [place] [address]

                          28 Mar, 2013               

 

                          [facility]     [address] [id number]

[address] [place] [address], [place] [address]

                          25 Mar, 2013               

 

                          [facility]     [address] [id number]

[address] [place] [address], [place] [address]

                          07 Mar, 2013               

 

                          [facility]     [address] [id number]

[address] [place] [address], [place] [address]

                          04 Mar, 2013               

 

                          [facility]     [address] [id number]

[address] [place] [address], [place] [address]

                          01 Mar, 2013               

 

                          [facility]     [address] [id number]

[address] [place] [address], [place] [address]

                                         

 

                          [facility]     [address] [id number]

[address] [place] [address], [place] [address]

                                         

 

                          [facility]     [address] [id number]

[address] [place] [address], [place] [address]

                                         

 

                          [facility]     [address] [id number]

[address] [place] [phone]

                          25 Oct, 2012               

 

                          [facility]     [address] [id number]

[address] [place] [phone]

                          25 Oct, 2012               

 

                          [facility]     [address] [id number]

[address] [place] [phone]

                          03 Oct, 2012               

 

                          [facility]     [address] [id number]

[address] [place] [phone]

                                         

 

                          [facility]     [address] [id number]

[address] [place] [phone]

                                         







IMMUNIZATIONS

No Known Immunizations



SOCIAL HISTORY

Never Assessed



REAS[place] FOR VISIT

[facility] [place]





VITAL SIGNS





MEDICATIONS





        Medication Instructions Dosage  Frequency Start Date End Date Duration S

tatus

 

                    Flagyl 500 mg                           1 tablet by Oral rou

te 2 times per day for 7 days Take with 

food, avoid alcohol                                         Active

 

             PredniSONE 20 mg              2 tablet by Oral route 1 time per day

 for 5 day(s)              10 Apr, 

2014                                                        Active

 

             Bactrim -160 mg              1 tablet by Oral route 2 times p

er day for 7 day(s)              01

Mar, 2013                                                   Active







RESULTS

No Results



PROCEDURES

No Known procedures



INSTRUCTIONS





MEDICATIONS ADMINISTERED

No Known Medications



MEDICAL (GENERAL) HISTORY





                    Type                Description         Date

 

                    Surgical History    No know Surgical history  

 

                    Hospitalization History childbirth

## 2020-07-18 NOTE — XMS REPORT
[facility]

                             Created on: 2020



RomyLeida

External Reference #: [id number]

: 1990

Sex: Female



Demographics





                          Address                   [address] [place]  [address]

 

                          Preferred Language        Unknown

 

                          Marital Status            Unknown

 

                          Jehovah's witness Affiliation     Unknown

 

                          Race                      Unknown

 

                          Ethnic Group              Unknown





Author





                          Author                    Leida SCHMIDT

 

                          Organization              [facility]

 

                          Address                   [address] [place]  [address]



 

                          Phone                     [phone]







Care Team Providers





                    Care Team Member Name Role                Phone

 

                    JAVAN SCHMIDT       Unavailable         [phone]







PROBLEMS





          Type      Condition ICD9-CM Code ERH28-IM Code Onset Dates Condition S

tatus SNOMED 

Code

 

             Problem      Encounter for insertion of intrauterine contraceptive 

device              Z30.430       

                          Active                    [id number]

 

          Problem   Microcytic anemia           D50.9               Active    23

[id number]

 

          Problem   Itching             L29.9               Active    [id number]

 

          Problem   Bug bites           W57.XXXA            Active    300006054







ALLERGIES

No Information



ENCOUNTERS





                Encounter       Location        Date            Diagnosis

 

                          [facility]  [address] [id number]

[address] [place] [address] 

[address]                              Viral upper respiratory trac

t infection J06.9

 

                          [facility] [place] [facility]  [address] [id number]

[address] [place] [address] 

[address]                31 Oct, 2018               

 

                          [facility]     [address] [id number]

[address] [place] [address]

                          24 Oct, 2018              Prenatal care in second trim

orville Z34.92

 

                          [facility]     [address]

[address] [place] [address]

                          19 Oct, 2018              Microcytic anemia D50.9

 

                          [facility]     [address] [id number]

[address] [place] [address]

                          03 Oct, 2018              Microcytic anemia D50.9

 

                          [facility]     [address] [id number]

[address] [place] [address]

                          26 Sep, 2018              Multigravida in first trimes

ter Z34.81 and Normal pregnancy in 

multigravida Z34.80

 

                          [facility]     [address]

[address] [place] [address]

                          20 Sep, 2018               

 

                          [facility]     [address] [id number]

[address] [place] [address]

                          14 Sep, 2018               

 

                          [facility]     [address] [id number]

[address] [place] [address]

                          11 Sep, 2018               

 

                          [facility]     [address] [id number]

[address] [place] [address]

                          07 Sep, 2018              Encounter for pregnancy test

 Z32.00

 

                          [facility]  [address] [id number]

[address] [place] [address] 

[address]                              Irritant contact dermatitis 

due to other chemical 

products L24.5

 

                          [facility]     [address] [id number]

[address] [place] [address]

                                        Frequent headaches R51

 

                          [facility]  [address] [facility] [id number]

[address] [place] [address] 

[address]                              Frequent headaches R51

 

                          [facility]  [address] [facility] [id number]

[address] [place] [address] 

[address]                              Other viral agents as the ca

use of diseases classified 

elsewhere B97.89 and Acute upper respiratory infection, unspecified J06.9

 

                          [facility]     [address] [id number]

[address] [place] [address]

                                        Pyelonephritis N12

 

                          [address] [address] [address] [facility] [id number]

[address] [place] [address]

                                        Leukorrhea N89.8 ; Acute vag

initis N76.0 and Other specified 

bacterial agents as the cause of diseases classified elsewhere B96.89

 

                          [facility]     [address] [id number]

[address] [place] [address]

                          29 Mar, 2016              Right ovarian cyst N83.20

 

                          [place] [address] [id number]

[address] [place] [address]

                                         

 

                          [facility]     [address] [facility] [id number]

[address] [place] [address]

                                         

 

                          [address] [address] [address]

                          13 2016              Right ovarian cyst N83.20

 

                          [address] [address] [address]

                          11 2016              Encounter for insertion of i

ntrauterine contraceptive device 

Z30.430

 

                          [address] [place] [address] [address]

                          07 2016              Encounter for counseling reg

arding contraception Z30.9

 

                          [address] [address] [address]

                          04 2016               

 

                          [address] [place] [address] [address]

                          18 Dec, 2015              Well woman exam Z01.419 ; We

ight gain R63.5 and BMI 27.0-27.9,adult

Z68.27

 

                          [address] [address]

                          18 Dec, 2015              Erythema nodosum L52 and Fat

igue R53.83

 

                          [address] [place] [address] [address]

                          16 Dec, 2015              Erythema nodosum L52

 

                          [address] [address] [address]

                          30 2015              General counseling and advic

e for contraceptive management Z30.09 

and OCP (oral contraceptive pills) initiation Z30.011

 

                          [address] [place] [address] [address]

                          27 2015              Bug bites W57.XXXA and Itchi

ng L29.9

 

                          [address] [place] [address] [address]

                          19 2015              Well woman exam Z01.419 ; We

ight gain R63.5 and BMI 27.0-27.9,adult

Z68.27

 

                          [address] [place] [address] [address]

                          27 Oct, 2015              Ankle pain, left M25.572

 

                          [facility]   [address] [id number]

[address] [id number]

                          12 May, 2015              Dental examination V72.2

 

                          [facility]   [address] [id number]

[address] [place] [id number]

                          08 May, 2015              Dental examination V72.2

 

                          [facility]     [address] [id number]

[address] [place] [address]

                                         

 

                          [facility]     [address] [id number]

[address] [place] [address]

                                         

 

                          [facility]     [address] [id number]

[address] [place] [address]

                                         

 

                          [facility]     [address] [id number]

[address] [place] [address]

                                         

 

                          [facility]     [address] [id number]

[address] [place] [address]

                                         

 

                          [facility]     [address] [id number]

[address] [place] [address]

                                         

 

                          [facility]     [address] [id number]

[address] [place] [address]

                                         

 

                          [facility]     [address] [id number]

[address] [place] [address]

                                         

 

                          [facility]     [address] [id number]

[address] [place] [address]

                          09 Oct, 2014               

 

                          [facility]     [address] [id number]

[address] [place] [address]

                          09 Oct, 2014               

 

                          [facility]     [address] [id number]

[address] [place] [address]

                          29 Sep, 2014               

 

                          [facility]     [address] [id number]

[address] [place] [address]

                          29 Sep, 2014               

 

                          [facility]     [address] [id number]

[address] [place] [address]

                          22 Sep, 2014               

 

                          [facility]     [address] [id number]

[address] [place] [address], [place] [address]

                          22 Sep, 2014               

 

                          [facility]     [address] [id number]

[address] [place] [address], [place] [address]

                          22 Aug, 2014               

 

                          [facility]     [address] [id number]

[address] [place] [address], [place] [address]

                          22 Aug, 2014               

 

                          [facility]     [address] [id number]

[address] [place] [address], [place] [address]

                          19 May, 2014               

 

                          [facility]     [address] [id number]

[address] [place] [address], [place] [address]

                          19 May, 2014               

 

                          [facility]     [address] [id number]

[address] [place] [address], [place] [address]

                          10 Apr, 2014               

 

                          [facility]     [address] [id number]

[address] [place] [address], [place] [address]

                          10 Apr, 2014               

 

                          [facility]     [address] [id number]

[address] [place] [address], [place] [address]

                                         

 

                          [facility]     [address] [id number]

[address] [place] [address], [place] [address]

                                         

 

                          [facility]     [address] [id number]

[address] [place] [address], [place] [address]

                                         

 

                          [facility]     [address] [id number]

[address] [place] [address], [place] [address]

                                         

 

                          [facility]     [address] [id number]

[address] [place] [address], [place] [address]

                          10 Feb, 2014               

 

                          [facility]     [address] [id number]

[address] [place] [address], [place] [address]

                          10 Feb, 2014               

 

                          [facility]     [address] [id number]

[address] [place] [address], [place] [address]

                                         

 

                          [facility]     [address] [id number]

[address] [place] [address], [place] [address]

                                         

 

                          [facility]     [address] [id number]

[address] [place] [address], [place] [address]

                                         

 

                          [facility]     [address] [id number]

[address] [place] [address], [place] [address]

                                         

 

                          [facility]     [address] [id number]

[address] [place] [address], [place] [address]

                                         

 

                          [facility]     [address] [id number]

[address] [place] [address], [place] [address]

                                         

 

                          [facility]     [address] [id number]

[address] [place] [address], [place] [address]

                                         

 

                          [facility]     [address] [id number]

[address] [place] [address], [place] [address]

                                         

 

                          [facility]     [address] [id number]

[address] [place] [address], [place] [address]

                                         

 

                          [facility]     [address] [id number]

[address] [place] [address], [place] [address]

                                         

 

                          [facility]     [address] [id number]

[address] [place] [address], [place] [address]

                                         

 

                          [facility]     [address] [id number]

[address] [place] [address], [place] [address]

                                         

 

                          [facility]     [address] [id number]

[address] [place] [address], [place] [address]

                          28 Mar, 2013               

 

                          [facility]     [address] [id number]

[address] [place] [address], [place] [address]

                          25 Mar, 2013               

 

                          [facility]     [address] [id number]

[address] [place] [address], [place] [address]

                          07 Mar, 2013               

 

                          [facility]     [address] [id number]

[address] [place] [address], [place] [address]

                          04 Mar, 2013               

 

                          [facility]     [address] [id number]

[address] [place] [address], [place] [address]

                          01 Mar, 2013               

 

                          [facility]     [address] [id number]

[address] [place] [address], [place] [address]

                                         

 

                          [facility]     [address] [id number]

[address] [place] [address]

                                         

 

                          [facility]     [address] [facility] [id number]

[address] [place] [address]

                                         

 

                          [facility]     [address] [facility] [id number]

[address] [place] [address]

                          25 Oct, 2012               

 

                          [facility]     [address] [facility] [id number]

[address] [place] [address]

                          25 Oct, 2012               

 

                          [facility]     [address] [facility] [id number]

[address] [place] [address]

                          03 Oct, 2012               

 

                          [facility]     [address] [facility] [id number]

[address] [place] [address]

                                         

 

                          [facility]     [address] [facility] [id number]

[address] [place] [address]

                                         







IMMUNIZATIONS

No Known Immunizations



SOCIAL HISTORY

Never Assessed



REASON FOR VISIT





PLAN OF CARE





VITAL SIGNS





MEDICATIONS

No Known Medications



RESULTS

No Results



PROCEDURES

No Known procedures



INSTRUCTIONS





MEDICATIONS ADMINISTERED

No Known Medications



MEDICAL (GENERAL) HISTORY





                    Type                Description         Date

 

                    Surgical History    No know Surgical history  

 

                    Hospitalization History childbirth          

 

                    Hospitalization History childbirth          2019

## 2020-07-18 NOTE — ED ABDOMINAL PAIN
General


Chief Complaint:  Abdominal/GI Problems


Stated Complaint:  ABD / BACK PAIN


Source of Information:  Patient


Exam Limitations:  No Limitations





History of Present Illness


Date Seen by Provider:  2020


Time Seen by Provider:  11:30


Initial Comments


Patient presents ER by private conveyance with chief complaint that she had 

Chinese food last night started having some nausea and a couple hours after that

and then about 2 in the morning she woke up with pain in her epigastric region 

radiating towards her back and left upper quadrant. No fever or sore throat. No 

history of abdominal surgeries or prior past medical history. No family medical 

history of. She's having nausea presently. She tried eat some toast this morning

and vomited it up. She did take some ibuprofen 200 mg 3 in the morning without 

significant relief. She did not take any antacids. No diarrhea or constipation 

fever or chills. Presently rates her pain as a 6 out of 10.





Allergies and Home Medications


Allergies


Coded Allergies:  


     No Known Drug Allergies (Unverified , 10/31/18)





Home Medications


Acetaminophen 500 Mg Tablet, 1,000 MG PO Q8H


   Prescribed by: MAGALIE WISEMAN on 3/19/19 0240


Ferrous Sulfate 325 Mg Tablet, 325 MG PO DAILY, (Reported)


Ibuprofen 600 Mg Tablet, 600 MG PO Q6H


   Prescribed by: MAGALIE WISEMAN on 3/19/19 0240


Prenatal Vit W-Ca,Fe,FA(<1 mg) 1 Each Tablet, 1 EACH PO DAILY, (Reported)





Patient Home Medication List


Home Medication List Reviewed:  Yes





Review of Systems


Review of Systems


Constitutional:  No chills, No diaphoresis


EENTM:  No Blurred Vision, No Double Vision


Respiratory:  Denies Cough, Denies Shortness of Air


Cardiovascular:  Denies Chest Pain, Denies Edema


Gastrointestinal:  See HPI, Abdominal Pain; Denies Constipated, Denies Diarrhea;

Nausea, Poor Fluid Intake; Denies Vomiting


Genitourinary:  Denies Burning, Denies Discharge


Musculoskeletal:  No back pain, No joint pain





All Other Systems Reviewed


Negative Unless Noted:  Yes





Past Medical-Social-Family Hx


Patient Social History


Alcohol Use:  Denies Use


Recreational Drug Use:  No


Smoking Status:  Never a Smoker


2nd Hand Smoke Exposure:  No


Recent Foreign Travel:  No


Contact w/Someone Who Travel:  No


Recent Hopitalizations:  No


Physical Abuse:  No


Sexual Abuse:  No





Immunizations Up To Date


Tetanus Booster (TDap):  Less than 5yrs





Seasonal Allergies


Seasonal Allergies:  No





Past Medical History


Surgeries:  No


Respiratory:  No


Cardiac:  No


Neurological:  No


Sexually Transmitted Disease:  No


HIV/AIDS:  No


Genitourinary:  No


Gastrointestinal:  No


Musculoskeletal:  No


Endocrine:  No


HEENT:  No


Cancer:  No


Psychosocial:  No


Integumentary:  No


Blood Disorders:  No


Adverse Reaction/Blood Tranf:  No





Family Medical History





Autism in sibling


  G8 SISTER


Hypertension


  19 FATHER


  19 MOTHER





Physical Exam


Vital Signs





Vital Signs - First Documented








 20





 11:25


 


Temp 36.4


 


Pulse 80


 


Resp 18


 


B/P (MAP) 122/75 (91)


 


Pulse Ox 100





Capillary Refill :


Height/Weight/BMI


Height: 5'5.00"


Weight: 171lbs. 8.0oz. 77.021289jh; 28.5 BMI


Method:Stated


General Appearance:  WD/WN, mild distress


HEENT:  PERRL/EOMI, pharynx normal


Respiratory:  lungs clear, normal breath sounds, no respiratory distress, no 

accessory muscle use


Cardiovascular:  normal peripheral pulses, regular rate, rhythm


Peripheral Pulses:  2+ Radial Pulses (R), 2+ Radial Pulses (L)


Gastrointestinal:  normal bowel sounds, soft; No rebound; tenderness (epigastric

and right upper quadrant with positive Giraldo sign. Negative for Rovsing sign or

McBurney's point tenderness. Negative for psoas sign.)


Neurologic/Psychiatric:  alert, normal mood/affect, oriented x 3


Skin:  normal color, warm/dry





Progress/Results/Core Measures


Results/Orders


Lab Results





Laboratory Tests








Test


 20


11:30 20


11:35 Range/Units


 


 


Urine Color YELLOW    


 


Urine Clarity CLEAR    


 


Urine pH 8.5   5-9  


 


Urine Specific Gravity 1.015 L  1.016-1.022  


 


Urine Protein TRACE H  NEGATIVE  


 


Urine Glucose (UA) NEGATIVE   NEGATIVE  


 


Urine Ketones NEGATIVE   NEGATIVE  


 


Urine Nitrite NEGATIVE   NEGATIVE  


 


Urine Bilirubin NEGATIVE   NEGATIVE  


 


Urine Urobilinogen 1.0   < = 1.0  MG/DL


 


Urine Leukocyte Esterase NEGATIVE   NEGATIVE  


 


Urine RBC (Auto) NEGATIVE   NEGATIVE  


 


Urine RBC NONE    /HPF


 


Urine WBC 2-5    /HPF


 


Urine Squamous Epithelial


Cells 10-25 H


 


  /HPF





 


Urine Crystals NONE    /LPF


 


Urine Bacteria TRACE    /HPF


 


Urine Casts NONE    /LPF


 


Urine Mucus SMALL H   /LPF


 


Urine Culture Indicated NO    


 


White Blood Count


 


 4.7 


 4.3-11.0


10^3/uL


 


Red Blood Count


 


 4.55 


 4.35-5.85


10^6/uL


 


Hemoglobin  9.1 L 11.5-16.0  G/DL


 


Hematocrit  31 L 35-52  %


 


Mean Corpuscular Volume  68 L 80-99  FL


 


Mean Corpuscular Hemoglobin  20 L 25-34  PG


 


Mean Corpuscular Hemoglobin


Concent 


 30 L


 32-36  G/DL





 


Red Cell Distribution Width  20.5 H 10.0-14.5  %


 


Platelet Count


 


 527 H


 130-400


10^3/uL


 


Mean Platelet Volume  9.4  7.4-10.4  FL


 


Neutrophils (%) (Auto)  61  42-75  %


 


Lymphocytes (%) (Auto)  29  12-44  %


 


Monocytes (%) (Auto)  6  0-12  %


 


Eosinophils (%) (Auto)  3  0-10  %


 


Basophils (%) (Auto)  1  0-10  %


 


Neutrophils # (Auto)  2.9  1.8-7.8  X 10^3


 


Lymphocytes # (Auto)  1.4  1.0-4.0  X 10^3


 


Monocytes # (Auto)  0.3  0.0-1.0  X 10^3


 


Eosinophils # (Auto)


 


 0.2 


 0.0-0.3


10^3/uL


 


Basophils # (Auto)


 


 0.1 


 0.0-0.1


10^3/uL


 


Sodium Level  139  135-145  MMOL/L


 


Potassium Level  3.5 L 3.6-5.0  MMOL/L


 


Chloride Level  108 H   MMOL/L


 


Carbon Dioxide Level  19 L 21-32  MMOL/L


 


Anion Gap  12  5-14  MMOL/L


 


Blood Urea Nitrogen  8  7-18  MG/DL


 


Creatinine


 


 0.78 


 0.60-1.30


MG/DL


 


Estimat Glomerular Filtration


Rate 


 > 60 


  





 


BUN/Creatinine Ratio  10   


 


Glucose Level  87    MG/DL


 


Calcium Level  8.9  8.5-10.1  MG/DL


 


Corrected Calcium    8.5-10.1  MG/DL


 


Total Bilirubin  0.6  0.1-1.0  MG/DL


 


Aspartate Amino Transf


(AST/SGOT) 


 48 H


 5-34  U/L





 


Alanine Aminotransferase


(ALT/SGPT) 


 21 


 0-55  U/L





 


Alkaline Phosphatase  79    U/L


 


Total Protein  7.8  6.4-8.2  GM/DL


 


Albumin  4.6 H 3.2-4.5  GM/DL


 


Lipase  12  8-78  U/L








My Orders





Orders - MARJORIE KEBEDE


Ua Culture If Indicated (20 11:29)


Urine Pregnancy Bedside (20 11:29)


Ketorolac Injection (Toradol Injection) (20 11:45)


Pantoprazole Injection (Protonix Injecti (20 11:45)


Comprehensive Metabolic Panel (20 11:44)


Lipase (20 11:44)


Ed Iv/Invasive Line Start (20 11:44)


Lactated Ringers (Lr 1000 Ml Iv Solution (20 11:44)


Ct Abdomen/Pelvis W (20 11:44)


Cbc With Automated Diff (20 11:44)


Ondansetron Injection (Zofran Injectio (20 12:00)


Iohexol Injection (Omnipaque 350 Mg/Ml 1 (20 12:15)


Received Contrast (Hold Metformin- Contr (20 12:15)


Ns (Ivpb) (Sodium Chloride 0.9% Ivpb Bag (20 12:15)





Medications Given in ED





Current Medications








 Medications  Dose


 Ordered  Sig/Shawanda


 Route  Start Time


 Stop Time Status Last Admin


Dose Admin


 


 Iohexol  100 ml  ONCE  ONCE


 IV  20 12:15


 20 12:16 DC 20 12:14


83 ML


 


 Ketorolac


 Tromethamine  30 mg  ONCE  ONCE


 IVP  20 11:45


 20 11:47 DC 20 11:54


30 MG


 


 Lactated Ringer's  1,000 ml @ 


 0 mls/hr  Q0M ONCE


 IV  20 11:44


 20 11:47 DC 20 11:54


1,000 MLS/HR


 


 Ondansetron HCl  4 mg  ONCE  ONCE


 IVP  20 12:00


 20 12:01 DC 20 11:54


4 MG


 


 Pantoprazole  40 mg  ONCE  ONCE


 IV  20 11:45


 20 11:47 DC 20 11:54


40 MG


 


 Sodium Chloride  100 ml  ONCE  ONCE


 IV  20 12:15


 20 12:16 DC 20 12:15


80 ML








Vital Signs/I&O











 7/18/20





 11:25


 


Temp 36.4


 


Pulse 80


 


Resp 18


 


B/P (MAP) 122/75 (91)


 


Pulse Ox 100











Progress


Progress Note :  


   Time:  11:51


Progress Note


Suspect gallbladder versus less likely PUD/pancreatitis/other. Plan to get labs 

including lipase and urinalysis. Bedside presence he was negative. Ultrasound 

not available sort and a CT of her abdomen and pelvis with IV contrast. Liter of

lactated Ringer's, Zofran, pantoprazole and Toradol for her symptoms.





Diagnostic Imaging





   Diagonstic Imaging:  CT (with IV contrast)


   Plain Films/CT/US/NM/MRI:  abdomen, pelvis


Comments


NAME:   JENNIFER NEGRETE


MED REC#:   [id number]


ACCOUNT#:   [id number]


PT STATUS:   REG ER


:   1990


PHYSICIAN:   MARJORIE KEBEDE MD


ADMIT DATE:   20/ER


                                   ***Draft***


Date of Exam:20





CT ABDOMEN/PELVIS W








PROCEDURE: CT abdomen and pelvis with contrast.





TECHNIQUE: Multiple contiguous axial images were obtained through


the abdomen and pelvis after administration of intravenous


contrast. Auto Exposure Controls were utilized during the CT exam


to meet ALARA standards for radiation dose reduction. 





INDICATION:  Bilateral flank pain. Nausea and vomiting. 





COMPARISON: None.





FINDINGS: Hypoattenuating and hypoenhancing mass in the right


hepatic lobe measures 2.9 x 3.4 cm. This contains a hyperdensity


on both contrast and delayed images measuring 0.7 cm. The


gallbladder, pancreas, spleen, adrenals, kidneys, collecting


systems and bladder are negative. Peripherally enhancing cyst in


the right ovary measuring up to 2.1 cm likely represents a corpus


luteum. Scant free fluid dependently in the pelvis. Normal


appendix. No free intraperitoneal air. No lymphadenopathy. No


evidence of bowel obstruction or inflammation. No acute osseous


findings.





IMPRESSION: 


1. Hypoattenuating mass in the right hepatic lobe containing a


central hyperattenuation. This is indeterminate and a parasitic


infection could have a similar appearance. Recommend nonemergent


followup with dedicated MRI with contrast.


2. Peripheral enhancing cyst in the right ovary likely represents


a corpus luteum. Scant free fluid dependently in the pelvis may


be physiologic.


3. CT abdomen and pelvis is otherwise unremarkable.








  Dictated on workstation # [id number]








Dict:   20 [id number]


Trans:   20 1253


[facility] [id number]





Interpreted by:     YOSELYN HACKETT MD


Electronically signed by:


   Reviewed:  Reviewed by Me





Departure


Impression





   Primary Impression:  


   Biliary colic


   Additional Impression:  


   Liver lesion


Disposition:  01 HOME, SELF-CARE


Condition:  Stable





Departure-Patient Inst.


Decision time for Depature:  13:15


Referrals:  


LARRY WORTHINGTON MD





NO,LOCAL PHYSICIAN (PCP)


Primary Care Physician


Patient Instructions:  Cysts in the Liver, Gallbladder Diet





Add. Discharge Instructions:  


I suspect that the pain you're experiencing is from your gallbladder. I would 

suggest you change your diet to include high fiber low fats and avoid spicy 

foods.


Start taking omeprazole 40 mg daily for the next 30 days.


Call and make an appointment with Dr. Worthington, general surgery to discuss further 

workup your pain.


Tylenol 1000 mg every 8 hours as necessary for pain.


Ibuprofen 800 mg every 8 hours as necessary for pain.


You may also use Maalox, Tums, Rolaids etc. for indigestion or stomach pain.


Ondansetron one tablet under the tongue every 6 hours as necessary for nausea or

vomiting.


Return to the ER if you develop fever, intractable pain and/or nausea or other 

worrisome symptoms.


Monday you may call the scheduling department and set up an ultrasound of your 

gallbladder outpatient. Results will be forwarded to Dr. Worthington's office.


All discharge instructions reviewed with patient and/or family. Voiced 

understanding.


Scripts


Omeprazole (Omeprazole) 40 Mg Capsule.dr


40 MG PO DAILY for 30 Days, #30 CAP 0 Refills


   Prov: MARJORIE KEBEDE         20 


Ondansetron (Ondansetron Odt) 4 Mg Tab.rapdis


4 MG PO Q6H PRN for NAUSEA/VOMITING, #8 TAB 0 Refills


   Prov: MARJORIE KEBEDE         20





Copy


Copies To 1:   LARRY WORTHINGTON MD, TITUS J                 2020 11:49

## 2020-07-18 NOTE — XMS REPORT
[facility]

                             Created on: 2019



Romy Leida

External Reference #: [id number]

: 1990

Sex: Female



Demographics





                          Address                   [address] [place]  [address]

 

                          Preferred Language        Unknown

 

                          Marital Status            Unknown

 

                          Hoahaoism Affiliation     Unknown

 

                          Race                      Unknown

 

                          Ethnic Group              Unknown





Author





                          Author                    Leida ALBA

 

                          Organization              [facility]

 

                          Address                   [address] [place]  [address]



 

                          Phone                     [phone]







Care Team Providers





                    Care Team Member Name Role                Phone

 

                    MACRINA ALBA     Unavailable         [phone]







PROBLEMS





          Type      Condition ICD9-CM Code IVH19-EV Code Onset Dates Condition S

tatus SNOMED 

Code

 

             Problem      Encounter for insertion of intrauterine contraceptive 

device              Z30.430       

                          Active                    71688791

 

          Problem   Microcytic anemia           D50.9               Active    23

[id number]

 

          Problem   Itching             L29.9               Active    730574965

 

          Problem   Bug bites           W57.XXXA            Active    [id number]







ALLERGIES

No Information



ENCOUNTERS





                Encounter       Location        Date            Diagnosis

 

                          [facility]  [id number]

[address] [place] [address] 

[phone]                              Viral upper respiratory trac

t infection J06.9

 

                          [facility]  [id number]

[address] [place] [address] 

[phone]                31 Oct, 2018               

 

                          [facility]     [id number]

[address] [place] [address]

                          24 Oct, 2018              Prenatal care in second trim

orville Z34.92

 

                          [facility]     [id number]

[address] [place] [address]

                          19 Oct, 2018              Microcytic anemia D50.9

 

                          [facility]     [id number]

[address] [place] [address]

                          03 Oct, 2018              Microcytic anemia D50.9

 

                          [facility]     [id number]

[address] [place] [address]

                          26 Sep, 2018              Multigravida in first trimes

ter Z34.81 and Normal pregnancy in 

multigravida Z34.80

 

                          [facility]     [id number]

[address] [place] [phone]

                          20 Sep, 2018               

 

                          [facility]     [address] [id number]

[address] [place] [phone]

                          14 Sep, 2018               

 

                          [facility]     [address] [facility] [id number]

[address] [place] [address]

                          11 Sep, 2018               

 

                          [facility]     [address] [facility] [id number]

[address] [place] [address]

                          07 Sep, 2018              Encounter for pregnancy test

 Z32.00

 

                          [facility]  [address] [facility] [id number]

[address] [place] [address] 

[phone]                              Irritant contact dermatitis 

due to other chemical 

products L24.5

 

                          [facility]     [address] [id number]

[address] [place] [address]

                                        Frequent headaches R51

 

                          [facility]  [address] [facility] [id number]

[address] [place] [address] 

[phone]                              Frequent headaches R51

 

                          [facility]  [facility] N [facility] [id number]

[address] [place] [address] 

[address]                              Other viral agents as the ca

use of diseases classified 

elsewhere B97.89 and Acute upper respiratory infection, unspecified J06.9

 

                          [address] [facility] [id number]

[address] [place] [address]

                                        Pyelonephritis N12

 

                          [address] [facility] [id number]

[address] [place] [address]

                                        Leukorrhea N89.8 ; Acute vag

initis N76.0 and Other specified 

bacterial agents as the cause of diseases classified elsewhere B96.89

 

                          [facility]     [address] [id number]

[address] [place] [address]

                          29 Mar, 2016              Right ovarian cyst N83.20

 

                          [address] [facility] [id number]

[address] [place] [address]

                                         

 

                          [facility]     [address] [facility] [id number]

[address] [place] [address]

                                         

 

                          [address] [facility] [id number]

[address] [place] [phone]

                          13 2016              Right ovarian cyst N83.20

 

                          [address]

[address] [place] [address]

                          11 2016              Encounter for insertion of i

ntrauterine contraceptive device 

Z30.430

 

                          [address] [place] [address]

                          07 2016              Encounter for counseling reg

arding contraception Z30.9

 

                          [address] [address]

                          04 2016               

 

                          [address] [address]

                          18 Dec, 2015              Well woman exam Z01.419 ; We

ight gain R63.5 and BMI 27.0-27.9,adult

Z68.27

 

                          [address] [address]

                          18 Dec, 2015              Erythema nodosum L52 and Fat

igue R53.83

 

                          [address] [address]

                          16 Dec, 2015              Erythema nodosum L52

 

                          [address] [address]

                          30 2015              General counseling and advic

e for contraceptive management Z30.09 

and OCP (oral contraceptive pills) initiation Z30.011

 

                          [address] [address]

                          27 2015              Bug bites W57.XXXA and Itchi

ng L29.9

 

                          [address]

[address] [place] [address]

                          19 2015              Well woman exam Z01.419 ; We

ight gain R63.5 and BMI 27.0-27.9,adult

Z68.27

 

                          [address]

[address] [place] [phone]

                          27 Oct, 2015              Ankle pain, left M25.572

 

                          [facility]   [address] [id number]

[address] [id number]

                          12 May, 2015              Dental examination V72.2

 

                          [facility]   [address] [id number]

[address] [place] [id number]

                          08 May, 2015              Dental examination V72.2

 

                          [facility]     [address] [id number]

[address] [place] [phone]

                                         

 

                          [facility]     [address] [id number]

[address] [place] [address]

                                         

 

                          [facility]     [address] [id number]

[address] [place] [address]

                                         

 

                          [facility]     [address] [id number]

[address] [place] [address]

                                         

 

                          [facility]     [address] [id number]

[address] [place] [phone]

                                         

 

                          [facility]     [address] [id number]

[address] [place] [phone]

                                         

 

                          [facility]     [address] [id number]

[address] [place] [phone]

                                         

 

                          [facility]     [address] [id number]

[address] [place] [address]

                                         

 

                          [facility]     [address] [id number]

[address] [place] [phone]

                          09 Oct, 2014               

 

                          [facility]     [address] [id number]

[address] [place] [phone]

                          09 Oct, 2014               

 

                          [facility]     [address] [id number]

[address] [place] [phone]

                          29 Sep, 2014               

 

                          [facility]     [address] [id number]

[address] [place] [phone]

                          29 Sep, 2014               

 

                          [facility]     [address] [id number]

[address] [place] [phone]

                          22 Sep, 2014               

 

                          [facility]     [address] [id number]

[address] [place] [address], [place] [address]

                          22 Sep, 2014               

 

                          [facility]     [address] [id number]

[address] [place] [address], [place] [address]

                          22 Aug, 2014               

 

                          [facility]     [address] [id number]

[address] [place] [address], [place] [address]

                          22 Aug, 2014               

 

                          [facility]     [address] [id number]

[address] [place] [address], [place] [address]

                          19 May, 2014               

 

                          [facility]     [address] [id number]

[address] [place] [address], [place] [address]

                          19 May, 2014               

 

                          [facility]     [address] [id number]

[address] [place] [address], [place] [address]

                          10 Apr, 2014               

 

                          [facility]     [address] [id number]

[address] [place] [address], [place] [address]

                          10 Apr, 2014               

 

                          [facility]     [address] [id number]

[address] [place] [address], [place] [address]

                                         

 

                          [facility]     [address] [id number]

[address] [place] [address], [place] [address]

                                         

 

                          [facility]     [address] [id number]

[address] [place] [address], [place] [address]

                                         

 

                          [facility]     [address] [id number]

[address] [place] [address], [place] [address]

                                         

 

                          [facility]     [address] [id number]

[address] [place] [address], [place] [address]

                          10 Feb, 2014               

 

                          [facility]     [address] [id number]

[address] [place] [address], [place] [address]

                          10 Feb, 2014               

 

                          [facility]     [address] [id number]

[address] [place] [address], [place] [address]

                                         

 

                          [facility]     [address] [id number]

[address] [place] [address], [place] [address]

                                         

 

                          [facility]     [address] [id number]

[address] [place] [address], [place] [address]

                                         

 

                          [facility]     [address] [id number]

[address] [place] [address], [place] [address]

                                         

 

                          [facility]     [address] [id number]

[address] [place] [address], [place] [address]

                                         

 

                          [facility]     [address] [id number]

[address] [place] [address], [place] [address]

                                         

 

                          [facility]     [address] [id number]

[address] [place] [address], [place] [address]

                                         

 

                          [facility]     [address] [id number]

[address] [place] [address], [place] [address]

                                         

 

                          [facility]     [address] [id number]

[address] [place] [address], [place] [address]

                                         

 

                          [facility]     [address] [id number]

[address] [place] [address], [place] [address]

                                         

 

                          [facility]     [address] [id number]

[address] [place] [address], [place] [address]

                                         

 

                          [facility]     [address] [id number]

[address] [place] [address], [place] [address]

                                         

 

                          [facility]     [address] [id number]

[address] [place] [address], [place] [address]

                          28 Mar, 2013               

 

                          [facility]     [address] [id number]

[address] [place] [address], [place] [address]

                          25 Mar, 2013               

 

                          [facility]     [address] [id number]

[address] [place] [address], [place] [address]

                          07 Mar, 2013               

 

                          [facility]     [address] [id number]

[address] [place] [address], [place] [address]

                          04 Mar, 2013               

 

                          [facility]     [address] [id number]

[address] [place] [address], [place] [address]

                          01 Mar, 2013               

 

                          [facility]     [address] [id number]

[address] [place] [address], [place] [address]

                                         

 

                          [facility]     [address] [id number]

[address] [place] [phone]

                                         

 

                          [facility]     [address] [facility] [id number]

[address] [place] [address]

                                         

 

                          [facility]     [address] [facility] [id number]

[address] [place] [phone]

                          25 Oct, 2012               

 

                          [facility]     [address] [facility] [id number]

[address] [place] [address]

                          25 Oct, 2012               

 

                          [facility]     [address] [facility] [id number]

[address] [place] [phone]

                          03 Oct, 2012               

 

                          [facility]     [address] [facility] [id number]

[address] [place] [address]

                                         

 

                          [facility]     [address] [facility] [id number]

[address] [place] [phone]

                                         







IMMUNIZATIONS

No Known Immunizations



SOCIAL HISTORY

Never Assessed



REASON FOR VISIT





PLAN OF CARE





VITAL SIGNS





MEDICATIONS

No Known Medications



RESULTS

No Results



PROCEDURES

No Known procedures



INSTRUCTIONS





MEDICATIONS ADMINISTERED

No Known Medications



MEDICAL (GENERAL) HISTORY





                    Type                Description         Date

 

                    Surgical History    No know Surgical history  

 

                    Hospitalization History childbirth          

 

                    Hospitalization History childbirth          2019

## 2020-07-18 NOTE — XMS REPORT
[facility]

                             Created on: 2020



Romy Leida

External Reference #: [id number]

: 1990

Sex: Female



Demographics





                          Address                   [address] [place]  [address]

 

                          Preferred Language        Unknown

 

                          Marital Status            Unknown

 

                          Synagogue Affiliation     Unknown

 

                          Race                      Unknown

 

                          Ethnic Group              Unknown





Author





                          Author                    Leida GRANADOS

 

                          [facility]

 

                          Address                   [address] [place]  [address]



 

                          Phone                     [phone]







Care Team Providers





                    Care Team Member Name Role                Phone

 

                    SONU GRANADOS     Unavailable         [phone]







PROBLEMS





          Type      Condition ICD9-CM Code SWT89-EB Code Onset Dates Condition S

tatus SNOMED 

Code

 

             Problem      Encounter for insertion of intrauterine contraceptive 

device              Z30.430       

                          Active                    88222302

 

          Problem   Microcytic anemia           D50.9               Active    23

[id number]

 

          Problem   Itching             L29.9               Active    [id number]

 

          Problem   Bug bites           W57.XXXA            Active    [id number]







ALLERGIES

No Information



ENCOUNTERS





                Encounter       Location        Date            Diagnosis

 

                          [facility]  [address] [id number]

[address] [place] [address] 

[phone]                              Viral upper respiratory trac

t infection J06.9

 

                          [facility]  [address] [id number]

[address] [place] [address] 

[phone]                31 Oct, 2018               

 

                          [facility]     [address] [id number]

[address] [place] [address]

                          24 Oct, 2018              Prenatal care in second trim

[place]ville Z34.92

 

                          [facility]     [address] [id number]

[address] [place] [address]

                          19 Oct, 2018              Microcytic anemia D50.9

 

                          [facility]     [address] [id number]

[address] [place] [address]

                          03 Oct, 2018              Microcytic anemia D50.9

 

                          [facility]     [address] [id number]

[address] [place] [address]

                          26 Sep, 2018              Multigravida in first trimes

ter Z34.81 and Normal pregnancy in 

multigravida Z34.80

 

                          [facility]     [address] [id number]

[address] [place] [address]

                          20 Sep, 2018               

 

                          [place] [address] [id number]

[address] [place] [address]

                          14 Sep, 2018               

 

                          [facility]     [address] [id number]

[address] [place] [phone]

                          11 Sep, 2018               

 

                          [facility]     [address] [id number]

[address] [place] [address]

                          07 Sep, 2018              Encounter for pregnancy test

 Z32.00

 

                          [facility]  [address] [id number]

[address] [place] [address] 

[address]                              Irritant contact dermatitis 

due to other chemical 

products L24.5

 

                          [facility]     [address] [id number]

[address] [place] [address]

                                        Frequent headaches R51

 

                          [facility]  [address] [id number]

[address] [place] [address] 

[address]                              Frequent headaches R51

 

                          [facility]  [address] [id number]

[address] [place] [address] 

[address]                              Other viral agents as the ca

use of diseases classified 

elsewhere B97.89 and Acute upper respiratory infection, unspecified J06.9

 

                          [facility]     [address] [id number]

[address] [place] [phone]

                                        Pyelonephritis N12

 

                          [place] [address] [id number]

[address] [place] [address]

                                        Leukorrhea N89.8 ; Acute vag

initis N76.0 and Other specified 

bacterial agents as the cause of diseases classified elsewhere B96.89

 

                          [facility]     [address] [id number]

[address] [place] [address]

                          29 Mar, 2016              Right ovarian cyst N83.20

 

                          [place] [address] [id number]

[address] [place] [address]

                                         

 

                          [facility]     [address] [id number]

[address] [place] [address]

                                         

 

                          [facility]     [address] [id number]

[address] [place] [phone]

                          13 2016              Right ovarian cyst N83.20

 

                          [place] [address] [phone]

                          11 2016              Encounter for insertion of i

ntrauterine contraceptive device 

Z30.430

 

                          [address] [address] [address] [address]

                          07 2016              Encounter for counseling reg

arding contraception Z30.9

 

                          [place] [address] [address]

                          04 2016               

 

                          [place] [address] [address]

                          18 Dec, 2015              Well woman exam Z01.419 ; We

ight gain R63.5 and BMI 27.0-27.9,adult

Z68.27

 

                          [address] [address]

                          18 Dec, 2015              Erythema nodosum L52 and Fat

igue R53.83

 

                          [address] [address] [address]

                          16 Dec, 2015              Erythema nodosum L52

 

                          [place] [address] [address]

                          30 2015              General counseling and advic

e for contraceptive management Z30.09 

and OCP (oral contraceptive pills) initiation Z30.011

 

                          [place] [address] [address]

                          27 2015              Bug bites W57.XXXA and Itchi

ng L29.9

 

                          [place] [address] [place] [address]

                          19 2015              Well woman exam Z01.419 ; We

ight gain R63.5 and BMI 27.0-27.9,adult

Z68.27

 

                          [place] [address] [id number]

[address] [place] [address]

                          27 Oct, 2015              Ankle pain, left M25.572

 

                          [facility]   [address] [id number]

[address] [place] [id number]

                          12 May, 2015              Dental examination V72.2

 

                          [facility]   [address] [id number]

[address] [place] [id number]

                          08 May, 2015              Dental examination V72.2

 

                          [facility]     [address] [id number]

[address] [place] [address]

                                         

 

                          [facility]     [address] [id number]

[address] [place] [phone]

                                         

 

                          [facility]     [address] [id number]

[address] [place] [phone]

                                         

 

                          [facility]     [address] [id number]

[address] [place] [phone]

                                         

 

                          [facility]     [address] [id number]

[address] [place] [phone]

                                         

 

                          [facility]     [address] [id number]

[address] [place] [phone]

                                         

 

                          [facility]     [address] [id number]

[address] [place] [phone]

                                         

 

                          [facility]     [address] [id number]

[address] [place] [phone]

                                         

 

                          [facility]     [address] [id number]

[address] [place] [phone]

                          09 Oct, 2014               

 

                          [facility]     [address] [id number]

[address] [place] [phone]

                          09 Oct, 2014               

 

                          [facility]     [address] [id number]

[address] [place] [phone]

                          29 Sep, 2014               

 

                          [facility]     [address] [id number]

[address] [place] [phone]

                          29 Sep, 2014               

 

                          [facility]     [address] [id number]

[address] [place] [phone]

                          22 Sep, 2014               

 

                          [facility]     [address] [id number]

[address] [place] [address], [place] [address]

                          22 Sep, 2014               

 

                          [facility]     [address] [id number]

[address] [place] [address], [place] [address]

                          22 Aug, 2014               

 

                          [facility]     [address] [id number]

[address] [place] [address], [place] [address]

                          22 Aug, 2014               

 

                          [facility]     [address] [id number]

[address] [place] [address], [place] [address]

                          19 May, 2014               

 

                          [facility]     [address] [id number]

[address] [place] [address], [place] [address]

                          19 May, 2014               

 

                          [facility]     [address] [id number]

[address] [place] [address], [place] [address]

                          10 Apr, 2014               

 

                          [facility]     [address] [id number]

[address] [place] [address], [place] [address]

                          10 Apr, 2014               

 

                          [facility]     [address] [id number]

[address] [place] [address], [place] [address]

                                         

 

                          [facility]     [address] [id number]

[address] [place] [address], [place] [address]

                                         

 

                          [facility]     [address] [id number]

[address] [place] [address], [place] [address]

                                         

 

                          [facility]     [address] [id number]

[address] [place] [address], [place] [address]

                                         

 

                          [facility]     [address] [id number]

[address] [place] [address], [place] [address]

                          10 Feb, 2014               

 

                          [facility]     [address] [id number]

[address] [place] [address], [place] [address]

                          10 Feb, 2014               

 

                          [facility]     [address] [id number]

[address] [place] [address], [place] [address]

                                         

 

                          [facility]     [address] [id number]

[address] [place] [address], [place] [address]

                                         

 

                          [facility]     [address] [id number]

[address] [place] [address], [place] [address]

                                         

 

                          [facility]     [address] [id number]

[address] [place] [address], [place] [address]

                                         

 

                          [facility]     [address] [id number]

[address] [place] [address], [place] [address]

                                         

 

                          [facility]     [address] [id number]

[address] [place] [address], [place] [address]

                                         

 

                          [facility]     [address] [id number]

[address] [place] [address], [place] [address]

                                         

 

                          [facility]     [address] [id number]

[address] [place] [address], [place] [address]

                                         

 

                          [facility]     [address] [id number]

[address] [place] [address], [place] [address]

                                         

 

                          [facility]     [address] [id number]

[address] [place] [address], [place] [address]

                                         

 

                          [facility]     [address] [id number]

[address] [place] [address], [place] [address]

                                         

 

                          [facility]     [address] [id number]

[address] [place] [address], [place] [address]

                                         

 

                          [facility]     [address] [id number]

[address] [place] [address], [place] [address]

                          28 Mar, 2013               

 

                          [facility]     [address] [id number]

[address] [place] [address], [place] [address]

                          25 Mar, 2013               

 

                          [facility]     [address] [id number]

[address] [place] [address], [place] [address]

                          07 Mar, 2013               

 

                          [facility]     [address] [id number]

[address] [place] [address], [place] [address]

                          04 Mar, 2013               

 

                          [facility]     [address] [id number]

[address] [place] [address], [place] [address]

                          01 Mar, 2013               

 

                          [facility]     [address] [id number]

[address] [place] [address], [place] [address]

                                         

 

                          [facility]     [address] [id number]

[address] [place] [phone]

                                         

 

                          [facility]     [address] [id number]

[address] [place] [address]

                                         

 

                          [facility]     [address] [id number]

[address] [place] [address]

                          25 Oct, 2012               

 

                          [facility]     [address] [id number]

[address] [place] [phone]

                          25 Oct, 2012               

 

                          [facility]     [address] [id number]

[address] [place] [phone]

                          03 Oct, 2012               

 

                          [facility]     [address] [id number]

[address] [place] [address]

                                         

 

                          [facility]     [address] [id number]

[address] [place] [address]

                                         







IMMUNIZATIONS

No Known Immunizations



SOCIAL HISTORY

Never Assessed



REASON FOR VISIT





PLAN OF CARE





VITAL SIGNS





MEDICATIONS

No Known Medications



RESULTS

No Results



PROCEDURES

No Known procedures



INSTRUCTIONS





MEDICATIONS ADMINISTERED

No Known Medications



MEDICAL (GENERAL) HISTORY





                    Type                Description         Date

 

                    Surgical History    No know Surgical history  

 

                    Hospitalization History childbirth          

 

                    Hospitalization History childbirth          2019

## 2020-07-18 NOTE — XMS REPORT
[facility]

                             Created on: 08/10/2019



Romy Leida

External Reference #: [id number]

: 1990

Sex: Female



Demographics





                          Address                   [address] [place]  [address]

 

                          Preferred Language        Unknown

 

                          Marital Status            Unknown

 

                          Jain Affiliation     Unknown

 

                          Race                      Unknown

 

                          Ethnic Group              Unknown





Author





                          Author                    Leida ALBA

 

                          Organization              [facility]

 

                          Address                   [address] [place]  [address]



 

                          Phone                     [phone]







Care Team Providers





                    Care Team Member Name Role                Phone

 

                    MACRINA ALBA     Unavailable         [phone]







PROBLEMS





          Type      Condition ICD9-CM Code TJL59-ET Code Onset Dates Condition S

tatus SNOMED 

Code

 

             Problem      Encounter for insertion of intrauterine contraceptive 

device              Z30.430       

                          Active                    [id number]

 

          Problem   Microcytic anemia           D50.9               Active    23

[id number]

 

          Problem   Itching             L29.9               Active    324451452

 

          Problem   Bug bites           W57.XXXA            Active    218449171







ALLERGIES

No Information



ENCOUNTERS





                Encounter       Location        Date            Diagnosis

 

                          [facility]  [id number]

[address] [place] [address] 

[phone]                              Viral upper respiratory trac

t infection J06.9

 

                          [facility] WALK IN [facility]  [address] [id number]

[address] [place] [address] 

[phone]                31 Oct, 2018               

 

                          [facility]     [address] [id number]

[address] [place] [address]

                          24 Oct, 2018              Prenatal care in second trim

[place]ville Z34.92

 

                          [facility]     [address] [id number]

[address] [place] [address]

                          19 Oct, 2018              Microcytic anemia D50.9

 

                          [facility]     [address] [id number]

[address] [place] [address]

                          03 Oct, 2018              Microcytic anemia D50.9

 

                          [facility]     [address] [id number]

[address] [place] [address]

                          26 Sep, 2018              Multigravida in first trimes

ter Z34.81 and Normal pregnancy in 

multigravida Z34.80

 

                          [facility]     [address] [id number]

[address] [place] [address]

                          20 Sep, 2018               

 

                          [facility]     [address] [id number]

[address] [place] [address]

                          14 Sep, 2018               

 

                          [facility]     [address] [facility] [id number]

[address] [place] [address]

                          11 Sep, 2018               

 

                          [facility]     [address] [facility] [id number]

[address] [place] [address]

                          07 Sep, 2018              Encounter for pregnancy test

 Z32.00

 

                          [facility]  [address] [facility] [id number]

[address] [place] [address] 

[address]                              Irritant contact dermatitis 

due to other chemical 

products L24.5

 

                          [facility]     [address] [id number]

[address] [place] [address]

                                        Frequent headaches R51

 

                          [facility]  [address] [facility] [id number]

[address] [place] [address] 

[address]                              Frequent headaches R51

 

                          [facility]  [facility] N [facility] [id number]

[address] [place] [address] 

[phone]                              Other viral agents as the ca

use of diseases classified 

elsewhere B97.89 and Acute upper respiratory infection, unspecified J06.9

 

                          [address] [facility] [id number]

[address] [place] [address]

                                        Pyelonephritis N12

 

                          [address] [facility] [id number]

[address] [place] [address]

                                        Leukorrhea N89.8 ; Acute vag

initis N76.0 and Other specified 

bacterial agents as the cause of diseases classified elsewhere B96.89

 

                          [facility]     [address] [id number]

[address] [place] [address]

                          29 Mar, 2016              Right ovarian cyst N83.20

 

                          [address] [facility] [id number]

[address] [place] [address]

                                         

 

                          [facility]     [address] [facility] [id number]

[address] [place] [address]

                                         

 

                          [address] [address] [facility] [id number]

[address] [place] [address]

                          13 2016              Right ovarian cyst N83.20

 

                          [address]

[address] [place] [address]

                          11 2016              Encounter for insertion of i

ntrauterine contraceptive device 

Z30.430

 

                          [address] [place] [address]

                          07 2016              Encounter for counseling reg

arding contraception Z30.9

 

                          [address] [address]

                          04 2016               

 

                          [address] [address]

                          18 Dec, 2015              Well woman exam Z01.419 ; We

ight gain R63.5 and BMI 27.0-27.9,adult

Z68.27

 

                          [address] [address]

                          18 Dec, 2015              Erythema nodosum L52 and Fat

igue R53.83

 

                          [address] [address]

                          16 Dec, 2015              Erythema nodosum L52

 

                          [address] [address]

                          30 2015              General counseling and advic

e for contraceptive management Z30.09 

and OCP (oral contraceptive pills) initiation Z30.011

 

                          [address] [address]

                          27 2015              Bug bites W57.XXXA and Itchi

ng L29.9

 

                          [address]

[address] [place] [address]

                          19 2015              Well woman exam Z01.419 ; We

ight gain R63.5 and BMI 27.0-27.9,adult

Z68.27

 

                          [address]

[address] [place] [address]

                          27 Oct, 2015              Ankle pain, left M25.572

 

                          [facility]   [address] [id number]

[address] [id number]

                          12 May, 2015              Dental examination V72.2

 

                          [facility]   [address] [id number]

[address] [place] [id number]

                          08 May, 2015              Dental examination V72.2

 

                          [facility]     [address] [id number]

[address] [place] [address]

                                         

 

                          [facility]     [address] [id number]

[address] [place] [address]

                                         

 

                          [facility]     [address] [id number]

[address] [place] [phone]

                                         

 

                          [facility]     [address] [id number]

[address] [place] [address]

                                         

 

                          [facility]     [address] [id number]

[address] [place] [address]

                                         

 

                          [facility]     [address] [id number]

[address] [place] [address]

                                         

 

                          [facility]     [address] [id number]

[address] [place] [address]

                                         

 

                          [facility]     [address] [id number]

[address] [place] [address]

                                         

 

                          [facility]     [address] [id number]

[address] [place] [phone]

                          09 Oct, 2014               

 

                          [facility]     [address] [id number]

[address] [place] [address]

                          09 Oct, 2014               

 

                          [facility]     [address] [id number]

[address] [place] [address]

                          29 Sep, 2014               

 

                          [facility]     [address] [id number]

[address] [place] [phone]

                          29 Sep, 2014               

 

                          [facility]     [address] [id number]

[address] [place] [phone]

                          22 Sep, 2014               

 

                          [facility]     [address] [id number]

[address] [place] [address], [place] [address]

                          22 Sep, 2014               

 

                          [facility]     [address] [id number]

[address] [place] [address], [place] [address]

                          22 Aug, 2014               

 

                          [facility]     [address] [id number]

[address] [place] [address], [place] [address]

                          22 Aug, 2014               

 

                          [facility]     [address] [id number]

[address] [place] [address], [place] [address]

                          19 May, 2014               

 

                          [facility]     [address] [id number]

[address] [place] [address], [place] [address]

                          19 May, 2014               

 

                          [facility]     [address] [id number]

[address] [place] [address], [place] [address]

                          10 Apr, 2014               

 

                          [facility]     [address] [id number]

[address] [place] [address], [place] [address]

                          10 Apr, 2014               

 

                          [facility]     [address] [id number]

[address] [place] [address], [place] [address]

                                         

 

                          [facility]     [address] [id number]

[address] [place] [address], [place] [address]

                                         

 

                          [facility]     [address] [id number]

[address] [place] [address], [place] [address]

                                         

 

                          [facility]     [address] [id number]

[address] [place] [address], [place] [address]

                                         

 

                          [facility]     [address] [id number]

[address] [place] [address], [place] [address]

                          10 Feb, 2014               

 

                          [facility]     [address] [id number]

[address] [place] [address], [place] [address]

                          10 Feb, 2014               

 

                          [facility]     [address] [id number]

[address] [place] [address], [place] [address]

                                         

 

                          [facility]     [address] [id number]

[address] [place] [address], [place] [address]

                                         

 

                          [facility]     [address] [id number]

[address] [place] [address], [place] [address]

                                         

 

                          [facility]     [address] [id number]

[address] [place] [address], [place] [address]

                                         

 

                          [facility]     [address] [id number]

[address] [place] [address], [place] [address]

                                         

 

                          [facility]     [address] [id number]

[address] [place] [address], [place] [address]

                                         

 

                          [facility]     [address] [id number]

[address] [place] [address], [place] [address]

                                         

 

                          [facility]     [address] [id number]

[address] [place] [address], [place] [address]

                                         

 

                          [facility]     [address] [id number]

[address] [place] [address], [place] [address]

                                         

 

                          [facility]     [address] [id number]

[address] [place] [address], [place] [address]

                                         

 

                          [facility]     [address] [id number]

[address] [place] [address], [place] [address]

                                         

 

                          [facility]     [address] [id number]

[address] [place] [address], [place] [address]

                                         

 

                          [facility]     [address] [id number]

[address] [place] [address], [place] [address]

                          28 Mar, 2013               

 

                          [facility]     [address] [id number]

[address] [place] [address], [place] [address]

                          25 Mar, 2013               

 

                          [facility]     [address] [id number]

[address] [place] [address], [place] [address]

                          07 Mar, 2013               

 

                          [facility]     [address] [id number]

[address] [place] [address], [place] [address]

                          04 Mar, 2013               

 

                          [facility]     [address] [id number]

[address] [place] [address], [place] [address]

                          01 Mar, 2013               

 

                          [facility]     [address] [id number]

[address] [place] [address], [place] [address]

                                         

 

                          [facility]     [address] [id number]

[address] [place] [address]

                                         

 

                          [facility]     [address] [id number]

[address] [place] [address]

                                         

 

                          [facility]     [address] [id number]

[address] [place] [address]

                          25 Oct, 2012               

 

                          [facility]     [address] [id number]

[address] [place] [address]

                          25 Oct, 2012               

 

                          [facility]     [address] [id number]

[address] [place] [phone]

                          03 Oct, 2012               

 

                          [facility]     [address] [facility] [id number]

[address] [place] [address]

                                         

 

                          [facility]     [address] [facility] [id number]

[address] [place] [phone]

                                         







IMMUNIZATIONS

No Known Immunizations



SOCIAL HISTORY

Never Assessed



REASON FOR VISIT





PLAN OF CARE





VITAL SIGNS





                    Height              62 in               2014

 

                    Weight              150.7 lbs           2014

 

                    Temperature         97.1 degrees Fahrenheit 2014

 

                    Heart Rate          68 bpm              2014

 

                    Respiratory Rate    18                  2014

 

                    Blood pressure systolic 112 mmHg            2014

 

                    Blood pressure diastolic 62 mmHg             2014







MEDICATIONS

No Known Medications



RESULTS

No Results



PROCEDURES





                Procedure       Date Ordered    Result          Body Site

 

                CHEST X-RAY     2014                    







INSTRUCTIONS





MEDICATIONS ADMINISTERED

No Known Medications



MEDICAL (GENERAL) HISTORY





                    Type                Description         Date

 

                    Surgical History    No know Surgical history  

 

                    Hospitalization History childbirth          

 

                    Hospitalization History childbirth

## 2020-07-18 NOTE — XMS REPORT
[facility]

                             Created on: 2020



RomyLeida

External Reference #: [id number]

: 1990

Sex: Female



Demographics





                          Address                   [address] [place]  [address]

 

                          Preferred Language        Unknown

 

                          Marital Status            Unknown

 

                          Evangelical Affiliation     Unknown

 

                          Race                      Unknown

 

                          Ethnic Group              Unknown





Author





                          Author                    Leida Botello

 

                          Organization              [facility]

 

                          Address                   [address] [place]  [address]



 

                          Phone                     [phone]







Care Team Providers





                    Care Team Member Name Role                Phone

 

                    PHILIP Botello    Unavailable         [phone]







PROBLEMS





          Type      Condition ICD9-CM Code VXV86-NR Code Onset Dates Condition S

tatus SNOMED 

Code

 

             Problem      Encounter for insertion of intrauterine contraceptive 

device              Z30.430       

                          Active                    07434751

 

          Problem   Microcytic anemia           D50.9               Active    23

[id number]

 

          Problem   Itching             L29.9               Active    027998464

 

          Problem   Bug bites           W57.XXXA            Active    645804241







ALLERGIES

No Information



ENCOUNTERS





                Encounter       Location        Date            Diagnosis

 

                          [facility]  [address]

[address] [place] [address] 

[address]                              Viral upper respiratory trac

t infection J06.9

 

                          [facility] [place] [facility]  [id number]

[address] [place] [address] 

[address]                31 Oct, 2018               

 

                    [place] [address] [address] 24 

Oct, 2018                               Prenatal care in second trimester Z34.92

 

                    [place] [address] [address] 19 

Oct, 2018                               Microcytic anemia D50.9

 

                    [facility] [address] [address] 03 

Oct, 2018                               Microcytic anemia D50.9

 

                    [place] [address] [address] 26 

Sep, 2018                               Multigravida in first trimester Z34.81 a

nd Normal pregnancy in 

multigravida Z34.80

 

                    [place] [address] [address] 20 

Sep, 2018                                

 

                    [place] [address]

 [place] [address] 14 

Sep, 2018                                

 

                    [facility] [id number] [place] [address] 11 

Sep, 2018                                

 

                    [place] [id number] [place] [address] 07 

Sep, 2018                               Encounter for pregnancy test Z32.00

 

                          [facility]  [address] [facility] [id number]

[address] [place] [address] 

[address]                              Irritant contact dermatitis 

due to other chemical 

products L24.5

 

                    [place] [id number] [place] [address]                                Frequent headaches R51

 

                          [facility]  [facility] [address] [facility] [id number]

[address] [place] [address] 

[address]                              Frequent headaches R51

 

                          [facility]  [facility] [address] [facility] [id number]

[address] [place] [address] 

[address]                              Other viral agents as the ca

use of diseases classified 

elsewhere B97.89 and Acute upper respiratory infection, unspecified J06.9

 

                    [place] [id number] [place] [address]                                Pyelonephritis N12

 

                    [place] [address] [address]                                Leukorrhea N89.8 ; Acute vaginitis N76.0

 and Other specified bacterial

agents as the cause of diseases classified elsewhere B96.89

 

                    [place] [id number] [place] [address] 29 

Mar, 2016                               Right ovarian cyst N83.20

 

                    [place] [id number] [place] [address]                                 

 

                    [place] [address] [address]                                 

 

                    [place] [address] [address]                                Right ovarian cyst N83.20

 

                    [place] [address] [address] 11 

2016                               Encounter for insertion of intrauterine 

contraceptive device Z30.430

 

                    [place] [address] [place] [address] [id number] 07 

2016                               Encounter for counseling regarding contr

aception Z30.9

 

                    [address] [address] 04 

2016                                

 

                    [address] [address] 18 

Dec, 2015                               Well woman exam Z01.419 ; Weight gain R6

3.5 and BMI 27.0-27.9,adult 

Z68.27

 

                    [address] [address] 18 

Dec, 2015                               Erythema nodosum L52 and Fatigue R53.83

 

                    [address] [address] 16 

Dec, 2015                               Erythema nodosum L52

 

                    [address] [address] 30 

2015                               General counseling and advice for contra

ceptive management Z30.09 and 

OCP (oral contraceptive pills) initiation Z30.011

 

                    [address] [address] 27 

2015                               Bug bites W57.XXXA and Itching L29.9

 

                    [address] [address] 19 

2015                               Well woman exam Z01.419 ; Weight gain R6

3.5 and BMI 27.0-27.9,adult 

Z68.27

 

                    [address] [address] 27 

Oct, 2015                               Ankle pain, left M25.572

 

                    [facility] [address] [id number] 12 

May, 2015                               Dental examination V72.2

 

                    [facility] [address] [id number] 08 

May, 2015                               Dental examination V72.2

 

                    [facility] [address] [facility] [place] [address]                                 

 

                    [facility] [address] [facility] [place] [address]                                 

 

                    [facility] [address] [facility] [place] [address]                                 

 

                    [facility] [address] [facility] [place] [address]                                 

 

                    [facility] [address] [facility] [place] [address]                                 

 

                    [facility] [address] [facility] [place] [address]                                 

 

                    [facility] [address] [facility] [place] [address]                                 

 

                    [facility] [address] [facility] [place] [address]                                 

 

                    [facility] [address] [facility] [place] [address] 09 

Oct, 2014                                

 

                    [facility] [address] [facility] [place] [address] 09 

Oct, 2014                                

 

                    [facility] [address] [facility] [place] [address] 29 

Sep, 2014                                

 

                    [facility] [address] [facility] [place] [address] 29 

Sep, 2014                                

 

                    [facility] [address] [facility] [place] [address] 22 

Sep, 2014                                

 

                    [facility] [address] [facility] [place] [address] 22 

Sep, 2014                                

 

                    [facility] [address] [facility] [place] [address] 22 

Aug, 2014                                

 

                    [facility] [address] [facility] [place] [address] 22 

Aug, 2014                                

 

                    [facility] [address] [facility] [place] [address] 19 

May, 2014                                

 

                    [facility] [address] [facility] [id number] [place] [address] 19 

May, 2014                                

 

                    [facility] [address] [facility] [place] [address] 10 

Apr, 2014                                

 

                    [facility] [address] [facility] [place] [address] 10 

Apr, 2014                                

 

                    [facility] [address] [facility] [place] [address]                                 

 

                    [facility] [address] [facility] [place] [address]                                 

 

                    [facility] [address] [facility] [place] [address]                                 

 

                    [facility] [address] [facility] [place] [address]                                 

 

                    [facility] [address] [facility] [place] [address] 10 

Feb, 2014                                

 

                    [facility] [address] [facility] [place] [address] 10 

Feb, 2014                                

 

                    [facility] [address] [facility] [place] [address]                                 

 

                    [facility] [address] [facility] [place] [address]                                 

 

                    [facility] [address] [facility] [place] [address]                                 

 

                    [facility] [address] [facility] [place] [address]                                 

 

                    [facility] [address] [facility] [place] [address]                                 

 

                    [facility] [address] [facility] [place] [address]                                 

 

                    [facility] [address] [facility] [place] [address]                                 

 

                    [facility] [address] [facility] [id number] [place] [address]                                 

 

                    [facility] [address] [facility] [id number] [place] [address]                                 

 

                    [facility] [address] [id number] [place] [address]                                 

 

                    [facility] [address] [facility] [place] [address]                                 

 

                    [facility] [address] [facility] [id number] [place] [address]                                 

 

                    [facility] [address] [facility] [place] [address] 28 

Mar, 2013                                

 

                    [facility] [address] [facility] [place] [address] 25 

Mar, 2013                                

 

                    [facility] [address] [facility] [place] [address] 07 

Mar, 2013                                

 

                    [facility] [address] [facility] [place] [address] 04 

Mar, 2013                                

 

                    [facility] [address] [facility] [place] [address] 01 

Mar, 2013                                

 

                    [facility] [address] [facility] [place] [address]                                 

 

                    [facility] [address] [facility] [place] [address]                                 

 

                    [facility] [address] [facility] [place] [address]                                 

 

                    [facility] [address] [facility] [place] [address] 25 

Oct, 2012                                

 

                    [facility] [address] [facility] [place] [address] 25 

Oct, 2012                                

 

                    [facility] [address] [facility] [place] [address] 03 

Oct, 2012                                

 

                    [facility] [address] [facility] [place] [address]                                 

 

                    [facility] [address] [facility] [id number] [place] [address]                                 







IMMUNIZATIONS

No Known Immunizations



SOCIAL HISTORY

Never Assessed



REASON FOR VISIT





PLAN OF CARE





VITAL SIGNS





MEDICATIONS

No Known Medications



RESULTS

No Results



PROCEDURES

No Known procedures



INSTRUCTIONS





MEDICATIONS ADMINISTERED

No Known Medications



MEDICAL (GENERAL) HISTORY





                    Type                Description         Date

 

                    Surgical History    No know Surgical history  

 

                    Hospitalization History childbirth          

 

                    Hospitalization History childbirth          2019

## 2020-07-18 NOTE — XMS REPORT
Continuity of Care Document

                             Created on: 2020



Romy Leida

External Reference #: [id number]

: 1990

Sex: Female



Demographics





                          Address                   [address] [place]  [address]

 

                          Home Phone                [phone] x

 

                          Preferred Language        Unknown

 

                          Marital Status            Unknown

 

                          Rastafari Affiliation     Unknown

 

                          Race                      Unknown

 

                          Ethnic Group              Unknown





Author





                          Organization              Unknown

 

                          Address                   Unknown

 

                          Phone                     Unavailable



              



Allergies

      



             Active           Description           Code           Type         

  Severity   

                Reaction           Onset           Reported/Identified          

 

Relationship to Patient                 Clinical Status        

 

                Yes             No Known Drug Allergies           [id number]    

       Drug 

Allergy           Unknown           N/A                             10/31/2018  

      

                                                             



                  



Medications

      



There is no data.                  



Problems

      



             Date Dx Coded           Attending           Type           Code    

       

Diagnosis                               Diagnosed By        

 

             10/17/2018           JAMES DICKEY MD, Ot           Z36.

89           

ENCOUNTER FOR OTHER SPECIFIED                      

 

             10/17/2018           JAMES DICKEY MD, Ot           Z3A.

16           

16 WEEKS GESTATION OF PREGNANCY                    

 

             10/25/2018           JAMES DICKEY MD, Ot           Z36.

89           

ENCOUNTER FOR OTHER SPECIFIED                      

 

             10/25/2018           JAMES DICKEY MD, Ot3A.

16           

16 WEEKS GESTATION OF PREGNANCY                    

 

             10/31/2018           JAMES DICKEY MD, Ot           Z36.

89           

ENCOUNTER FOR OTHER SPECIFIED                      

 

             10/31/2018           JAMES DICKEY MD, Ot           Z3A.

16           

16 WEEKS GESTATION OF PREGNANCY                    

 

             10/31/2018           JAMES DICKEY MD, Ot           Z36.

89           

ENCOUNTER FOR OTHER SPECIFIED                      

 

             10/31/2018           JAMES DICKEY MD, Ot           Z3A.

16           

16 WEEKS GESTATION OF PREGNANCY                    

 

             10/31/2018           RIYA SMITH           Ot           O20.9   

        

HEMORRHAGE IN EARLY PREGNANCY, UNSPECIFI                    

 

             10/31/2018           BERNODIN RIYA           Ot           O23.41  

         UNSP

INFCT OF URINARY TRACT IN PREGNANCY                    

 

             10/31/2018           BERNODIN RIYA           Ot           Z3A.17  

         17 

WEEKS GESTATION OF PREGNANCY                     

 

             2018           BERNDAVID NEWBYIS           Ot           O20.9   

        

HEMORRHAGE IN EARLY PREGNANCY, UNSPECIFI                    

 

             2018           BERNODIN RIYA           Ot           O23.41  

         UNSP

INFCT OF URINARY TRACT IN PREGNANCY                    

 

             2018           BERNODIN RIYA           Ot           Z3A.17  

         17 

WEEKS GESTATION OF PREGNANCY                     

 

             2018           IVONE DOSHENAA C           Ot           O46.9

2           

ANTEPARTUM HEMORRHAGE, UNSPECIFIED, SECO                    

 

             2018           MAGALIE WISEMAN DO           Ot           Z3A.2

2           

22 WEEKS GESTATION OF PREGNANCY                    

 

             2018           JAMES DICKEY MD, Ot           Z36.

89           

ENCOUNTER FOR OTHER SPECIFIED                      

 

             2018           JAMES DICKEY MD, Ot           Z3A.

16           

16 WEEKS GESTATION OF PREGNANCY                    

 

             2018           WISEMAN DO, MAGALIE C           Ot           O46.9

2           

ANTEPARTUM HEMORRHAGE, UNSPECIFIED, SECO                    

 

             2018           WISEMAN DO, MAGALIE C           Ot           Z3A.2

2           

22 WEEKS GESTATION OF PREGNANCY                    

 

             2018           JAMES DICKEY MD, Ot           Z36.

89           

ENCOUNTER FOR OTHER SPECIFIED                      

 

             2018           JAMES DICKEY MD, Ot           Z3A.

16           

16 WEEKS GESTATION OF PREGNANCY                    

 

             2018           WISEMAN DO, MAGALIE C           Ot           O46.9

2           

ANTEPARTUM HEMORRHAGE, UNSPECIFIED, SECO                    

 

             2018           WISEMAN DO, MAGALIE C           Ot           Z3A.2

2           

22 WEEKS GESTATION OF PREGNANCY                    

 

             2018           JAMES DICKEY MD, Ot           Z36.

89           

ENCOUNTER FOR OTHER SPECIFIED                      

 

             2018           JAMES DICKEY MD, Ot           Z3A.

16           

16 WEEKS GESTATION OF PREGNANCY                    

 

             2018           WISEMAN DO, MAGALIE C           Ot           O46.9

2           

ANTEPARTUM HEMORRHAGE, UNSPECIFIED, SECO                    

 

             2018           WISEMAN DO, MAGALIE C           Ot           Z3A.2

2           

22 WEEKS GESTATION OF PREGNANCY                    

 

             2018           WISEMAN DO, MAGALIE C           Ot           D50.9

           

IRON DEFICIENCY ANEMIA, UNSPECIFIED                    

 

             2018           WISEMAN DO, MAGALIE C           Ot           D50.9

           

IRON DEFICIENCY ANEMIA, UNSPECIFIED                    

 

             2019           JAMES DICKEY MD, Ot           Z36.

89           

ENCOUNTER FOR OTHER SPECIFIED                      

 

             2019           JAMES DICKEY MD, Ot           Z3A.

16           

16 WEEKS GESTATION OF PREGNANCY                    

 

             2019           WISEMAN DO, MAGALIE C           Ot           O46.9

2           

ANTEPARTUM HEMORRHAGE, UNSPECIFIED, SECO                    

 

             2019           WISEMAN DO, MAGALIE C           Ot           Z3A.2

2           

22 WEEKS GESTATION OF PREGNANCY                    

 

             2019           WISEMAN DO, MAGALIE C           Ot           O44.4

3           

LOW LYING PLACENTA NOS OR WITHOUT HEMOR,                    

 

             2019           WISEMAN DO, MAGALIE C           Ot           O99.0

13           

ANEMIA COMPLICATING PREGNANCY, THIRD TRI                    

 

             2019           WISEMAN DO, MAGALIE C           Ot           Z3A.0

0           

WEEKS OF GESTATION OF PREGNANCY NOT SPEC                    

 

             2019           WISEMAN DO, MAGALIE C           Ot           O44.4

3           

LOW LYING PLACENTA NOS OR WITHOUT HEMOR,                    

 

             2019           WISEMAN DO, MAGALIE C           Ot           O99.0

13           

ANEMIA COMPLICATING PREGNANCY, THIRD TRI                    

 

             2019           WISEMAN DO, MAGALIE C           Ot           Z3A.0

0           

WEEKS OF GESTATION OF PREGNANCY NOT SPEC                    

 

             2019           WISEMAN DO, MAGALIE C           Ot           O44.4

3           

LOW LYING PLACENTA NOS OR WITHOUT HEMOR,                    

 

             2019           WISEMAN DO, MAGALIE C           Ot           O99.0

13           

ANEMIA COMPLICATING PREGNANCY, THIRD TRI                    

 

             2019           WISEMAN DO, MAGALIE C           Ot           Z3A.0

0           

WEEKS OF GESTATION OF PREGNANCY NOT SPEC                    

 

             2019           JAMES DICKEY MD           Ot           Z36.

89           

ENCOUNTER FOR OTHER SPECIFIED                      

 

             2019           JAMES DICKEY MD           Ot           Z3A.

16           

16 WEEKS GESTATION OF PREGNANCY                    

 

             2019           WISEMAN DO, MAGALIE C           Ot           O46.9

2           

ANTEPARTUM HEMORRHAGE, UNSPECIFIED, SECO                    

 

             2019           WISEMAN DO, MAGALIE C           Ot           Z3A.2

2           

22 WEEKS GESTATION OF PREGNANCY                    

 

             2019           WISEMAN DO, MAGALIE C           Ot           O44.4

3           

LOW LYING PLACENTA NOS OR WITHOUT HEMOR,                    

 

             2019           WISEMAN DO, MAGALIE C           Ot           O99.0

13           

ANEMIA COMPLICATING PREGNANCY, THIRD TRI                    

 

             2019           WISEMAN DO, MAGALIE C           Ot           Z3A.0

0           

WEEKS OF GESTATION OF PREGNANCY NOT SPEC                    

 

             2019           JAMES DICKEY MD           Ot           Z36.

89           

ENCOUNTER FOR OTHER SPECIFIED                      

 

             2019           JAMES DICKEY MD, Ot           Z3A.

16           

16 WEEKS GESTATION OF PREGNANCY                    

 

             2019           WISEMAN DO, MAGALIE C           Ot           O46.9

2           

ANTEPARTUM HEMORRHAGE, UNSPECIFIED, SECO                    

 

             2019           WISEMAN DO, MAGALIE C           Ot           Z3A.2

2           

22 WEEKS GESTATION OF PREGNANCY                    

 

             2019           WISEMAN DO, MAGALIE C           Ot           O44.4

3           

LOW LYING PLACENTA NOS OR WITHOUT HEMOR,                    

 

             2019           WISEMAN DO, MAGALIE C           Ot           O99.0

13           

ANEMIA COMPLICATING PREGNANCY, THIRD TRI                    

 

             2019           WISEMAN DO, MAGALIE C           Ot           Z3A.0

0           

WEEKS OF GESTATION OF PREGNANCY NOT SPEC                    

 

             2019           WISEMAN DO, MAGALIE C           Ot           D50.9

           

IRON DEFICIENCY ANEMIA, UNSPECIFIED                    

 

             2019           WISEMAN DO, MAGALIE C           Ot           O99.0

2           

ANEMIA COMPLICATING CHILDBIRTH                    

 

             2019           WISEMAN DO, MAGALIE C           Ot           Z37.0

           

SINGLE LIVE BIRTH                                

 

             2019           WISEMAN DO, MAGALIE C           Ot           Z3A.3

8           

38 WEEKS GESTATION OF PREGNANCY                    

 

             2019           JAMES DICKEY MD           Ot           Z36.

89           

ENCOUNTER FOR OTHER SPECIFIED                      

 

             2019           JAMES DICKEY MD, Ot           Z3A.

16           

16 WEEKS GESTATION OF PREGNANCY                    

 

             2019           WISEMAN DO, MAGALIE C           Ot           O46.9

2           

ANTEPARTUM HEMORRHAGE, UNSPECIFIED, SECO                    

 

             2019           WISEMAN DO, MAGALIE C           Ot           Z3A.2

2           

22 WEEKS GESTATION OF PREGNANCY                    

 

             2019           WISEMAN DO, MAGALIE C           Ot           O44.4

3           

LOW LYING PLACENTA NOS OR WITHOUT HEMOR,                    

 

             2019           WISEMAN DO, MAGALIE C           Ot           O99.0

13           

ANEMIA COMPLICATING PREGNANCY, THIRD TRI                    

 

             2019           WISEMAN DO, MAGALIE C           Ot           Z3A.0

0           

WEEKS OF GESTATION OF PREGNANCY NOT SPEC                    

 

             2019           JAMES DICKEY MD           Ot           Z36.

89           

ENCOUNTER FOR OTHER SPECIFIED                      

 

             2019           JAMES DICKEY MD, Ot           Z3A.

16           

16 WEEKS GESTATION OF PREGNANCY                    

 

             2019           WISEMAN DO, MAGALIE C           Ot           O46.9

2           

ANTEPARTUM HEMORRHAGE, UNSPECIFIED, SECO                    

 

             2019           WISEMAN DO, MAGALIE C           Ot           Z3A.2

2           

22 WEEKS GESTATION OF PREGNANCY                    

 

             2019           WISEMAN DO, MAGALIE C           Ot           O44.4

3           

LOW LYING PLACENTA NOS OR WITHOUT HEMOR,                    

 

             2019           WISEMAN DO, MAGALIE C           Ot           O99.0

13           

ANEMIA COMPLICATING PREGNANCY, THIRD TRI                    

 

             2019           WISEMAN DO, MAGALIE C           Ot           Z3A.0

0           

WEEKS OF GESTATION OF PREGNANCY NOT SPEC                    



                                                                                
                                                                                
    



Procedures

      



                Code            Description           Performed By           Per

[facility] On        

 

                                      [id number]                                 DE

[employer] 

[employer], EXTE                                               2019       

 



                  



Results

      



                    Test                Result              Range        

 

                                        CBC MORPHOLOGY - 18 16:21         

 

                    CBC MORPHOLOGY                               NORMAL        

 

                                        PERIPHERAL BLOOD SMEAR - 10/19/18 15:14 

        

 

                    PATHOLOGIST REVIEW OF PERIPHERAL SMEAR                      

         NRG        

 

                                        RETICULOCYTE COUNT - 10/19/18 15:14     

    

 

                    RETICULOCYTE COUNT, AUTOMATED           1.6 %               

NRG        

 

                    RETICULOCYTE, ABSOLUTE           76894 cells/uL           20

000-60310        

 

                                        Complete urinalysis with reflex to cultu

re - 10/31/18 17:51         

 

                    Urine color determination           YELLOW              NRG 

       

 

                    Urine clarity determination           VERY CLOUDY           

 NRG        

 

                    Urine pH measurement by test strip           5              

     5-9        

 

                    Specific gravity of urine by test strip           1.030     

          1.016-1.022  

     

 

                    Urine protein assay by test strip, semi-quantitative        

   2+                  

NEGATIVE        

 

                    Urine glucose detection by automated test strip           NE

GATIVE            

NEGATIVE        

 

                          Erythrocytes detection in urine sediment by light micr

oscopy           5+       

                                        NEGATIVE        

 

                    Urine ketones detection by automated test strip           1+

                  NEGATIVE

       

 

                    Urine nitrite detection by test strip           NEGATIVE    

        NEGATIVE    

   

 

                    Urine total bilirubin detection by test strip           NEGA

TIVE            

NEGATIVE        

 

                          Urine urobilinogen measurement by automated test strip

 (mass/volume)           1

mg/dL                                   NORMAL        

 

                    Urine leukocyte esterase detection by dipstick           2+ 

                 NEGATIVE 

      

 

                                        Automated urine sediment erythrocyte cou

nt by microscopy (number/high power 

field)                     [HPF]                    NRG        

 

                                        Automated urine sediment leukocyte count

 by microscopy (number/high power field)

                           [HPF]                    NRG        

 

                          Bacteria detection in urine sediment by light microsco

py           MODERATE     

                                        NRG        

 

                                        Squamous epithelial cells detection in u

rine sediment by light microscopy       

                          25-50                     NRG        

 

                          Crystals detection in urine sediment by light microsco

py           PRESENT      

                                        NRG        

 

                    Casts detection in urine sediment by light microscopy       

    NONE                

NRG        

 

                          Mucus detection in urine sediment by light microscopy 

          LARGE           

                                        NRG        

 

                    Complete urinalysis with reflex to culture           YES    

             NRG        

 

                                        Calcium oxalate crystals detection in ur

ine sediment by light microscopy        

                          LARGE                     NRG        

 

                                        Bacterial urine culture - 10/31/18 17:51

         

 

                    Bacterial urine culture           SEE COMMEN            NRG 

       

 

                    COLONY COUNT           .                   NRG        

 

                                        Complete blood count (CBC) with automate

d white blood cell (WBC) differential - 

10/31/18 17:59         

 

                          Blood leukocytes automated count (number/volume)      

     6.9 10*3/uL          

                                        4.3-11.0        

 

                          Blood erythrocytes automated count (number/volume)    

       3.59 10*6/uL       

                                        4.35-5.85        

 

                    Venous blood hemoglobin measurement (mass/volume)           

7.4 g/dL            

11.5-16.0        

 

                    Blood hematocrit (volume fraction)           25 %           

     35-52        

 

                    Automated erythrocyte mean corpuscular volume           71 [

foz_us]           

80-99        

 

                                        Automated erythrocyte mean corpuscular h

emoglobin (mass per erythrocyte)        

                          21 pg                     25-34        

 

                                        Automated erythrocyte mean corpuscular h

emoglobin concentration measurement 

(mass/volume)             29 g/dL                   32-36        

 

                    Automated erythrocyte distribution width ratio           19.

5 %              10.0-

14.5        

 

                    Automated blood platelet count (count/volume)           441 

10*3/uL           

130-400        

 

                          Automated blood platelet mean volume measurement      

     9.0 [foz_us]         

                                        7.4-10.4        

 

                    Automated blood neutrophils/100 leukocytes           62 %   

             42-75       

 

 

                    Automated blood lymphocytes/100 leukocytes           26 %   

             12-44       

 

 

                    Blood monocytes/100 leukocytes           8 %                

 0-12        

 

                    Automated blood eosinophils/100 leukocytes           4 %    

             0-10        

 

                    Automated blood basophils/100 leukocytes           0 %      

           0-10        

 

                    Blood neutrophils automated count (number/volume)           

4.3 10*3            

1.8-7.8        

 

                    Blood lymphocytes automated count (number/volume)           

1.8 10*3            

1.0-4.0        

 

                    Blood monocytes automated count (number/volume)           0.

5 10*3            

0.0-1.0        

 

                    Automated eosinophil count           0.3 10*3/uL           0

.0-0.3        

 

                    Automated blood basophil count (count/volume)           0.0 

10*3/uL           

0.0-0.1        

 

                                        ABO+Rh group - 10/31/18 17:59         

 

                    ABO+Rh group           OP                  NRG        

 

                                        Comprehensive metabolic panel - 10/31/18

 17:59         

 

                          Serum or plasma sodium measurement (moles/volume)     

      138 mmol/L          

                                        135-145        

 

                          Serum or plasma potassium measurement (moles/volume)  

         3.5 mmol/L       

                                        3.6-5.0        

 

                          Serum or plasma chloride measurement (moles/volume)   

        107 mmol/L        

                                                

 

                    Carbon dioxide           21 mmol/L           21-32        

 

                          Serum or plasma anion gap determination (moles/volume)

           10 mmol/L      

                                        5-14        

 

                          Serum or plasma urea nitrogen measurement (mass/volume

)           7 mg/dL       

                                        7-18        

 

                          Serum or plasma creatinine measurement (mass/volume)  

         0.66 mg/dL       

                                        0.60-1.30        

 

                    Serum or plasma urea nitrogen/creatinine mass ratio         

  11                  NRG 

       

 

                                        Serum or plasma creatinine measurement w

ith calculation of estimated glomerular 

filtration rate           >                         NRG        

 

                    Serum or plasma glucose measurement (mass/volume)           

80 mg/dL            

        

 

                    Serum or plasma calcium measurement (mass/volume)           

9.2 mg/dL           

8.5-10.1        

 

                          Serum or plasma total bilirubin measurement (mass/volu

me)           0.2 mg/dL   

                                        0.1-1.0        

 

                                        Serum or plasma alkaline phosphatase tre

surement (enzymatic activity/volume)    

                          60 U/L                            

 

                                        Serum or plasma aspartate aminotransfera

se measurement (enzymatic 

activity/volume)           16 U/L                    5-34        

 

                                        Serum or plasma alanine aminotransferase

 measurement (enzymatic activity/volume)

                          14 U/L                    0-55        

 

                    Serum or plasma protein measurement (mass/volume)           

7.5 g/dL            

6.4-8.2        

 

                    Serum or plasma albumin measurement (mass/volume)           

4.0 g/dL            

3.2-4.5        

 

                    CALCIUM CORRECTED           9.2 mg/dL           8.5-10.1    

    

 

                                        Serum or plasma choriogonadotropin measu

rement (units/volume) - 10/31/18 17:59  

       

 

                          Serum or plasma choriogonadotropin measurement (units/

volume)           60266 

m[iU]/mL                                <5        

 

                                        Serum iron and total iron binding capaci

ty panel - 10/31/18 17:59         

 

                    Serum or plasma iron measurement (mass/volume)           13 

%                  

      

 

                          Total iron binding capacity and transferrin saturation

 measurement           2 %

                                        15-50        

 

                          Iron binding capacity [mass/volume] in serum or plasma

           610 %          

                                        280-380        

 

                    UIBC (unsaturated iron binding capacity)           596 %    

                  

 

 

                    Serum or plasma ferritin measurement (mass/volume)          

 4.5 %               

20.0-177.0        

 

                                        Complete urinalysis with reflex to cultu

re - 19 08:50         

 

                    Urine color determination           YELLOW              NRG 

       

 

                    Urine clarity determination           VERY CLOUDY           

 NRG        

 

                    Urine pH measurement by test strip           5              

     5-9        

 

                    Specific gravity of urine by test strip           1.025     

          1.016-1.022  

      

 

                    Urine protein assay by test strip, semi-quantitative        

   2+                  

NEGATIVE        

 

                    Urine glucose detection by automated test strip           NE

GATIVE            

NEGATIVE        

 

                          Erythrocytes detection in urine sediment by light micr

oscopy           5+       

                                        NEGATIVE        

 

                    Urine ketones detection by automated test strip           1+

                  NEGATIVE

        

 

                    Urine nitrite detection by test strip           NEGATIVE    

        NEGATIVE    

    

 

                    Urine total bilirubin detection by test strip           1+  

                NEGATIVE  

      

 

                          Urine urobilinogen measurement by automated test strip

 (mass/volume)           

NORMAL                                  NORMAL        

 

                    Urine leukocyte esterase detection by dipstick           3+ 

                 NEGATIVE 

       

 

                                        Automated urine sediment erythrocyte cou

nt by microscopy (number/high power 

field)                     [HPF]                    NRG        

 

                                        Automated urine sediment leukocyte count

 by microscopy (number/high power field)

                          TNTC                      NRG        

 

                          Bacteria detection in urine sediment by light microsco

py           FEW          

                                        NRG        

 

                                        Squamous epithelial cells detection in u

rine sediment by light microscopy       

                          10-25                     NRG        

 

                          Crystals detection in urine sediment by light microsco

py           NONE         

                                        NRG        

 

                    Casts detection in urine sediment by light microscopy       

    NONE                

NRG        

 

                          Mucus detection in urine sediment by light microscopy 

          NEGATIVE        

                                        NRG        

 

                    Complete urinalysis with reflex to culture           YES    

             NRG        

 

                                        Bacterial urine culture - 19 08:50

         

 

                    Bacterial urine culture           3 OR MORE            NRG  

      

 

                    COLONY COUNT           30,000 CFU/ML            NRG        

 

                    FTX;REPORTABLE           GRAM POSITIVE; SUGGESTING PROBABLE 

           NRG      

  

 

                    FREE TEXT ENTRY 2           COLLECTION CONTAMINATION WITH SK

IN            NRG   

     

 

                    FREE TEXT ENTRY 3           ABRAHAM. NO SUSCEPTIBILITY PERFORM

ED.            NRG  

      

 

                                        Complete blood count (CBC) with automate

d white blood cell (WBC) differential - 

19 09:00         

 

                          Blood leukocytes automated count (number/volume)      

     8.9 10*3/uL          

                                        4.3-11.0        

 

                          Blood erythrocytes automated count (number/volume)    

       3.80 10*6/uL       

                                        4.35-5.85        

 

                    Venous blood hemoglobin measurement (mass/volume)           

10.1 g/dL           

11.5-16.0        

 

                    Blood hematocrit (volume fraction)           31 %           

     35-52        

 

                    Automated erythrocyte mean corpuscular volume           82 [

foz_us]           

80-99        

 

                                        Automated erythrocyte mean corpuscular h

emoglobin (mass per erythrocyte)        

                          27 pg                     25-34        

 

                                        Automated erythrocyte mean corpuscular h

emoglobin concentration measurement 

(mass/volume)             32 g/dL                   32-36        

 

                    Automated erythrocyte distribution width ratio           18.

5 %              10.0-

14.5        

 

                    Automated blood platelet count (count/volume)           259 

10*3/uL           

130-400        

 

                          Automated blood platelet mean volume measurement      

     9.7 [foz_us]         

                                        7.4-10.4        

 

                    Automated blood neutrophils/100 leukocytes           75 %   

             42-75       

 

 

                    Automated blood lymphocytes/100 leukocytes           15 %   

             12-44       

 

 

                    Blood monocytes/100 leukocytes           7 %                

 0-12        

 

                    Automated blood eosinophils/100 leukocytes           3 %    

             0-10        

 

                    Automated blood basophils/100 leukocytes           0 %      

           0-10        

 

                    Blood neutrophils automated count (number/volume)           

6.7 10*3            

1.8-7.8        

 

                    Blood lymphocytes automated count (number/volume)           

1.3 10*3            

1.0-4.0        

 

                    Blood monocytes automated count (number/volume)           0.

6 10*3            

0.0-1.0        

 

                    Automated eosinophil count           0.2 10*3/uL           0

.0-0.3        

 

                    Automated blood basophil count (count/volume)           0.0 

10*3/uL           

0.0-0.1        

 

                                        Blood type T Indirect antibody screen pa

pennie - 19 09:00         

 

                    ABO+Rh group           OP                  NRG        

 

                    Transfusion band number           [id number]             NRG   

     

 

                    Blood group antibody screen           NEGATIVE            NR

G        

 

                                        Complete blood count (CBC) with automate

d white blood cell (WBC) differential - 

19 05:30         

 

                          Blood leukocytes automated count (number/volume)      

     12.1 10*3/uL         

                                        4.3-11.0        

 

                          Blood erythrocytes automated count (number/volume)    

       3.83 10*6/uL       

                                        4.35-5.85        

 

                    Venous blood hemoglobin measurement (mass/volume)           

10.0 g/dL           

11.5-16.0        

 

                    Blood hematocrit (volume fraction)           32 %           

     35-52        

 

                    Automated erythrocyte mean corpuscular volume           83 [

foz_us]           

80-99        

 

                                        Automated erythrocyte mean corpuscular h

emoglobin (mass per erythrocyte)        

                          26 pg                     25-34        

 

                                        Automated erythrocyte mean corpuscular h

emoglobin concentration measurement 

(mass/volume)             32 g/dL                   32-36        

 

                    Automated erythrocyte distribution width ratio           18.

4 %              10.0-

14.5        

 

                    Automated blood platelet count (count/volume)           237 

10*3/uL           

130-400        

 

                          Automated blood platelet mean volume measurement      

     9.5 [foz_us]         

                                        7.4-10.4        

 

                    Automated blood neutrophils/100 leukocytes           86 %   

             42-75       

 

 

                    Automated blood lymphocytes/100 leukocytes           8 %    

             12-44        

 

                    Blood monocytes/100 leukocytes           5 %                

 0-12        

 

                    Automated blood eosinophils/100 leukocytes           1 %    

             0-10        

 

                    Automated blood basophils/100 leukocytes           0 %      

           0-10        

 

                    Blood neutrophils automated count (number/volume)           

10.4 10*3           

1.8-7.8        

 

                    Blood lymphocytes automated count (number/volume)           

1.0 10*3            

1.0-4.0        

 

                    Blood monocytes automated count (number/volume)           0.

6 10*3            

0.0-1.0        

 

                    Automated eosinophil count           0.1 10*3/uL           0

.0-0.3        

 

                    Automated blood basophil count (count/volume)           0.0 

10*3/uL           

0.0-0.1        

 

                                        Complete urinalysis with reflex to cultu

re - 20 11:30         

 

                    Urine color determination           YELLOW              NRG 

       

 

                    Urine clarity determination           CLEAR               NR

G        

 

                    Urine pH measurement by test strip           8.5            

     5-9        

 

                    Specific gravity of urine by test strip           1.015     

          1.016-1.022  

      

 

                    Urine protein assay by test strip, semi-quantitative        

   TRACE               

NEGATIVE        

 

                    Urine glucose detection by automated test strip           NE

GATIVE            

NEGATIVE        

 

                          Erythrocytes detection in urine sediment by light micr

oscopy           NEGATIVE 

                                        NEGATIVE        

 

                    Urine ketones detection by automated test strip           NE

GATIVE            

NEGATIVE        

 

                    Urine nitrite detection by test strip           NEGATIVE    

        NEGATIVE    

    

 

                    Urine total bilirubin detection by test strip           NEGA

TIVE            

NEGATIVE        

 

                          Urine urobilinogen measurement by automated test strip

 (mass/volume)           

1.0 mg/dL                               < = 1.0        

 

                    Urine leukocyte esterase detection by dipstick           NEG

ATIVE            

NEGATIVE        

 

                                        Automated urine sediment erythrocyte cou

nt by microscopy (number/high power 

field)                    NONE                      NRG        

 

                                        Automated urine sediment leukocyte count

 by microscopy (number/high power field)

                           [HPF]                    NRG        

 

                          Bacteria detection in urine sediment by light microsco

py           TRACE        

                                        NRG        

 

                                        Squamous epithelial cells detection in u

rine sediment by light microscopy       

                          10-25                     NRG        

 

                          Crystals detection in urine sediment by light microsco

py           NONE         

                                        NRG        

 

                    Casts detection in urine sediment by light microscopy       

    NONE                

NRG        

 

                          Mucus detection in urine sediment by light microscopy 

          SMALL           

                                        NRG        

 

                    Complete urinalysis with reflex to culture           NO     

             NRG        



                                              



Encounters

      



                ACCT No.           Visit Date/Time           Discharge          

 Status         

             Pt. Type           Provider           Facility           Loc./Unit 

          

Complaint        

 

                [id number]           2019 15:00:00           2019 23:59:

59           CLS

                Outpatient           [facility] 

[facility]                                

 

             [id number]           10/19/2018 15:35:00                              

       Document

 Registration                                                                   

 

 

             [id number]           2018 15:30:00                              

       Document

 Registration                                                                   

 

 

                    [id number]           2019 08:45:00            11:13:00        

                DIS             Inpatient           MAGALIE WISEMAN DO           

[facility]           LDRP                      LABOR        

 

                    [id number]           2019 09:36:00            23:59:59        

                CLS             Outpatient           MAGALIE WISEMAN DO          

 [facility]           RAD                       EVALUATE ANATOMY NOT SE

EN ON PRIOR 

SONO        

 

                    [id number]           2018 09:28:00            15:20:00        

                DIS             Outpatient           MAGALIE WISEMAN DO          

 [facility]                       SEVERE IRON DEF. ANEMIA

        

 

                    [id number]           2018 10:20:00            23:59:59        

                CLS             Outpatient           MAGALIE WISEMAN DO          

 [facility]           RAD                       [address] GESTATION       

 

 

                    [id number]           10/31/2018 17:38:00           10/31/2

018 21:08:00        

                DIS             Emergency           RIYA SMITH           [facility]

 [facility]           ER                        [address]W OB, VAGINAL BLEEDIN

G, LOWER R 

BACK PAIN        

 

                    [id number]           10/15/2018 14:04:00           10/15/2

018 23:59:59        

                CLS             Outpatient           NOBLE RICHARD, JAMES FLOWERS         

  [facility]           RAD                       MULTIGRAVIDA IN FIRST T

SHAD 

Z34.81        

 

             [id number]           2020 11:53:00                         

            

Document Registration

## 2020-07-18 NOTE — XMS REPORT
[facility]

                             Created on: 2020



Romy Leida

External Reference #: [id number]

: 1990

Sex: Female



Demographics





                          Address                   [address]

[place]  [address]

 

                          Preferred Language        Unknown

 

                          Marital Status            Unknown

 

                          Anabaptist Affiliation     Unknown

 

                          Race                      Unknown

 

                          Ethnic Group              Unknown





Author





                          Author                    Leida Mullins Doctor

 

                          Organization              [facility]

 

                          Address                   Unknown

 

                          Phone                     Unavailable







Care Team Providers





                    Care Team Member Name Role                Phone

 

                    Migration,  Doctor  Unavailable         Unavailable







PROBLEMS





          Type      Condition ICD9-CM Code JYF45-YW Code Onset Dates Condition S

tatus SNOMED 

Code

 

             Problem      Encounter for insertion of intrauterine contraceptive 

device              Z30.430       

                          Active                    [id number]

 

          Problem   Microcytic anemia           D50.9               Active    23

[id number]

 

          Problem   Itching             L29.9               Active    [id number]

 

          Problem   Bug bites           W57.XXXA            Active    [id number]







ALLERGIES

No Information



ENCOUNTERS





                Encounter       Location        Date            Diagnosis

 

                          [facility]  [address] [id number]

[address] [place] [address] 

[address]                              Viral upper respiratory trac

t infection J06.9

 

                          [facility]  [id number]

[address] [place] [address] 

[address]                31 Oct, 2018               

 

                    [address] [address] [address] 24 

Oct, 2018                               Prenatal care in second trimester Z34.92

 

                    [address] [address] [address] 19 

Oct, 2018                               Microcytic anemia D50.9

 

                    [place] [address] [address] 03 

Oct, 2018                               Microcytic anemia D50.9

 

                    [place] [address] [address] 26 

Sep, 2018                               Multigravida in first trimester Z34.81 a

nd Normal pregnancy in 

multigravida Z34.80

 

                    [place] [address] [address] 20 

Sep, 2018                                

 

                    [place] [address] [address] 14 

Sep, 2018                                

 

                    [place] [address]

 [place] [address] 11 

Sep, 2018                                

 

                    [place] [address] [address] 07 

Sep, 2018                               Encounter for pregnancy test Z32.00

 

                          [facility]  [address] [facility] [id number]

[address] [place] [address] 

[address]                              Irritant contact dermatitis 

due to other chemical 

products L24.5

 

                    [address] [address] [address]                                Frequent headaches R51

 

                          [facility]  [address] [facility] [id number]

[address] [place] [address] 

[address]                              Frequent headaches R51

 

                          [facility]  [facility] [address] [id number]

[address] [place] [address] 

[address]                              Other viral agents as the ca

use of diseases classified 

elsewhere B97.89 and Acute upper respiratory infection, unspecified J06.9

 

                    [place] [address] [address]                                Pyelonephritis N12

 

                    [place] [address] [address]                                Leukorrhea N89.8 ; Acute vaginitis N76.0

 and Other specified bacterial

agents as the cause of diseases classified elsewhere B96.89

 

                    [place] [address] [address] 29 

Mar, 2016                               Right ovarian cyst N83.20

 

                    [place] [address] [address]                                 

 

                    [place] [address] [address]                                 

 

                    [place] [address] [address]                                Right ovarian cyst N83.20

 

                    [address] [address] [address]                                Encounter for insertion of intrauterine 

contraceptive device Z30.430

 

                    [address] [address] [address] 07 

2016                               Encounter for counseling regarding contr

aception Z30.9

 

                    [address] [address] [address] 04 

2016                                

 

                    [place] [address] [address] 18 

Dec, 2015                               Well woman exam Z01.419 ; Weight gain R6

3.5 and BMI 27.0-27.9,adult 

Z68.27

 

                    [place] [address] [address] 18 

Dec, 2015                               Erythema nodosum L52 and Fatigue R53.83

 

                    [address] [address] 16 

Dec, 2015                               Erythema nodosum L52

 

                    [address] [address] 30 

2015                               General counseling and advice for contra

ceptive management Z30.09 and 

OCP (oral contraceptive pills) initiation Z30.011

 

                    [address] [address] 27 

2015                               Bug bites W57.XXXA and Itching L29.9

 

                    [address] [address]                                Well woman exam Z01.419 ; Weight gain R6

3.5 and BMI 27.0-27.9,adult 

Z68.27

 

                    [address] [address] 27 

Oct, 2015                               Ankle pain, left M25.572

 

                    [facility] [address] [phone] 12 

May, 2015                               Dental examination V72.2

 

                    [facility] [address] [phone] 08 

May, 2015                               Dental examination V72.2

 

                    [address] [address] 14 

2015                                

 

                    [facility] [address] [id number] [place] [address]                                 

 

                    [facility] [address] [id number] [place] [address]                                 

 

                    [facility] [address] [id number] [place] [address] ,                                 

 

                    [facility] [address] [id number] [place] [address]                                 

 

                    [facility] [address] [id number] [place] [address]                                 

 

                    [facility] [address] [id number] [place] [address]                                 

 

                    [facility] [address] [id number] [place] [address]                                 

 

                    [facility] [address] [id number] [place] [address] 09 

Oct, 2014                                

 

                    [facility] [address] [id number] [place] [address] 09 

Oct, 2014                                

 

                    [facility] [address] [id number] [place] [address] 29 

Sep, 2014                                

 

                    [facility] [address] [id number] [place] [address] 29 

Sep, 2014                                

 

                    [facility] [address] [id number] [place] [address] 22 

Sep, 2014                                

 

                    [facility] [address] [id number] [place] [address] 22 

Sep, 2014                                

 

                    [facility] [address] [id number] [place] [address] 22 

Aug, 2014                                

 

                    [facility] [address] [id number] [place] [address] 22 

Aug, 2014                                

 

                    [facility] [address] [id number] [place] [address] 19 

May, 2014                                

 

                    [facility] [address] [id number] [place] [address] 19 

May, 2014                                

 

                    [facility] [address] [id number] [place] [address] 10 

Apr, 2014                                

 

                    [facility] [address] [id number] [place] [address] 10 

Apr, 2014                                

 

                    [facility] [address] [id number] [place] [address]                                 

 

                    [facility] [address] [id number] [place] [address]                                 

 

                    [facility] [address] [id number] [place] [address]                                 

 

                    [facility] [address] [id number] [place] [address]                                 

 

                    [facility] [address] [id number] [place] [address] 10 

Feb, 2014                                

 

                    [facility] [address] [id number] [place] [address] 10 

Feb, 2014                                

 

                    [facility] [address] [id number] [place] [address]                                 

 

                    [facility] [address] [id number] [place] [address]                                 

 

                    [facility] [address] [id number] [place] [address]                                 

 

                    [facility] [address] [id number] [place] [address]                                 

 

                    [facility] [address] [id number] [place] [address]                                 

 

                    [facility] [address] [id number] [place] [address]                                 

 

                    [facility] [address] [id number] [place] [address]                                 

 

                    [facility] [address] [id number] [place] [address]                                 

 

                    [facility] [address] [facility] [id number] [place] [address]                                 

 

                    [facility] [address] [facility] [id number] [place] [address]                                 

 

                    [facility] [address] [id number] [place] [address]                                 

 

                    [facility] [address] [id number] [place] [address]                                 

 

                    [facility] [address] [facility] [id number] [place] [address] 28 

Mar, 2013                                

 

                    [facility] [address] [id number] [place] [address] 25 

Mar, 2013                                

 

                    [facility] [address] [id number] [place] [address] 07 

Mar, 2013                                

 

                    [facility] [address] [id number] [place] [address] 04 

Mar, 2013                                

 

                    [facility] [address] [id number] [place] [address] 01 

Mar, 2013                                

 

                    [facility] [address] [id number] [place] [address]                                 

 

                    [facility] [address] [id number] [place] [address]                                 

 

                    [facility] [address] [id number] [place] [address]                                 

 

                    [facility] [address] [id number] [place] [address] 25 

Oct, 2012                                

 

                    [facility] [address] [id number] [place] [address] 25 

Oct, 2012                                

 

                    [facility] [address] [id number] [place] [address] 03 

Oct, 2012                                

 

                    [facility] [address] [id number] [place] [address]                                 

 

                    [facility] [address] [id number] [place] [address]                                 







IMMUNIZATIONS

No Known Immunizations



SOCIAL HISTORY

Never Assessed



REASON FOR VISIT





PLAN OF CARE





VITAL SIGNS





MEDICATIONS

No Known Medications



RESULTS

No Results



PROCEDURES





                Procedure       Date Ordered    Result          Body Site

 

                THER/PROPH/DIAG INJ, SC/IM 2014                     

 

                Medroxyprogesterone inj 2014                     

 

                URINE PREGNANCY TEST 2014                     







INSTRUCTIONS





MEDICATIONS ADMINISTERED

No Known Medications



MEDICAL (GENERAL) HISTORY





                    Type                Description         Date

 

                    Surgical History    No know Surgical history  

 

                    Hospitalization History childbirth          

 

                    Hospitalization History childbirth          2019

## 2020-07-18 NOTE — XMS REPORT
[facility]

                             Created on: 2020



Romy Leida

External Reference #: [id number]

: 1990

Sex: Female



Demographics





                          Address                   [address] [place]  [address]

 

                          Preferred Language        Unknown

 

                          Marital Status            Unknown

 

                          Oriental orthodox Affiliation     Unknown

 

                          Race                      Unknown

 

                          Ethnic Group              Unknown





Author





                          Author                    Leida ALBA

 

                          Organization              [facility]

 

                          Address                   [address] [place]  [address]



 

                          Phone                     [phone]







Care Team Providers





                    Care Team Member Name Role                Phone

 

                    MACRINA ALBA     Unavailable         [phone]







PROBLEMS





          Type      Condition ICD9-CM Code IOA64-AQ Code Onset Dates Condition S

tatus SNOMED 

Code

 

             Problem      Encounter for insertion of intrauterine contraceptive 

device              Z30.430       

                          Active                    08961793

 

          Problem   Microcytic anemia           D50.9               Active    23

[id number]

 

          Problem   Itching             L29.9               Active    016861024

 

          Problem   Bug bites           W57.XXXA            Active    [id number]







ALLERGIES

No Information



ENCOUNTERS





                Encounter       Location        Date            Diagnosis

 

                          [facility]  [address] [id number]

[address] [place] [address] 

[id number]                              Viral upper respiratory trac

t infection J06.9

 

                          [facility]  [id number]

[address] [place] [address] 

[address]                31 Oct, 2018               

 

                    [address] [address] 24 

Oct, 2018                               Prenatal care in second trimester Z34.92

 

                    [place] [address] [address] 19 

Oct, 2018                               Microcytic anemia D50.9

 

                    [facility] [address] [address] 03 

Oct, 2018                               Microcytic anemia D50.9

 

                    [facility] [address] [address] 26 

Sep, 2018                               Multigravida in first trimester Z34.81 a

nd Normal pregnancy in 

multigravida Z34.80

 

                    [id number] [place] [address] 20 

Sep, 2018                                

 

                    [place] [address] [address] 14 

Sep, 2018                                

 

                    [facility] [address] [facility] [place] [address] 11 

Sep, 2018                                

 

                    [place] [id number] [place] [address] 07 

Sep, 2018                               Encounter for pregnancy test Z32.00

 

                          [facility]  [address] [facility] [id number]

[address], [place] 

[address]                              Irritant contact dermatitis 

due to other chemical 

products L24.5

 

                    [facility] [address] [facility] [place] [address]                                Frequent headaches R51

 

                          [facility]  [address] [facility] [id number]

[address] [place] [address] 

[address]                              Frequent headaches R51

 

                          [facility] [place] [address] [facility] [id number]

[address] [place] [address] 

[address]                              Other viral agents as the ca

use of diseases classified 

elsewhere B97.89 and Acute upper respiratory infection, unspecified J06.9

 

                    [place] [id number] [place] [address]                                Pyelonephritis N12

 

                    [place] [id number] [place] [address]                                Leukorrhea N89.8 ; Acute vaginitis N76.0

 and Other specified bacterial

agents as the cause of diseases classified elsewhere B96.89

 

                    [place] [id number] [place] [address] 29 

Mar, 2016                               Right ovarian cyst N83.20

 

                    [address] [place] [id number] [place] [address]                                 

 

                    [place] [address] [address]                                 

 

                    [place] [id number] [place] [address]                                Right ovarian cyst N83.20

 

                    [place] [address] [address] 11 

2016                               Encounter for insertion of intrauterine 

contraceptive device Z30.430

 

                    [place] [address] [address] 07 

2016                               Encounter for counseling regarding contr

aception Z30.9

 

                    [place] [address] [address] 04 

2016                                

 

                    [address] [address] 18 

Dec, 2015                               Well woman exam Z01.419 ; Weight gain R6

3.5 and BMI 27.0-27.9,adult 

Z68.27

 

                    [address] [address] 18 

Dec, 2015                               Erythema nodosum L52 and Fatigue R53.83

 

                    [address] [address] 16 

Dec, 2015                               Erythema nodosum L52

 

                    [address] [address] 30 

2015                               General counseling and advice for contra

ceptive management Z30.09 and 

OCP (oral contraceptive pills) initiation Z30.011

 

                    [address] [address] 27 

2015                               Bug bites W57.XXXA and Itching L29.9

 

                    [address] [address]                                Well woman exam Z01.419 ; Weight gain R6

3.5 and BMI 27.0-27.9,adult 

Z68.27

 

                    [address] [address] 27 

Oct, 2015                               Ankle pain, left M25.572

 

                    [facility] [address] [id number] 12 

May, 2015                               Dental examination V72.2

 

                    [facility] [address] [id number] 08 

May, 2015                               Dental examination V72.2

 

                    [facility] [address] [facility] [place] [address] 14 

Apr,                                 

 

                    [facility] [address] [id number] [place] [address]                                 

 

                    [facility] [address] [id number] [place] [address] ,                                 

 

                    [facility] [address] [id number] [place] [address] ,                                 

 

                    [facility] [address] [id number] [place] [address] ,                                 

 

                    [facility] [address] [id number] [place] [address]                                 

 

                    [facility] [address] [id number] [place] [address]                                 

 

                    [facility] [id number] [place] [address]                                 

 

                    [facility] [address] [id number] [place] [address] 09 

Oct, 2014                                

 

                    [facility] [address] [id number] [place] [address] 09 

Oct, 2014                                

 

                    [facility] [address] [id number] [place] [address] 29 

Sep, 2014                                

 

                    [facility] [id number] [place] [address] 29 

Sep, 2014                                

 

                    [facility] [address] [id number] [place] [address] 22 

Sep, 2014                                

 

                    [facility] [address] [id number] [place] [address] 22 

Sep, 2014                                

 

                    [facility] [address] [id number] [place] [address] 22 

Aug, 2014                                

 

                    [facility] [address] [id number] [place] [address] 22 

Aug, 2014                                

 

                    [facility] [address] [id number] [place] [address] 19 

May, 2014                                

 

                    [facility] [address] [id number] [place] [address] 19 

May, 2014                                

 

                    [facility] [address] [facility] [id number] [place] [address] 10 

Apr, 2014                                

 

                    [facility] [address] [id number] [place] [address] 10 

Apr, 2014                                

 

                    [facility] [address] [id number] [place] [address]                                 

 

                    [facility] [address] [facility] [id number] [place] [address]                                 

 

                    [facility] [address] [id number] [place] [address]                                 

 

                    [facility] [address] [id number] [place] [address]                                 

 

                    [facility] [address] [id number] [place] [address] 10 

Feb, 2014                                

 

                    [facility] [address] [id number] [place] [address] 10 

Feb, 2014                                

 

                    [facility] [address] [id number] [place] [address]                                 

 

                    [facility] [address] [id number] [place] [address]                                 

 

                    [facility] [address] [id number] [place] [address]                                 

 

                    [facility] [address] [facility] [id number] [place] [address]                                 

 

                    [facility] [address] [id number] [place] [address]                                 

 

                    [facility] [address] [id number] [place] [address]                                 

 

                    [facility] [address] [id number] [place] [address]                                 

 

                    [facility] [address] [id number] [place] [address]                                 

 

                    [facility] [address] [id number] [place] [address]                                 

 

                    [facility] [address] [id number] [place] [address]                                 

 

                    [facility] [address] [id number] [place] [address]                                 

 

                    [facility] [address] [id number] [place] [address]                                 

 

                    [facility] [address] [id number] [place] [address] 28 

Mar, 2013                                

 

                    [facility] [address] [id number] [place] [address] 25 

Mar, 2013                                

 

                    [facility] [id number] [place] [address] 07 

Mar, 2013                                

 

                    [facility] [address] [id number] [place] [address] 04 

Mar, 2013                                

 

                    [facility] [address] [id number] [place] [address] 01 

Mar, 2013                                

 

                    [facility] [address] [id number] [place] [address]                                 

 

                    [facility] [address] [id number] [place] [address]                                 

 

                    [facility] [address] [id number] [place] [address]                                 

 

                    [facility] [address] [id number] [place] [address] 25 

Oct, 2012                                

 

                    [facility] [address] [id number] [place] [address] 25 

Oct, 2012                                

 

                    [facility] [address] [id number] [place] [address] 03 

Oct, 2012                                

 

                    [facility] [address] [id number] [place] [address]                                 

 

                    [facility] [address] [facility] [id number] [place] [address]                                 







IMMUNIZATIONS

No Known Immunizations



SOCIAL HISTORY

Never Assessed



REASON FOR VISIT





PLAN OF CARE





VITAL SIGNS





                    Height              62 in               2014-04-10

 

                    Weight              148 lbs             2014-04-10

 

                    Temperature         97.9 degrees Fahrenheit 2014-04-10

 

                    Heart Rate          78 bpm              2014-04-10

 

                    Respiratory Rate    20                  2014-04-10

 

                    Blood pressure systolic 122 mmHg            2014-04-10

 

                    Blood pressure diastolic 70 mmHg             2014-04-10







MEDICATIONS

No Known Medications



RESULTS

No Results



PROCEDURES

No Known procedures



INSTRUCTIONS





MEDICATIONS ADMINISTERED

No Known Medications



MEDICAL (GENERAL) HISTORY





                    Type                Description         Date

 

                    Surgical History    No know Surgical history  

 

                    Hospitalization History childbirth          

 

                    Hospitalization History childbirth

## 2020-07-18 NOTE — XMS REPORT
[facility]

                             Created on: 2019



RomyLeida

External Reference #: [id number]

: 1990

Sex: Female



Demographics





                          Address                   [address] [place]  [address]

 

                          Preferred Language        Unknown

 

                          Marital Status            Unknown

 

                          Hinduism Affiliation     Unknown

 

                          Race                      Unknown

 

                          Ethnic Group              Unknown





Author





                          Author                    Leida Mullins Doctor

 

                          Organization              [facility]

 

                          Address                   Unknown

 

                          Phone                     Unavailable







Care Team Providers





                    Care Team Member Name Role                Phone

 

                    Migration,  Doctor  Unavailable         Unavailable







PROBLEMS





          Type      Condition ICD9-CM Code YRL41-EH Code Onset Dates Condition S

tatus SNOMED 

Code

 

             Problem      Encounter for insertion of intrauterine contraceptive 

device              Z30.430       

                          Active                    [id number]

 

          Problem   Microcytic anemia           D50.9               Active    23

[id number]

 

          Problem   Itching             L29.9               Active    [id number]

 

          Problem   Bug bites           W57.XXXA            Active    [id number]







ALLERGIES

No Information



ENCOUNTERS





                Encounter       Location        Date            Diagnosis

 

                          [facility]  [address] [address] [id number]

[address] [place] [address] 

[id number]                31 Oct, 2018               

 

                          [facility], [place]     [address] [address] [id number]

[address] [place] [address]

                          24 Oct, 2018              Prenatal care in second trim

orville Z34.92

 

                          [facility], [place]     [address] [facility] [id number]

[address] [place] [address]

                          19 Oct, 2018              Microcytic anemia D50.9

 

                          [facility] [place]     [address] [facility] [id number]

[address] [place] [address]

                          03 Oct, 2018              Microcytic anemia D50.9

 

                          [facility] [place]     [address] [facility] [id number]

[address] [place] [address]

                          26 Sep, 2018              Multigravida in first trimes

ter Z34.81 and Normal pregnancy in 

multigravida Z34.80

 

                          [facility] [place]     [address] [facility] [id number]

[address] [place] [address]

                          20 Sep, 2018               

 

                          [facility] [place]     [address] [facility] [id number]

[address] [place] [address]

                          14 Sep, 2018               

 

                          [facility] [place]     [address] [facility] [id number]

[address] [place] [address]

                          11 Sep, 2018               

 

                          [facility] [place]     [address] [facility] [id number]

[address] [place] [address]

                          07 Sep, 2018              Encounter for pregnancy test

 Z32.00

 

                          [facility]  [address] [facility] [id number]

[address] [place] [address] 

[address]                              Irritant contact dermatitis 

due to other chemical 

products L24.5

 

                          [place] [address] [facility] [id number]

[address] [place] [address]

                                        Frequent headaches R51

 

                          [facility]  [address] [facility] [id number]

[address] [place] [address] 

[address]                              Frequent headaches R51

 

                          [facility]  [facility] [address] [facility] [id number]

[address] [place] [address] 

[address]                              Other viral agents as the ca

use of diseases classified 

elsewhere B97.89 and Acute upper respiratory infection, unspecified J06.9

 

                          [place] [address] [place] [id number]

[address] [place] [address]

                                        Pyelonephritis N12

 

                          [place] [address] [facility] [id number]

[address] [place] [address]

                                        Leukorrhea N89.8 ; Acute vag

initis N76.0 and Other specified 

bacterial agents as the cause of diseases classified elsewhere B96.89

 

                          [place] [address] [facility] [id number]

[address] [place] [address]

                          29 Mar, 2016              Right ovarian cyst N83.20

 

                          [place] [address] [place] [id number]

[address] [place] [address]

                                         

 

                          [place] [address] [facility] [id number]

[address] [place] [address]

                                         

 

                          [place] [address] [place] [id number]

[address] [place] [address]

                                        Right ovarian cyst N83.20

 

                          [place] [address] [place] [id number]

[address] [place] [address]

                                        Encounter for insertion of i

ntrauterine contraceptive device 

Z30.430

 

                          [place] [address]

[address] [place] [address]

                          07 2016              Encounter for counseling reg

arding contraception Z30.9

 

                          [place] [address] [address]

                          04 2016               

 

                          [place] [address] [address]

                          18 Dec, 2015              Well woman exam Z01.419 ; We

ight gain R63.5 and BMI 27.0-27.9,adult

Z68.27

 

                          [place] [address] [address]

                          18 Dec, 2015              Erythema nodosum L52 and Fat

igue R53.83

 

                          [place] [address] [address]

                          16 Dec, 2015              Erythema nodosum L52

 

                          [place] [address] [address]

                          30 2015              General counseling and advic

e for contraceptive management Z30.09 

and OCP (oral contraceptive pills) initiation Z30.011

 

                          [address] [address]

                          27 2015              Bug bites W57.XXXA and Itchi

ng L29.9

 

                          [place] [id number] [place] [address]

                          19 2015              Well woman exam Z01.419 ; We

ight gain R63.5 and BMI 27.0-27.9,adult

Z68.27

 

                          [place] [address]

[address] [place] [address]

                          27 Oct, 2015              Ankle pain, left M25.572

 

                          [facility]   [address]

[address] [place] [id number]

                          12 May, 2015              Dental examination V72.2

 

                          [facility]   [address] [id number]

[address] [place] [id number]

                          08 May, 2015              Dental examination V72.2

 

                          [facility]     [address] [id number]

[address] [place] [address], [place] [address]

                          14 2015               

 

                          [facility]     [address] [id number]

[address] [place] [address], [place] [address]

                                         

 

                          [facility]     [address] [id number]

[address] [place] [address], [place] [address]

                          ,                

 

                          [facility]     [address] [id number]

[address] [place] [address], [place] [address]

                          ,                

 

                          [facility]     [address] [id number]

[address] [place] [address], [place] [address]

                          ,                

 

                          [facility]     [address] [id number]

[address] [place] [address], [place] [address]

                          ,                

 

                          [facility]     [address] [id number]

[address] [place] [address], [place] [address]

                                         

 

                          [facility]     [address] [id number]

[address] [place] [address]

                                         

 

                          [facility]     [address] [id number]

[address] [place] [address], [place] [address]

                          09 Oct, 2014               

 

                          [facility]     [address] [id number]

[address] [place] [address]

                          09 Oct, 2014               

 

                          [facility]     [address] [id number]

[address] [place] [address]

                          29 Sep, 2014               

 

                          [facility]     [address] [id number]

[address] [place] [address]

                          29 Sep, 2014               

 

                          [facility]     [address] [id number]

[address] [place] [address], [place] [address]

                          22 Sep, 2014               

 

                          [facility]     [address] [id number]

[address] [place] [address], [place] [address]

                          22 Sep, 2014               

 

                          [facility]     [address] [id number]

[address] [place] [address], [place] [address]

                          22 Aug, 2014               

 

                          [facility]     [address] [id number]

[address] [place] [address]

                          22 Aug, 2014               

 

                          [facility]     [address] [id number]

[address] [place] [address], [place] [address]

                          19 May, 2014               

 

                          [facility]     [address] [id number]

[address] [place] [address], [place] [address]

                          19 May, 2014               

 

                          [facility]     [address] [id number]

[address] [place] [address], [place] [address]

                          10 Apr, 2014               

 

                          [facility]     [address] [id number]

[address] [place] [address], [place] [address]

                          10 Apr, 2014               

 

                          [facility]     [address] [id number]

[address] [place] [address], [place] [address]

                                         

 

                          [facility]     [address] [id number]

[address] [place] [address], [place] [address]

                                         

 

                          [facility]     [address] [id number]

[address] [place] [address], [place] [address]

                                         

 

                          [facility]     [address] [id number]

[address] [place] [address], [place] [address]

                                         

 

                          [facility]     [address] [id number]

[address] [place] [address], [place] [address]

                          10 Feb, 2014               

 

                          [facility]     [address] [id number]

[address] [place] [address], [place] [address]

                          10 Feb, 2014               

 

                          [facility]     [address] [id number]

[address] [place] [address], [place] [address]

                                         

 

                          [facility]     [address] [id number]

[address] [place] [address], [place] [address]

                                         

 

                          [facility]     [address] [id number]

[address] [place] [address], [place] [address]

                                         

 

                          [facility]     [address] [id number]

[address] [place] [address], [place] [address]

                                         

 

                          [facility]     [address] [id number]

[address] [place] [address], [place] [address]

                                         

 

                          [facility]     [address] [id number]

[address] [place] [address], [place] [address]

                                         

 

                          [facility]     [address] [id number]

[address] [place] [address], [place] [address]

                                         

 

                          [facility]     [address] [id number]

[address] [place] [address], [place] [address]

                                         

 

                          [facility]     [address] [id number]

[address] [place] [address], [place] [address]

                                         

 

                          [facility]     [address] [id number]

[address] [place] [address], [place] [address]

                                         

 

                          [facility]     [address] [id number]

[address] [place] [address], [place] [address]

                                         

 

                          [facility]     [address] [id number]

[address] [place] [address], [place] [address]

                                         

 

                          [facility]     [address] [id number]

[address] [place] [address], [place] [address]

                          28 Mar, 2013               

 

                          [facility]     [address] [id number]

[address] [place] [address], [place] [address]

                          25 Mar, 2013               

 

                          [facility]     [address] [id number]

[address] [place] [address], [place] [address]

                          07 Mar, 2013               

 

                          [facility]     [address] [id number]

[address] [place] [address], [place] [address]

                          04 Mar, 2013               

 

                          [facility]     [address] [id number]

[address] [place] [address], [place] [address]

                          01 Mar, 2013               

 

                          [facility]     [address] [id number]

[address] [place] [address], [place] [address]

                                         

 

                          [facility]     [address] [id number]

[address] [place] [address], [place] [address]

                                         

 

                          [facility]     [address] [id number]

[address] [place] [address], [place] [address]

                                         

 

                          [facility]     [address] [id number]

[address] [place] [address]

                          25 Oct, 2012               

 

                          [facility] [place]     [address] [facility] [id number]

[address] [place] [address]

                          25 Oct, 2012               

 

                          [facility] [place]     [address] [facility] [id number]

[address] [place] [address]

                          03 Oct, 2012               

 

                          [facility] [place]     [address] [facility] [id number]

[address] [place] [address]

                                         

 

                          [facility] [place]     [address] [facility] [id number]

[address] [place] [address]

                                         







IMMUNIZATIONS

No Known Immunizations



SOCIAL HISTORY

Never Assessed



[place]IT

[facility] [place]





VITAL SIGNS





MEDICATIONS

Unknown Medications



RESULTS

No Results



PROCEDURES

No Known procedures



INSTRUCTIONS





MEDICATIONS ADMINISTERED

No Known Medications



MEDICAL (GENERAL) HISTORY





                    Type                Description         Date

 

                    Surgical History    No know Surgical history  

 

                    Hospitalization History childbirth

## 2020-07-18 NOTE — XMS REPORT
[facility]

                             Created on: 2020



Romy Leida

External Reference #: [id number]

: 1990

Sex: Female



Demographics





                          Address                   [address]

[place]  [address]

 

                          Preferred Language        Unknown

 

                          Marital Status            Unknown

 

                          Gnosticist Affiliation     Unknown

 

                          Race                      Unknown

 

                          Ethnic Group              Unknown





Author





                          Author                    Leida Mullins Doctor

 

                          Organization              [facility]

 

                          Address                   Unknown

 

                          Phone                     Unavailable







Care Team Providers





                    Care Team Member Name Role                Phone

 

                    Migration,  Doctor  Unavailable         Unavailable







PROBLEMS





          Type      Condition ICD9-CM Code HWH12-FE Code Onset Dates Condition S

tatus SNOMED 

Code

 

             Problem      Encounter for insertion of intrauterine contraceptive 

device              Z30.430       

                          Active                    [id number]

 

          Problem   Microcytic anemia           D50.9               Active    23

[id number]

 

          Problem   Itching             L29.9               Active    [id number]

 

          Problem   Bug bites           W57.XXXA            Active    [id number]







ALLERGIES

No Information



ENCOUNTERS





                Encounter       Location        Date            Diagnosis

 

                          [facility]  [address] [facility] [id number]

[address] [place] [address] 

[id number]                              Viral upper respiratory trac

t infection J06.9

 

                          [facility]  [address] [facility] [id number]

[address] [place] [address] 

[phone]                31 Oct, 2018               

 

                          [facility]     [address] [facility] [id number]

[address] [place] [address]

                          24 Oct, 2018              Prenatal care in second trim

orville Z34.92

 

                          [facility]     [address] [facility] [id number]

[address] [place] [address]

                          19 Oct, 2018              Microcytic anemia D50.9

 

                          [facility]     [address] [place] [id number]

[address] [place] [phone]

                          03 Oct, 2018              Microcytic anemia D50.9

 

                          [facility]     [address] [facility] [id number]

[address] [place] [address]

                          26 Sep, 2018              Multigravida in first trimes

ter Z34.81 and Normal pregnancy in 

multigravida Z34.80

 

                          [facility]     [address] [facility] [id number]

[address] [place] [address]

                          20 Sep, 2018               

 

                          [facility]     [address] [facility] [id number]

[address] [place] [address]

                          14 Sep, 2018               

 

                          [facility]     [address] [id number]

[address] [place] [id number]

                          11 Sep, 2018               

 

                          [facility]     [address] [id number]

[address] [place] [phone]

                          07 Sep, 2018              Encounter for pregnancy test

 Z32.00

 

                          [facility]  [address] [facility] [id number]

[address] [place] [address] 

[address]                              Irritant contact dermatitis 

due to other chemical 

products L24.5

 

                          [facility]     [address] [id number]

[address] [place] [address]

                                        Frequent headaches R51

 

                          [facility]  [facility] [address] [id number]

[address] [place] [address] 

[address]                              Frequent headaches R51

 

                          [facility] [place] [address] [facility] [id number]

[address] [place] [address] 

[address]                              Other viral agents as the ca

use of diseases classified 

elsewhere B97.89 and Acute upper respiratory infection, unspecified J06.9

 

                          [place] [address] [id number]

[address] [place] [address]

                                        Pyelonephritis N12

 

                          [place] [address] [facility] [id number]

[address] [place] [address]

                                        Leukorrhea N89.8 ; Acute vag

initis N76.0 and Other specified 

bacterial agents as the cause of diseases classified elsewhere B96.89

 

                          [place] [address] [id number]

[address] [place] [address]

                          29 Mar, 2016              Right ovarian cyst N83.20

 

                          [place] [address] [id number]

[address] [place] [address]

                                         

 

                          [place] [address] [facility] [id number]

[address] [place] [address]

                                         

 

                          [place] [address] [facility] [id number]

[address] [place] [address]

                                        Right ovarian cyst N83.20

 

                          [place] [address] [address]

                          11 2016              Encounter for insertion of i

ntrauterine contraceptive device 

Z30.430

 

                          [place] [address] [address]

                          07 2016              Encounter for counseling reg

arding contraception Z30.9

 

                          [place] [address] [address]

                          04 2016               

 

                          [place] [address] [address]

                          18 Dec, 2015              Well woman exam Z01.419 ; We

ight gain R63.5 and BMI 27.0-27.9,adult

Z68.27

 

                          [address] [address]

                          18 Dec, 2015              Erythema nodosum L52 and Fat

igue R53.83

 

                          [address] [address]

                          16 Dec, 2015              Erythema nodosum L52

 

                          [address] [address]

                          30 2015              General counseling and advic

e for contraceptive management Z30.09 

and OCP (oral contraceptive pills) initiation Z30.011

 

                          [address] [address]

                          27 2015              Bug bites W57.XXXA and Itchi

ng L29.9

 

                          [address] [address]

                          19 2015              Well woman exam Z01.419 ; We

ight gain R63.5 and BMI 27.0-27.9,adult

Z68.27

 

                          [place] [address] [address]

                          27 Oct, 2015              Ankle pain, left M25.572

 

                          [facility]   [address] [address] [id number]

[address] [place] [id number]

                          12 May, 2015              Dental examination V72.2

 

                          [facility]   [address] [id number]

[address] [place] [id number]

                          08 May, 2015              Dental examination V72.2

 

                          [facility]     [address] [id number]

[address] [place] [address]

                                         

 

                          [facility]     [address] [id number]

[address] [place] [address], [place] [address]

                                         

 

                          [facility]     [address] [id number]

[address] [place] [address]

                                         

 

                          [facility]     [address] [id number]

[address] [place] [address], [place] [address]

                                         

 

                          [facility]     [address] [id number]

[address] [place] [address]

                                         

 

                          [facility]     [address] [id number]

[address] [place] [phone]

                                         

 

                          [facility]     [address] [id number]

[address] [place] [phone]

                                         

 

                          [facility]     [address] [id number]

[address] [place] [address]

                                         

 

                          [facility]     [address] [id number]

[address] [place] [phone]

                          09 Oct, 2014               

 

                          [facility]     [address] [id number]

[address] [place] [address]

                          09 Oct, 2014               

 

                          [facility]     [address] [id number]

[address] [place] [address]

                          29 Sep, 2014               

 

                          [facility]     [address] [id number]

[address] [place] [phone]

                          29 Sep, 2014               

 

                          [facility]     [address] [id number]

[address] [place] [address], [place] [address]

                          22 Sep, 2014               

 

                          [facility]     [address] [id number]

[address] [place] [phone]

                          22 Sep, 2014               

 

                          [facility]     [address] [id number]

[address] [place] [address], [place] [address]

                          22 Aug, 2014               

 

                          [facility]     [address] [id number]

[address] [place] [address], [place] [address]

                          22 Aug, 2014               

 

                          [facility]     [address] [id number]

[address] [place] [address], [place] [address]

                          19 May, 2014               

 

                          [facility]     [address] [id number]

[address] [place] [address], [place] [address]

                          19 May, 2014               

 

                          [facility]     [address] [id number]

[address] [place] [address], [place] [address]

                          10 Apr, 2014               

 

                          [facility]     [address] [id number]

[address] [place] [address], [place] [address]

                          10 Apr, 2014               

 

                          [facility]     [address] [id number]

[address] [place] [address], [place] [address]

                                         

 

                          [facility]     [address] [id number]

[address] [place] [address], [place] [address]

                                         

 

                          [facility]     [address] [id number]

[address] [place] [address], [place] [address]

                                         

 

                          [facility]     [address] [id number]

[address] [place] [address], [place] [address]

                                         

 

                          [facility]     [address] [id number]

[address] [place] [address], [place] [address]

                          10 Feb, 2014               

 

                          [facility]     [address] [id number]

[address] [place] [address], [place] [address]

                          10 Feb, 2014               

 

                          [facility]     [address] [id number]

[address] [place] [address], [place] [address]

                                         

 

                          [facility]     [address] [id number]

[address] [place] [address], [place] [address]

                                         

 

                          [facility]     [address] [id number]

[address] [place] [address], [place] [address]

                                         

 

                          [facility]     [address] [id number]

[address] [place] [address], [place] [address]

                                         

 

                          [facility]     [address] [id number]

[address] [place] [address], [place] [address]

                                         

 

                          [facility]     [address] [id number]

[address] [place] [address], [place] [address]

                                         

 

                          [facility]     [address] [id number]

[address] [place] [address], [place] [address]

                                         

 

                          [facility]     [address] [id number]

[address] [place] [address], [place] [address]

                                         

 

                          [facility]     [address] [id number]

[address] [place] [address], [place] [address]

                                         

 

                          [facility]     [address] [id number]

[address] [place] [address], [place] [address]

                                         

 

                          [facility]     [address] [id number]

[address] [place] [address], [place] [address]

                                         

 

                          [facility]     [address] [id number]

[address] [place] [address], [place] [address]

                                         

 

                          [facility]     [address] [id number]

[address] [place] [address], [place] [address]

                          28 Mar, 2013               

 

                          [facility]     [address] [id number]

[address] [place] [address], [place] [address]

                          25 Mar, 2013               

 

                          [facility]     [address] [id number]

[address] [place] [address], [place] [address]

                          07 Mar, 2013               

 

                          [facility]     [address] [id number]

[address] [place] [address], [place] [address]

                          04 Mar, 2013               

 

                          [facility]     [address] [id number]

[address] [place] [address], [place] [address]

                          01 Mar, 2013               

 

                          [facility]     [address] [id number]

[address] [place] [address], [place] [address]

                                         

 

                          [facility]     [address] [id number]

[address] [place] [address], [place] [address]

                                         

 

                          [facility]     [address] [facility] [id number]

[address] [place] [address]

                                         

 

                          [facility]     [address] [facility] [id number]

[address] [place] [address]

                          25 Oct, 2012               

 

                          [facility]     [address] [facility] [id number]

[address] [place] [address]

                          25 Oct, 2012               

 

                          [facility]     [address] [facility] [id number]

[address] [place] [address]

                          03 Oct, 2012               

 

                          [facility]     [address] [facility] [id number]

[address] [place] [address]

                                         

 

                          [facility]     [address] [facility] [id number]

[address] [place] [address]

                                         







IMMUNIZATIONS

No Known Immunizations



SOCIAL HISTORY

Never Assessed



REASON FOR VISIT





PLAN OF CARE





VITAL SIGNS





MEDICATIONS

No Known Medications



RESULTS

No Results



PROCEDURES





                Procedure       Date Ordered    Result          Body Site

 

                Medroxyprogesterone inj 2013                    

 

                URINE PREGNANCY TEST 2013                    







INSTRUCTIONS





MEDICATIONS ADMINISTERED

No Known Medications



MEDICAL (GENERAL) HISTORY





                    Type                Description         Date

 

                    Surgical History    No know Surgical history  

 

                    Hospitalization History childbirth          

 

                    Hospitalization History childbirth          2019

## 2020-07-18 NOTE — XMS REPORT
[facility]

                             Created on: 05/10/2020



Romy Leida

External Reference #: [id number]

: 1990

Sex: Female



Demographics





                          Address                   [address]

[place]  [address]

 

                          Preferred Language        Unknown

 

                          Marital Status            Unknown

 

                          Shinto Affiliation     Unknown

 

                          Race                      Unknown

 

                          Ethnic Group              Unknown





Author





                          Author                    Leida Mullins Doctor

 

                          Organization              [facility]

 

                          Address                   Unknown

 

                          Phone                     Unavailable







Care Team Providers





                    Care Team Member Name Role                Phone

 

                    Migration,  Doctor  Unavailable         Unavailable







PROBLEMS





          Type      Condition ICD9-CM Code RMP55-OU Code Onset Dates Condition S

tatus SNOMED 

Code

 

             Problem      Encounter for insertion of intrauterine contraceptive 

device              Z30.430       

                          Active                    33068512

 

          Problem   Microcytic anemia           D50.9               Active    23

[id number]

 

          Problem   Itching             L29.9               Active    882313590

 

          Problem   Bug bites           W57.XXXA            Active    [id number]







ALLERGIES

No Information



ENCOUNTERS





                Encounter       Location        Date            Diagnosis

 

                          [facility]  [address] [id number]

[address] [place] [address] 

[id number]                              Viral upper respiratory trac

t infection J06.9

 

                          [facility]  [address] [id number]

[address] [place] [address] 

[phone]                31 Oct, 2018               

 

                          [facility]     [address] [id number]

[address] [place] [address]

                          24 Oct, 2018              Prenatal care in second trim

orivlle Z34.92

 

                          [facility]     [address] [id number]

[address] [place] [address]

                          19 Oct, 2018              Microcytic anemia D50.9

 

                          [facility]     [address] [place] [id number]

[address] [place] [address]

                          03 Oct, 2018              Microcytic anemia D50.9

 

                          [facility]     [address] [id number]

[address] [place] [address]

                          26 Sep, 2018              Multigravida in first trimes

ter Z34.81 and Normal pregnancy in 

multigravida Z34.80

 

                          [facility]     [address] [id number]

[address] [place] [address]

                          20 Sep, 2018               

 

                          [facility]     [address] [id number]

[address] [place] [address]

                          14 Sep, 2018               

 

                          [facility]     [address] [id number]

[address] [place] [address]

                          11 Sep, 2018               

 

                          [facility]     [address] [id number]

[address] [place] [address]

                          07 Sep, 2018              Encounter for pregnancy test

 Z32.00

 

                          [facility]  [address] [id number]

[address] [place] [address] 

[address]                              Irritant contact dermatitis 

due to other chemical 

products L24.5

 

                          [facility]     [address] [id number]

[address] [place] [address]

                                        Frequent headaches R51

 

                          [facility]  [facility] [address] [id number]

[address] [place] [address] 

[address]                              Frequent headaches R51

 

                          [facility] [place] [address] [id number]

[address] [place] [address] 

[address]                              Other viral agents as the ca

use of diseases classified 

elsewhere B97.89 and Acute upper respiratory infection, unspecified J06.9

 

                          [place] [address] [id number]

[address] [place] [address]

                                        Pyelonephritis N12

 

                          [place] [address] [id number]

[address] [place] [address]

                                        Leukorrhea N89.8 ; Acute vag

initis N76.0 and Other specified 

bacterial agents as the cause of diseases classified elsewhere B96.89

 

                          [place] [address] [id number]

[address] [place] [address]

                          29 Mar, 2016              Right ovarian cyst N83.20

 

                          [place] [address] [id number]

[address] [place] [address]

                                         

 

                          [place] [address] [id number]

[address] [place] [address]

                                         

 

                          [place] [address] [id number]

[address] [place] [address]

                                        Right ovarian cyst N83.20

 

                          [place] [address] [address]

                          11 2016              Encounter for insertion of i

ntrauterine contraceptive device 

Z30.430

 

                          [place] [address] [address]

                          07 2016              Encounter for counseling reg

arding contraception Z30.9

 

                          [place] [address] [address]

                          04 2016               

 

                          [place] [address] [address]

                          18 Dec, 2015              Well woman exam Z01.419 ; We

ight gain R63.5 and BMI 27.0-27.9,adult

Z68.27

 

                          [address] [address]

                          18 Dec, 2015              Erythema nodosum L52 and Fat

igue R53.83

 

                          [address] [address]

                          16 Dec, 2015              Erythema nodosum L52

 

                          [address] [address]

                          30 2015              General counseling and advic

e for contraceptive management Z30.09 

and OCP (oral contraceptive pills) initiation Z30.011

 

                          [address] [address]

                          27 2015              Bug bites W57.XXXA and Itchi

ng L29.9

 

                          [address] [address]

                          19 2015              Well woman exam Z01.419 ; We

ight gain R63.5 and BMI 27.0-27.9,adult

Z68.27

 

                          [place] [address] [address]

                          27 Oct, 2015              Ankle pain, left M25.572

 

                          [facility]   [address] [place] [id number]

[address] [place] [id number]

                          12 May, 2015              Dental examination V72.2

 

                          [facility]   [address] [id number]

[address] [place] [id number]

                          08 May, 2015              Dental examination V72.2

 

                          [facility]     [address] [id number]

[address] [place] [address]

                                         

 

                          [facility]     [address] [id number]

[address] [place] [address], [place] [address]

                                         

 

                          [facility]     [address] [id number]

[address] [place] [address]

                                         

 

                          [facility]     [address] [id number]

[address] [place] [address], [place] [address]

                                         

 

                          [facility]     [address] [id number]

[address] [place] [address]

                                         

 

                          [facility]     [address] [id number]

[address] [place] [address]

                                         

 

                          [facility]     [address] [id number]

[address] [place] [address]

                                         

 

                          [facility]     [address] [id number]

[address] [place] [address]

                                         

 

                          [facility]     [address] [id number]

[address] [place] [address]

                          09 Oct, 2014               

 

                          [facility]     [address] [id number]

[address] [place] [address]

                          09 Oct, 2014               

 

                          [facility]     [address] [id number]

[address] [place] [address]

                          29 Sep, 2014               

 

                          [facility]     [address] [id number]

[address] [place] [address]

                          29 Sep, 2014               

 

                          [facility]     [address] [id number]

[address] [place] [address], [place] [address]

                          22 Sep, 2014               

 

                          [facility]     [address] [id number]

[address] [place] [phone]

                          22 Sep, 2014               

 

                          [facility]     [address] [id number]

[address] [place] [address], [place] [address]

                          22 Aug, 2014               

 

                          [facility]     [address] [id number]

[address] [place] [address], [place] [address]

                          22 Aug, 2014               

 

                          [facility]     [address] [id number]

[address] [place] [address], [place] [address]

                          19 May, 2014               

 

                          [facility]     [address] [id number]

[address] [place] [address], [place] [address]

                          19 May, 2014               

 

                          [facility]     [address] [id number]

[address] [place] [address], [place] [address]

                          10 Apr, 2014               

 

                          [facility]     [address] [id number]

[address] [place] [address], [place] [address]

                          10 Apr, 2014               

 

                          [facility]     [address] [id number]

[address] [place] [address], [place] [address]

                                         

 

                          [facility]     [address] [id number]

[address] [place] [address], [place] [address]

                                         

 

                          [facility]     [address] [id number]

[address] [place] [address], [place] [address]

                                         

 

                          [facility]     [address] [id number]

[address] [place] [address], [place] [address]

                                         

 

                          [facility]     [address] [id number]

[address] [place] [address], [place] [address]

                          10 Feb, 2014               

 

                          [facility]     [address] [id number]

[address] [place] [address], [place] [address]

                          10 Feb, 2014               

 

                          [facility]     [address] [id number]

[address] [place] [address], [place] [address]

                                         

 

                          [facility]     [address] [id number]

[address] [place] [address], [place] [address]

                                         

 

                          [facility]     [address] [id number]

[address] [place] [address], [place] [address]

                                         

 

                          [facility]     [address] [id number]

[address] [place] [address], [place] [address]

                                         

 

                          [facility]     [address] [id number]

[address] [place] [address], [place] [address]

                                         

 

                          [facility]     [address] [id number]

[address] [place] [address], [place] [address]

                                         

 

                          [facility]     [address] [id number]

[address] [place] [address], [place] [address]

                                         

 

                          [facility]     [address] [id number]

[address] [place] [address], [place] [address]

                                         

 

                          [facility]     [address] [id number]

[address] [place] [address], [place] [address]

                                         

 

                          [facility]     [address] [id number]

[address] [place] [address], [place] [address]

                                         

 

                          [facility]     [address] [id number]

[address] [place] [address], [place] [address]

                                         

 

                          [facility]     [address] [id number]

[address] [place] [address], [place] [address]

                                         

 

                          [facility]     [address] [id number]

[address] [place] [address], [place] [address]

                          28 Mar, 2013               

 

                          [facility]     [address] [id number]

[address] [place] [address], [place] [address]

                          25 Mar, 2013               

 

                          [facility]     [address] [id number]

[address] [place] [address], [place] [address]

                          07 Mar, 2013               

 

                          [facility]     [address] [id number]

[address] [place] [address], [place] [address]

                          04 Mar, 2013               

 

                          [facility]     [address] [id number]

[address] [place] [address], [place] [address]

                          01 Mar, 2013               

 

                          [facility]     [address] [id number]

[address] [place] [address], [place] [address]

                                         

 

                          [facility]     [address] [id number]

[address] [place] [address], [place] [address]

                                         

 

                          [facility]     [address] [id number]

[address] [place] [address]

                                         

 

                          [facility]     [address] [id number]

[address] [place] [address]

                          25 Oct, 2012               

 

                          [facility]     [address] [id number]

[address] [place] [address]

                          25 Oct, 2012               

 

                          [facility]     [address] [id number]

[address] [place] [address]

                          03 Oct, 2012               

 

                          [facility]     [address] [address] [id number]

[address] [place] [address]

                                         

 

                          [facility]     [address] [id number]

[address] [place] [address]

                                         







IMMUNIZATIONS

No Known Immunizations



SOCIAL HISTORY

Never Assessed



REASON FOR VISIT





PLAN OF CARE





VITAL SIGNS





MEDICATIONS

No Known Medications



RESULTS

No Results



PROCEDURES





                Procedure       Date Ordered    Result          Body Site

 

                THER/PROPH/DIAG INJ, SC/IM 2013                     

 

                Medroxyprogesterone inj 2013                     

 

                URINE PREGNANCY TEST 2013                     







INSTRUCTIONS





MEDICATIONS ADMINISTERED

No Known Medications



MEDICAL (GENERAL) HISTORY





                    Type                Description         Date

 

                    Surgical History    No know Surgical history  

 

                    Hospitalization History childbirth          

 

                    Hospitalization History childbirth

## 2020-07-18 NOTE — XMS REPORT
[facility]

                             Created on: 2020



RomyLeida

External Reference #: [id number]

: 1990

Sex: Female



Demographics





                          Address                   [address] [place]  [address]

 

                          Preferred Language        Unknown

 

                          Marital Status            Unknown

 

                          Restorationism Affiliation     Unknown

 

                          Race                      Unknown

 

                          Ethnic Group              Unknown





Author





                          Author                    Leida Botello

 

                          Organization              [facility]

 

                          Address                   [address] [place]  [address]



 

                          Phone                     [phone]







Care Team Providers





                    Care Team Member Name Role                Phone

 

                    PHILIP Botello    Unavailable         [phone]







PROBLEMS





          Type      Condition ICD9-CM Code PDL19-BI Code Onset Dates Condition S

tatus SNOMED 

Code

 

             Problem      Encounter for insertion of intrauterine contraceptive 

device              Z30.430       

                          Active                    10948842

 

          Problem   Microcytic anemia           D50.9               Active    23

[id number]

 

          Problem   Itching             L29.9               Active    990008954

 

          Problem   Bug bites           W57.XXXA            Active    021266674







ALLERGIES

No Information



ENCOUNTERS





                Encounter       Location        Date            Diagnosis

 

                          [facility]  [address]

[address] [place] [address] 

[address]                              Viral upper respiratory trac

t infection J06.9

 

                          [facility] [place] [facility]  [id number]

[address] [place] [address] 

[address]                31 Oct, 2018               

 

                    [place] [address] [address] 24 

Oct, 2018                               Prenatal care in second trimester Z34.92

 

                    [place] [address] [address] 19 

Oct, 2018                               Microcytic anemia D50.9

 

                    [facility] [address] [address] 03 

Oct, 2018                               Microcytic anemia D50.9

 

                    [place] [address] [address] 26 

Sep, 2018                               Multigravida in first trimester Z34.81 a

nd Normal pregnancy in 

multigravida Z34.80

 

                    [place] [address] [address] 20 

Sep, 2018                                

 

                    [place] [address]

 [place] [address] 14 

Sep, 2018                                

 

                    [facility] [id number] [place] [address] 11 

Sep, 2018                                

 

                    [place] [id number] [place] [address] 07 

Sep, 2018                               Encounter for pregnancy test Z32.00

 

                          [facility]  [address] [facility] [id number]

[address] [place] [address] 

[address]                              Irritant contact dermatitis 

due to other chemical 

products L24.5

 

                    [place] [id number] [place] [address]                                Frequent headaches R51

 

                          [facility]  [facility] [address] [facility] [id number]

[address] [place] [address] 

[address]                              Frequent headaches R51

 

                          [facility]  [facility] [address] [facility] [id number]

[address] [place] [address] 

[address]                              Other viral agents as the ca

use of diseases classified 

elsewhere B97.89 and Acute upper respiratory infection, unspecified J06.9

 

                    [place] [id number] [place] [address]                                Pyelonephritis N12

 

                    [place] [address] [address]                                Leukorrhea N89.8 ; Acute vaginitis N76.0

 and Other specified bacterial

agents as the cause of diseases classified elsewhere B96.89

 

                    [place] [id number] [place] [address] 29 

Mar, 2016                               Right ovarian cyst N83.20

 

                    [place] [id number] [place] [address]                                 

 

                    [place] [address] [address]                                 

 

                    [place] [address] [address]                                Right ovarian cyst N83.20

 

                    [place] [address] [address] 11 

2016                               Encounter for insertion of intrauterine 

contraceptive device Z30.430

 

                    [place] [address] [id number] 

2016                               Encounter for counseling regarding contr

aception Z30.9

 

                    [address] [address] 04 

2016                                

 

                    [address] [address] 18 

Dec, 2015                               Well woman exam Z01.419 ; Weight gain R6

3.5 and BMI 27.0-27.9,adult 

Z68.27

 

                    [address] [address] 18 

Dec, 2015                               Erythema nodosum L52 and Fatigue R53.83

 

                    [address] [address] 16 

Dec, 2015                               Erythema nodosum L52

 

                    [address] [address] 30 

2015                               General counseling and advice for contra

ceptive management Z30.09 and 

OCP (oral contraceptive pills) initiation Z30.011

 

                    [address] [address] 27 

2015                               Bug bites W57.XXXA and Itching L29.9

 

                    [address] [address] 19 

2015                               Well woman exam Z01.419 ; Weight gain R6

3.5 and BMI 27.0-27.9,adult 

Z68.27

 

                    [address] [address] 27 

Oct, 2015                               Ankle pain, left M25.572

 

                    [facility] [address] [id number] 12 

May, 2015                               Dental examination V72.2

 

                    [facility] [address] [id number] 08 

May, 2015                               Dental examination V72.2

 

                    [facility] [address] [facility] [place] [address]                                 

 

                    [facility] [address] [facility] [place] [address]                                 

 

                    [facility] [address] [facility] [place] [address]                                 

 

                    [facility] [address] [facility] [place] [address]                                 

 

                    [facility] [address] [facility] [place] [address]                                 

 

                    [facility] [address] [facility] [place] [address]                                 

 

                    [facility] [address] [facility] [place] [address]                                 

 

                    [facility] [address] [facility] [place] [address]                                 

 

                    [facility] [address] [facility] [place] [address] 09 

Oct, 2014                                

 

                    [facility] [address] [facility] [place] [address] 09 

Oct, 2014                                

 

                    [facility] [address] [facility] [place] [address] 29 

Sep, 2014                                

 

                    [facility] [address] [facility] [place] [address] 29 

Sep, 2014                                

 

                    [facility] [address] [facility] [place] [address] 22 

Sep, 2014                                

 

                    [facility] [address] [facility] [place] [address] 22 

Sep, 2014                                

 

                    [facility] [address] [facility] [place] [address] 22 

Aug, 2014                                

 

                    [facility] [address] [facility] [place] [address] 22 

Aug, 2014                                

 

                    [facility] [address] [facility] [place] [address] 19 

May, 2014                                

 

                    [facility] [address] [facility] [id number] [place] [address] 19 

May, 2014                                

 

                    [facility] [address] [facility] [place] [address] 10 

Apr, 2014                                

 

                    [facility] [address] [facility] [place] [address] 10 

Apr, 2014                                

 

                    [facility] [address] [facility] [place] [address]                                 

 

                    [facility] [address] [facility] [place] [address]                                 

 

                    [facility] [address] [facility] [place] [address]                                 

 

                    [facility] [address] [facility] [place] [address]                                 

 

                    [facility] [address] [facility] [place] [address] 10 

Feb, 2014                                

 

                    [facility] [address] [facility] [place] [address] 10 

Feb, 2014                                

 

                    [facility] [address] [facility] [place] [address]                                 

 

                    [facility] [address] [facility] [place] [address]                                 

 

                    [facility] [address] [facility] [place] [address]                                 

 

                    [facility] [address] [facility] [place] [address]                                 

 

                    [facility] [address] [facility] [place] [address]                                 

 

                    [facility] [address] [id number] [place] [address]                                 

 

                    [facility] [address] [id number] [place] [address]                                 

 

                    [facility] [address] [facility] [id number] [place] [address]                                 

 

                    [facility] [address] [facility] [id number] [place] [address]                                 

 

                    [facility] [address] [facility] [place] [address]                                 

 

                    [facility] [address] [facility] [place] [address]                                 

 

                    [facility] [address] [facility] [id number] [place] [address]                                 

 

                    [facility] [address] [id number] [place] [address] 28 

Mar, 2013                                

 

                    [facility] [address] [facility] [place] [address] 25 

Mar, 2013                                

 

                    [facility] [address] [facility] [place] [address] 07 

Mar, 2013                                

 

                    [facility] [address] [facility] [place] [address] 04 

Mar, 2013                                

 

                    [facility] [address] [facility] [place] [address] 01 

Mar, 2013                                

 

                    [facility] [address] [facility] [place] [address]                                 

 

                    [facility] [address] [facility] [place] [address]                                 

 

                    [facility] [address] [facility] [place] [address]                                 

 

                    [facility] [address] [facility] [place] [address] 25 

Oct, 2012                                

 

                    [facility] [address] [facility] [place] [address] 25 

Oct, 2012                                

 

                    [facility] [address] [facility] [place] [address] 03 

Oct, 2012                                

 

                    [facility] [address] [facility] [place] [address]                                 

 

                    [facility] [address] [facility] [id number] [place] [address]                                 







IMMUNIZATIONS

No Known Immunizations



SOCIAL HISTORY

Never Assessed



REASON FOR VISIT





PLAN OF CARE





VITAL SIGNS





MEDICATIONS

No Known Medications



RESULTS

No Results



PROCEDURES

No Known procedures



INSTRUCTIONS





MEDICATIONS ADMINISTERED

No Known Medications



MEDICAL (GENERAL) HISTORY





                    Type                Description         Date

 

                    Surgical History    No know Surgical history  

 

                    Hospitalization History childbirth          

 

                    Hospitalization History childbirth          2019

## 2020-07-18 NOTE — XMS REPORT
[facility]

                             Created on: 2020



RomyLeida

External Reference #: [id number]

: 1990

Sex: Female



Demographics





                          Address                   [address] [place]  [address]

 

                          Preferred Language        Unknown

 

                          Marital Status            Unknown

 

                          Muslim Affiliation     Unknown

 

                          Race                      Unknown

 

                          Ethnic Group              Unknown





Author





                          Author                    Leida Botello

 

                          Organization              [facility]

 

                          Address                   [address] [place]  [address]



 

                          Phone                     [phone]







Care Team Providers





                    Care Team Member Name Role                Phone

 

                    PHILIP Botello    Unavailable         [phone]







PROBLEMS





          Type      Condition ICD9-CM Code AHZ25-ZW Code Onset Dates Condition S

tatus SNOMED 

Code

 

             Problem      Encounter for insertion of intrauterine contraceptive 

device              Z30.430       

                          Active                    01045344

 

          Problem   Microcytic anemia           D50.9               Active    23

[id number]

 

          Problem   Itching             L29.9               Active    363394301

 

          Problem   Bug bites           W57.XXXA            Active    [id number]







ALLERGIES

No Information



ENCOUNTERS





                Encounter       Location        Date            Diagnosis

 

                          [facility]  [address]

[address] [place] [address] 

[address]                              Viral upper respiratory trac

t infection J06.9

 

                          [facility] [place] [facility]  [id number]

[address] [place] [address] 

[address]                31 Oct, 2018               

 

                    [place] [address] [address] 

Oct, 2018                               Prenatal care in second trimester Z34.92

 

                    [place] [address] [address] 19 

Oct, 2018                               Microcytic anemia D50.9

 

                    [facility] [address] [address] 03 

Oct, 2018                               Microcytic anemia D50.9

 

                    [place] [address] [address] 26 

Sep, 2018                               Multigravida in first trimester Z34.81 a

nd Normal pregnancy in 

multigravida Z34.80

 

                    [place] [address] [address] 20 

Sep, 2018                                

 

                    [place] [address]

 [place] [address] 14 

Sep, 2018                                

 

                    [facility] [address] [id number] [place] [address] 11 

Sep, 2018                                

 

                    [place] [id number] [place] [address] 07 

Sep, 2018                               Encounter for pregnancy test Z32.00

 

                          [facility]  [address] [facility] [id number]

[address] [place] [address] 

[address]                              Irritant contact dermatitis 

due to other chemical 

products L24.5

 

                    [place] [id number] [place] [address]                                Frequent headaches R51

 

                          [facility]  [facility] [address] [facility] [id number]

[address] [place] [address] 

[address]                              Frequent headaches R51

 

                          [facility]  [facility] [address] [facility] [id number]

[address] [place] [address] 

[address]                              Other viral agents as the ca

use of diseases classified 

elsewhere B97.89 and Acute upper respiratory infection, unspecified J06.9

 

                    [place] [id number] [place] [address]                                Pyelonephritis N12

 

                    [place] [address] [address]                                Leukorrhea N89.8 ; Acute vaginitis N76.0

 and Other specified bacterial

agents as the cause of diseases classified elsewhere B96.89

 

                    [place] [id number] [place] [address] 29 

Mar, 2016                               Right ovarian cyst N83.20

 

                    [place] [id number] [place] [address]                                 

 

                    [place] [address] [address]                                 

 

                    [place] [address] [address]                                Right ovarian cyst N83.20

 

                    [place] [address] [address] 11 

2016                               Encounter for insertion of intrauterine 

contraceptive device Z30.430

 

                    [place] [address] [phone] 07 

2016                               Encounter for counseling regarding contr

aception Z30.9

 

                    [address] [address] 04 

2016                                

 

                    [address] [address] 18 

Dec, 2015                               Well woman exam Z01.419 ; Weight gain R6

3.5 and BMI 27.0-27.9,adult 

Z68.27

 

                    [address] [address] 18 

Dec, 2015                               Erythema nodosum L52 and Fatigue R53.83

 

                    [address] [address] 16 

Dec, 2015                               Erythema nodosum L52

 

                    [address] [address] 30 

2015                               General counseling and advice for contra

ceptive management Z30.09 and 

OCP (oral contraceptive pills) initiation Z30.011

 

                    [address] [address] 27 

2015                               Bug bites W57.XXXA and Itching L29.9

 

                    [address] [address] 19 

2015                               Well woman exam Z01.419 ; Weight gain R6

3.5 and BMI 27.0-27.9,adult 

Z68.27

 

                    [address] [address] 27 

Oct, 2015                               Ankle pain, left M25.572

 

                    [facility] [address] [id number] 12 

May, 2015                               Dental examination V72.2

 

                    [facility] [address] [id number] 08 

May, 2015                               Dental examination V72.2

 

                    [facility] [address] [facility] [place] [address]                                 

 

                    [facility] [address] [facility] [place] [address]                                 

 

                    [facility] [address] [facility] [place] [address]                                 

 

                    [facility] [address] [facility] [place] [address]                                 

 

                    [facility] [address] [facility] [place] [address]                                 

 

                    [facility] [address] [facility] [place] [address]                                 

 

                    [facility] [address] [facility] [place] [address]                                 

 

                    [facility] [address] [facility] [place] [address]                                 

 

                    [facility] [address] [facility] [place] [address] 09 

Oct, 2014                                

 

                    [facility] [address] [facility] [place] [address] 09 

Oct, 2014                                

 

                    [facility] [address] [facility] [place] [address] 29 

Sep, 2014                                

 

                    [facility] [address] [facility] [place] [address] 29 

Sep, 2014                                

 

                    [facility] [address] [facility] [place] [address] 22 

Sep, 2014                                

 

                    [facility] [address] [facility] [place] [address] 22 

Sep, 2014                                

 

                    [facility] [address] [facility] [place] [address] 22 

Aug, 2014                                

 

                    [facility] [address] [facility] [place] [address] 22 

Aug, 2014                                

 

                    [facility] [address] [facility] [place] [address] 19 

May, 2014                                

 

                    [facility] [address] [facility] [id number] [place] [address] 19 

May, 2014                                

 

                    [facility] [address] [facility] [place] [address] 10 

Apr, 2014                                

 

                    [facility] [address] [facility] [place] [address] 10 

Apr, 2014                                

 

                    [facility] [address] [facility] [place] [address]                                 

 

                    [facility] [address] [facility] [place] [address]                                 

 

                    [facility] [address] [facility] [place] [address]                                 

 

                    [facility] [address] [facility] [place] [address]                                 

 

                    [facility] [address] [facility] [place] [address] 10 

Feb, 2014                                

 

                    [facility] [address] [facility] [place] [address] 10 

Feb, 2014                                

 

                    [facility] [address] [facility] [place] [address]                                 

 

                    [facility] [address] [facility] [place] [address]                                 

 

                    [facility] [address] [facility] [place] [address]                                 

 

                    [facility] [address] [facility] [place] [address]                                 

 

                    [facility] [address] [facility] [place] [address]                                 

 

                    [facility] [address] [facility] [place] [address]                                 

 

                    [facility] [address] [facility] [place] [address]                                 

 

                    [facility] [address] [facility] [id number] [place] [address]                                 

 

                    [facility] [address] [facility] [id number] [place] [address]                                 

 

                    [facility] [address] [facility] [place] [address]                                 

 

                    [facility] [address] [facility] [place] [address]                                 

 

                    [facility] [address] [facility] [id number] [place] [address]                                 

 

                    [facility] [address] [facility] [place] [address] 28 

Mar, 2013                                

 

                    [facility] [address] [facility] [place] [address] 25 

Mar, 2013                                

 

                    [facility] [address] [facility] [place] [address] 07 

Mar, 2013                                

 

                    [facility] [address] [facility] [place] [address] 04 

Mar, 2013                                

 

                    [facility] [address] [facility] [place] [address] 01 

Mar, 2013                                

 

                    [facility] [address] [facility] [place] [address]                                 

 

                    [facility] [address] [facility] [place] [address]                                 

 

                    [facility] [address] [facility] [place] [address]                                 

 

                    [facility] [address] [facility] [place] [address] 25 

Oct, 2012                                

 

                    [facility] [address] [facility] [place] [address] 25 

Oct, 2012                                

 

                    [facility] [address] [facility] [place] [address] 03 

Oct, 2012                                

 

                    [facility] [address] [facility] [place] [address]                                 

 

                    [facility] [address] [facility] [id number] [place] [address]                                 







IMMUNIZATIONS

No Known Immunizations



SOCIAL HISTORY

Never Assessed



REASON FOR VISIT





PLAN OF CARE





VITAL SIGNS





MEDICATIONS

No Known Medications



RESULTS

No Results



PROCEDURES

No Known procedures



INSTRUCTIONS





MEDICATIONS ADMINISTERED

No Known Medications



MEDICAL (GENERAL) HISTORY





                    Type                Description         Date

 

                    Surgical History    No know Surgical history  

 

                    Hospitalization History childbirth          

 

                    Hospitalization History childbirth          2019

## 2020-07-18 NOTE — XMS REPORT
[facility]

                             Created on: 2020



Romy Leida

External Reference #: [id number]

: 1990

Sex: Female



Demographics





                          Address                   [address] [place]  [address]

 

                          Preferred Language        Unknown

 

                          Marital Status            Unknown

 

                          Scientology Affiliation     Unknown

 

                          Race                      Unknown

 

                          Ethnic Group              Unknown





Author





                          Author                    Leida Botello

 

                          Organization              [facility]

 

                          Address                   [address] [place]  [address]



 

                          Phone                     [phone]







Care Team Providers





                    Care Team Member Name Role                Phone

 

                    PHILIP Botello    Unavailable         [phone]







PROBLEMS





          Type      Condition ICD9-CM Code YWN93-TI Code Onset Dates Condition S

tatus SNOMED 

Code

 

             Problem      Encounter for insertion of intrauterine contraceptive 

device              Z30.430       

                          Active                    [id number]

 

          Problem   Microcytic anemia           D50.9               Active    23

[id number]

 

          Problem   Itching             L29.9               Active    050309989

 

          Problem   Bug bites           W57.XXXA            Active    [id number]







ALLERGIES

No Information



ENCOUNTERS





                Encounter       Location        Date            Diagnosis

 

                          [facility]  [id number]

[address] [place] [address] 

[address]                              Viral upper respiratory trac

t infection J06.9

 

                          [facility] [place] [facility]  [address] [facility] [id number]

[address] [place] [address] 

[address]                31 Oct, 2018               

 

                          [facility]     [id number]

[address] [place] [address]

                          24 Oct, 2018              Prenatal care in second trim

[place] Z34.92

 

                          [facility]     [address] [id number]

[address] [place] [address]

                          19 Oct, 2018              Microcytic anemia D50.9

 

                          [facility]     [address] [id number]

[address] [place] [address]

                          03 Oct, 2018              Microcytic anemia D50.9

 

                          [facility]     [address] [id number]

[address] [place] [address]

                          26 Sep, 2018              Multigravida in first trimes

ter Z34.81 and Normal pregnancy in 

multigravida Z34.80

 

                          [facility]     [address] [id number]

[address] [place] [address]

                          20 Sep, 2018               

 

                          [facility]     [address] [id number]

[address] [place] [address]

                          14 Sep, 2018               

 

                          [facility]     [address] [id number]

[address] [place] [address]

                          11 Sep, 2018               

 

                          [facility]     [address] [id number]

[address] [place] [address]

                          07 Sep, 2018              Encounter for pregnancy test

 Z32.00

 

                          [facility]  [address] [id number]

[address] [place] [address] 

[address]                              Irritant contact dermatitis 

due to other chemical 

products L24.5

 

                          [facility]     [address] [id number]

[address] [place] [address]

                                        Frequent headaches R51

 

                          [facility]  [address] [facility] [id number]

[address] [place] [address] 

[address]                              Frequent headaches R51

 

                          [facility]  [address] [facility] [id number]

[address] [place] [address] 

[address]                              Other viral agents as the ca

use of diseases classified 

elsewhere B97.89 and Acute upper respiratory infection, unspecified J06.9

 

                          [facility]     [address] [id number]

[address] [place] [address]

                                        Pyelonephritis N12

 

                          [place] [address] [facility] [id number]

[address] [place] [address]

                                        Leukorrhea N89.8 ; Acute vag

initis N76.0 and Other specified 

bacterial agents as the cause of diseases classified elsewhere B96.89

 

                          [facility]     [address] [id number]

[address] [place] [address]

                          29 Mar, 2016              Right ovarian cyst N83.20

 

                          [place] [address] [id number]

[address] [place] [address]

                                         

 

                          [facility]     [address] [facility] [id number]

[address] [place] [address]

                                         

 

                          [place] [address] [address]

                          13 2016              Right ovarian cyst N83.20

 

                          [place] [address] [address]

                          11 2016              Encounter for insertion of i

ntrauterine contraceptive device 

Z30.430

 

                          [place] [address] [address]

                          07 2016              Encounter for counseling reg

arding contraception Z30.9

 

                          [place] [address] [address]

                          04 2016               

 

                          [place] [address] [address]

                          18 Dec, 2015              Well woman exam Z01.419 ; We

ight gain R63.5 and BMI 27.0-27.9,adult

Z68.27

 

                          [address] [address]

                          18 Dec, 2015              Erythema nodosum L52 and Fat

igue R53.83

 

                          [place] [address] [address]

                          16 Dec, 2015              Erythema nodosum L52

 

                          [place] [address] [address]

                          30 2015              General counseling and advic

e for contraceptive management Z30.09 

and OCP (oral contraceptive pills) initiation Z30.011

 

                          [place] [address] [address]

                          27 2015              Bug bites W57.XXXA and Itchi

ng L29.9

 

                          [place] [address] [address]

                          19 2015              Well woman exam Z01.419 ; We

ight gain R63.5 and BMI 27.0-27.9,adult

Z68.27

 

                          [place] [address] [address]

                          27 Oct, 2015              Ankle pain, left M25.572

 

                          [facility]   [address] [id number]

[address] [id number]

                          12 May, 2015              Dental examination V72.2

 

                          [facility]   [address] [id number]

[address] [place] [id number]

                          08 May, 2015              Dental examination V72.2

 

                          [facility]     [address] [id number]

[address] [place] [address]

                                         

 

                          [facility]     [address] [id number]

[address] [place] [address]

                                         

 

                          [facility]     [address] [id number]

[address] [place] [address]

                                         

 

                          [facility]     [address] [id number]

[address] [place] [address]

                                         

 

                          [facility]     [address] [id number]

[address] [place] [address]

                                         

 

                          [facility]     [address] [id number]

[address] [place] [address]

                                         

 

                          [facility]     [address] [id number]

[address] [place] [address]

                                         

 

                          [facility]     [address] [id number]

[address] [place] [address]

                                         

 

                          [facility]     [address] [id number]

[address] [place] [address]

                          09 Oct, 2014               

 

                          [facility]     [address] [id number]

[address] [place] [address]

                          09 Oct, 2014               

 

                          [facility]     [address] [id number]

[address] [place] [address]

                          29 Sep, 2014               

 

                          [facility]     [address] [id number]

[address] [place] [address]

                          29 Sep, 2014               

 

                          [facility]     [address] [id number]

[address] [place] [address]

                          22 Sep, 2014               

 

                          [facility]     [address] [id number]

[address] [place] [address], [place] [address]

                          22 Sep, 2014               

 

                          [facility]     [address] [id number]

[address] [place] [address], [place] [address]

                          22 Aug, 2014               

 

                          [facility]     [address] [id number]

[address] [place] [address], [place] [address]

                          22 Aug, 2014               

 

                          [facility]     [address] [id number]

[address] [place] [address], [place] [address]

                          19 May, 2014               

 

                          [facility]     [address] [id number]

[address] [place] [address], [place] [address]

                          19 May, 2014               

 

                          [facility]     [address] [id number]

[address] [place] [address], [place] [address]

                          10 Apr, 2014               

 

                          [facility]     [address] [id number]

[address] [place] [address], [place] [address]

                          10 Apr, 2014               

 

                          [facility]     [address] [id number]

[address] [place] [address], [place] [address]

                                         

 

                          [facility]     [address] [id number]

[address] [place] [address], [place] [address]

                                         

 

                          [facility]     [address] [id number]

[address] [place] [address], [place] [address]

                                         

 

                          [facility]     [address] [id number]

[address] [place] [address], [place] [address]

                                         

 

                          [facility]     [address] [id number]

[address] [place] [address], [place] [address]

                          10 Feb, 2014               

 

                          [facility]     [address] [id number]

[address] [place] [address], [place] [address]

                          10 Feb, 2014               

 

                          [facility]     [address] [id number]

[address] [place] [address], [place] [address]

                                         

 

                          [facility]     [address] [id number]

[address] [place] [address], [place] [address]

                                         

 

                          [facility]     [address] [id number]

[address] [place] [address], [place] [address]

                                         

 

                          [facility]     [address] [id number]

[address] [place] [address], [place] [address]

                                         

 

                          [facility]     [address] [id number]

[address] [place] [address], [place] [address]

                                         

 

                          [facility]     [address] [id number]

[address] [place] [address], [place] [address]

                                         

 

                          [facility]     [address] [id number]

[address] [place] [address], [place] [address]

                                         

 

                          [facility]     [address] [id number]

[address] [place] [address], [place] [address]

                                         

 

                          [facility]     [address] [id number]

[address] [place] [address], [place] [address]

                                         

 

                          [facility]     [address] [id number]

[address] [place] [address], [place] [address]

                                         

 

                          [facility]     [address] [id number]

[address] [place] [address], [place] [address]

                                         

 

                          [facility]     [address] [id number]

[address] [place] [address], [place] [address]

                                         

 

                          [facility]     [address] [id number]

[address] [place] [address], [place] [address]

                          28 Mar, 2013               

 

                          [facility]     [address] [id number]

[address] [place] [address], [place] [address]

                          25 Mar, 2013               

 

                          [facility]     [address] [id number]

[address] [place] [address], [place] [address]

                          07 Mar, 2013               

 

                          [facility]     [address] [id number]

[address] [place] [address], [place] [address]

                          04 Mar, 2013               

 

                          [facility]     [address] [id number]

[address] [place] [address], [place] [address]

                          01 Mar, 2013               

 

                          [facility]     [address] [id number]

[address] [place] [address], [place] [address]

                                         

 

                          [facility]     [address] [id number]

[address] [place] [address]

                                         

 

                          [facility]     [address] [id number]

[address] [place] [address]

                                         

 

                          [facility]     [address] [id number]

[address] [place] [address]

                          25 Oct, 2012               

 

                          [facility]     [address] [id number]

[address] [place] [address]

                          25 Oct, 2012               

 

                          [facility]     [address] [id number]

[address] [place] [address]

                          03 Oct, 2012               

 

                          [facility]     [address] [id number]

[address] [place] [address]

                                         

 

                          [facility]     [address] [id number]

[address] [place] [address]

                                         







IMMUNIZATIONS

No Known Immunizations



SOCIAL HISTORY

Never Assessed



REASON FOR VISIT





PLAN OF CARE





VITAL SIGNS





                    Height              62 in               2014

 

                    Weight              145.38 lbs          2014

 

                    Temperature         97.8 degrees Fahrenheit 2014

 

                    Heart Rate          80 bpm              2014

 

                    Respiratory Rate    18                  2014

 

                    Blood pressure systolic 124 mmHg            2014

 

                    Blood pressure diastolic 80 mmHg             2014







MEDICATIONS

No Known Medications



RESULTS

No Results



PROCEDURES





                Procedure       Date Ordered    Result          Body Site

 

                TRICHOMONAS VAGIN, DIR PROBE 2014                     

 

                SCR PAP SMER;NEW PT OBTAIN PREP&CONVY-LAB 2014          

           

 

                CYTOPATH C/V AUTO FLUID REDO 2014                     

 

                CHYLMD TRACH, DNA, AMP PROBE 2014                     

 

                URINE PREGNANCY TEST 2014                     

 

                CULTURE, BACTERIA, OTHER 2014                     







INSTRUCTIONS





MEDICATIONS ADMINISTERED

No Known Medications



MEDICAL (GENERAL) HISTORY





                    Type                Description         Date

 

                    Surgical History    No know Surgical history  

 

                    Hospitalization History childbirth          

 

                    Hospitalization History childbirth

## 2020-07-18 NOTE — XMS REPORT
[facility]

                             Created on: 2020



Romy Leida

External Reference #: [id number]

: 1990

Sex: Female



Demographics





                          Address                   [address]

[place]  [address]

 

                          Preferred Language        Unknown

 

                          Marital Status            Unknown

 

                          Advent Affiliation     Unknown

 

                          Race                      Unknown

 

                          Ethnic Group              Unknown





Author





                          Author                    Leida Mullins Doctor

 

                          Organization              [facility]

 

                          Address                   Unknown

 

                          Phone                     Unavailable







Care Team Providers





                    Care Team Member Name Role                Phone

 

                    Migration,  Doctor  Unavailable         Unavailable







PROBLEMS





          Type      Condition ICD9-CM Code HXD64-QU Code Onset Dates Condition S

tatus SNOMED 

Code

 

             Problem      Encounter for insertion of intrauterine contraceptive 

device              Z30.430       

                          Active                    [id number]

 

          Problem   Microcytic anemia           D50.9               Active    23

[id number]

 

          Problem   Itching             L29.9               Active    [id number]

 

          Problem   Bug bites           W57.XXXA            Active    [id number]







ALLERGIES

No Information



ENCOUNTERS





                Encounter       Location        Date            Diagnosis

 

                          [facility]  [address] [id number]

[address] [place] [address] 

[address]                              Viral upper respiratory trac

t infection J06.9

 

                          [facility]  [address] [id number]

[address] [place] [address] 

[address]                31 Oct, 2018               

 

                    [address] [address] [address] 24 

Oct, 2018                               Prenatal care in second trimester Z34.92

 

                    [place] [address] [address] 19 

Oct, 2018                               Microcytic anemia D50.9

 

                    [place] [address] [address] 03 

Oct, 2018                               Microcytic anemia D50.9

 

                    [place] [address] [address] 26 

Sep, 2018                               Multigravida in first trimester Z34.81 a

nd Normal pregnancy in 

multigravida Z34.80

 

                    [place] [address] [address] 20 

Sep, 2018                                

 

                    [place] [address] [address] 14 

Sep, 2018                                

 

                    [place] [address]

 [place] [address] 11 

Sep, 2018                                

 

                    [place] [address] [address] 07 

Sep, 2018                               Encounter for pregnancy test Z32.00

 

                          [facility]  [address] [facility] [id number]

[address] [place] [address] 

[address]                              Irritant contact dermatitis 

due to other chemical 

products L24.5

 

                    [place] [address] [address]                                Frequent headaches R51

 

                          [facility]  [address] [facility] [id number]

[address] [place] [address] 

[address]                              Frequent headaches R51

 

                          [facility]  [facility] [address] [id number]

[address] [place] [address] 

[address]                              Other viral agents as the ca

use of diseases classified 

elsewhere B97.89 and Acute upper respiratory infection, unspecified J06.9

 

                    [place] [address] [address]                                Pyelonephritis N12

 

                    [place] [address] [address]                                Leukorrhea N89.8 ; Acute vaginitis N76.0

 and Other specified bacterial

agents as the cause of diseases classified elsewhere B96.89

 

                    [place] [address] [address] 29 

Mar, 2016                               Right ovarian cyst N83.20

 

                    [place] [address] [address]                                 

 

                    [place] [address] [address]                                 

 

                    [place] [address] [address]                                Right ovarian cyst N83.20

 

                    [address] [address] [address]                                Encounter for insertion of intrauterine 

contraceptive device Z30.430

 

                    [address] [address] [address] 07 

2016                               Encounter for counseling regarding contr

aception Z30.9

 

                    [address] [address] [address] 04 

2016                                

 

                    [place] [address] [address] 18 

Dec, 2015                               Well woman exam Z01.419 ; Weight gain R6

3.5 and BMI 27.0-27.9,adult 

Z68.27

 

                    [place] [address] [address] 18 

Dec, 2015                               Erythema nodosum L52 and Fatigue R53.83

 

                    [address] [address] 16 

Dec, 2015                               Erythema nodosum L52

 

                    [address] [address] 30 

2015                               General counseling and advice for contra

ceptive management Z30.09 and 

OCP (oral contraceptive pills) initiation Z30.011

 

                    [address] [address] 27 

2015                               Bug bites W57.XXXA and Itching L29.9

 

                    [address] [address]                                Well woman exam Z01.419 ; Weight gain R6

3.5 and BMI 27.0-27.9,adult 

Z68.27

 

                    [address] [address] 27 

Oct, 2015                               Ankle pain, left M25.572

 

                    [facility] [address] [id number] 12 

May, 2015                               Dental examination V72.2

 

                    [facility] [address] [id number] 08 

May, 2015                               Dental examination V72.2

 

                    [address] [address] 14 

2015                                

 

                    [facility] [address] [id number] [place] [address]                                 

 

                    [facility] [address] [id number] [place] [address]                                 

 

                    [facility] [address] [id number] [place] [address] ,                                 

 

                    [facility] [address] [id number] [place] [address]                                 

 

                    [facility] [address] [id number] [place] [address]                                 

 

                    [facility] [address] [id number] [place] [address]                                 

 

                    [facility] [address] [id number] [place] [address]                                 

 

                    [facility] [address] [id number] [place] [address] 09 

Oct, 2014                                

 

                    [facility] [address] [id number] [place] [address] 09 

Oct, 2014                                

 

                    [facility] [address] [id number] [place] [address] 29 

Sep, 2014                                

 

                    [facility] [address] [facility] [place] [address] 29 

Sep, 2014                                

 

                    [facility] [address] [id number] [place] [address] 22 

Sep, 2014                                

 

                    [facility] [address] [id number] [place] [address] 22 

Sep, 2014                                

 

                    [facility] [address] [id number] [place] [address] 22 

Aug, 2014                                

 

                    [facility] [address] [id number] [place] [address] 22 

Aug, 2014                                

 

                    [facility] [address] [id number] [place] [address] 19 

May, 2014                                

 

                    [facility] [address] [id number] [place] [address] 19 

May, 2014                                

 

                    [facility] [address] [id number] [place] [address] 10 

Apr, 2014                                

 

                    [facility] [address] [id number] [place] [address] 10 

Apr, 2014                                

 

                    [facility] [address] [id number] [place] [address]                                 

 

                    [facility] [address] [id number] [place] [address]                                 

 

                    [facility] [address] [id number] [place] [address]                                 

 

                    [facility] [address] [id number] [place] [address]                                 

 

                    [facility] [address] [id number] [place] [address] 10 

Feb, 2014                                

 

                    [facility] [address] [id number] [place] [address] 10 

Feb, 2014                                

 

                    [facility] [address] [id number] [place] [address]                                 

 

                    [facility] [address] [id number] [place] [address]                                 

 

                    [facility] [address] [id number] [place] [address]                                 

 

                    [facility] [address] [id number] [place] [address]                                 

 

                    [facility] [address] [id number] [place] [address]                                 

 

                    [facility] [address] [id number] [place] [address]                                 

 

                    [facility] [address] [id number] [place] [address]                                 

 

                    [facility] [address] [id number] [place] [address]                                 

 

                    [facility] [address] [facility] [id number] [place] [address]                                 

 

                    [facility] [address] [facility] [id number] [place] [address]                                 

 

                    [facility] [address] [id number] [place] [address]                                 

 

                    [facility] [address] [id number] [place] [address]                                 

 

                    [facility] [address] [facility] [id number] [place] [address] 28 

Mar, 2013                                

 

                    [facility] [address] [id number] [place] [address] 25 

Mar, 2013                                

 

                    [facility] [address] [id number] [place] [address] 07 

Mar, 2013                                

 

                    [facility] [address] [id number] [place] [address] 04 

Mar, 2013                                

 

                    [facility] [address] [id number] [place] [address] 01 

Mar, 2013                                

 

                    [facility] [address] [id number] [place] [address]                                 

 

                    [facility] [address] [id number] [place] [address]                                 

 

                    [facility] [address] [id number] [place] [address]                                 

 

                    [facility] [address] [id number] [place] [address] 25 

Oct, 2012                                

 

                    [facility] [address] [id number] [place] [address] 25 

Oct, 2012                                

 

                    [facility] [address] [id number] [place] [address] 03 

Oct, 2012                                

 

                    [facility] [address] [id number] [place] [address]                                 

 

                    [facility] [address] [facility] [place] [address]                                 







IMMUNIZATIONS

No Known Immunizations



SOCIAL HISTORY

Never Assessed



REASON FOR VISIT





PLAN OF CARE





VITAL SIGNS





MEDICATIONS

No Known Medications



RESULTS

No Results



PROCEDURES





                Procedure       Date Ordered    Result          Body Site

 

                THER/PROPH/DIAG INJ, SC/IM May 19, 2014                     

 

                Medroxyprogesterone inj May 19, 2014                     

 

                URINE PREGNANCY TEST May 19, 2014                     







INSTRUCTIONS





MEDICATIONS ADMINISTERED

No Known Medications



MEDICAL (GENERAL) HISTORY





                    Type                Description         Date

 

                    Surgical History    No know Surgical history  

 

                    Hospitalization History childbirth          

 

                    Hospitalization History childbirth          2019

## 2020-07-18 NOTE — XMS REPORT
[facility]

                             Created on: 2020



RomyLeida

External Reference #: [id number]

: 1990

Sex: Female



Demographics





                          Address                   [address] [place]  [address]

 

                          Preferred Language        Unknown

 

                          Marital Status            Unknown

 

                          Buddhist Affiliation     Unknown

 

                          Race                      Unknown

 

                          Ethnic Group              Unknown





Author





                          Author                    Leida Botello

 

                          Organization              [facility]

 

                          Address                   [address] [place]  [address]



 

                          Phone                     [phone]







Care Team Providers





                    Care Team Member Name Role                Phone

 

                    PHILIP Botello    Unavailable         [phone]







PROBLEMS





          Type      Condition ICD9-CM Code CIB57-QK Code Onset Dates Condition S

tatus SNOMED 

Code

 

             Problem      Encounter for insertion of intrauterine contraceptive 

device              Z30.430       

                          Active                    92693241

 

          Problem   Microcytic anemia           D50.9               Active    23

[id number]

 

          Problem   Itching             L29.9               Active    936682421

 

          Problem   Bug bites           W57.XXXA            Active    [id number]







ALLERGIES

No Information



ENCOUNTERS





                Encounter       Location        Date            Diagnosis

 

                          [facility]  [address]

[address] [place] [address] 

[address]                              Viral upper respiratory trac

t infection J06.9

 

                          [facility]  [id number]

[address] [place] [address] 

[address]                31 Oct, 2018               

 

                    [place] [address] [address] 24 

Oct, 2018                               Prenatal care in second trimester Z34.92

 

                    [place] [address] [address] 19 

Oct, 2018                               Microcytic anemia D50.9

 

                    [facility] [address] [address] 03 

Oct, 2018                               Microcytic anemia D50.9

 

                    [place] [address] [address] 26 

Sep, 2018                               Multigravida in first trimester Z34.81 a

nd Normal pregnancy in 

multigravida Z34.80

 

                    [place] [address] [address] 20 

Sep, 2018                                

 

                    [place] [address]

 [place] [address] 14 

Sep, 2018                                

 

                    [facility] [address] [id number] [place] [address] 11 

Sep, 2018                                

 

                    [place] [id number] [place] [address] 07 

Sep, 2018                               Encounter for pregnancy test Z32.00

 

                          [facility]  [address] [facility] [id number]

[address] [place] [address] 

[address]                              Irritant contact dermatitis 

due to other chemical 

products L24.5

 

                    [place] [id number] [place] [address]                                Frequent headaches R51

 

                          [facility]  [facility] [address] [facility] [id number]

[address] [place] [address] 

[address]                              Frequent headaches R51

 

                          [facility]  [facility] [address] [facility] [id number]

[address] [place] [address] 

[address]                              Other viral agents as the ca

use of diseases classified 

elsewhere B97.89 and Acute upper respiratory infection, unspecified J06.9

 

                    [place] [id number] [place] [address]                                Pyelonephritis N12

 

                    [place] [address] [address]                                Leukorrhea N89.8 ; Acute vaginitis N76.0

 and Other specified bacterial

agents as the cause of diseases classified elsewhere B96.89

 

                    [place] [id number] [place] [address] 29 

Mar, 2016                               Right ovarian cyst N83.20

 

                    [place] [id number] [place] [address]                                 

 

                    [place] [address] [address]                                 

 

                    [place] [address] [address]                                Right ovarian cyst N83.20

 

                    [place] [address] [address] 11 

2016                               Encounter for insertion of intrauterine 

contraceptive device Z30.430

 

                    [place] [address] [id number] 

2016                               Encounter for counseling regarding contr

aception Z30.9

 

                    [address] [address] 04 

2016                                

 

                    [address] [address] 18 

Dec, 2015                               Well woman exam Z01.419 ; Weight gain R6

3.5 and BMI 27.0-27.9,adult 

Z68.27

 

                    [address] [address] 18 

Dec, 2015                               Erythema nodosum L52 and Fatigue R53.83

 

                    [address] [address] 16 

Dec, 2015                               Erythema nodosum L52

 

                    [address] [address] 30 

2015                               General counseling and advice for contra

ceptive management Z30.09 and 

OCP (oral contraceptive pills) initiation Z30.011

 

                    [address] [address] 27 

2015                               Bug bites W57.XXXA and Itching L29.9

 

                    [address] [address] 19 

2015                               Well woman exam Z01.419 ; Weight gain R6

3.5 and BMI 27.0-27.9,adult 

Z68.27

 

                    [address] [address] 27 

Oct, 2015                               Ankle pain, left M25.572

 

                    [facility] [address] [id number] 12 

May, 2015                               Dental examination V72.2

 

                    [facility] [address] [id number] 08 

May, 2015                               Dental examination V72.2

 

                    [facility] [address] [id number] [place] [address]                                 

 

                    [facility] [address] [id number] [place] [address]                                 

 

                    [facility] [address] [id number] [place] [address]                                 

 

                    [facility] [address] [id number] [place] [address]                                 

 

                    [facility] [address] [id number] [place] [address]                                 

 

                    [facility] [address] [id number] [place] [address]                                 

 

                    [facility] [address] [id number] [place] [address]                                 

 

                    [facility] [address] [id number] [place] [address]                                 

 

                    [facility] [address] [facility] [place] [address] 09 

Oct, 2014                                

 

                    [facility] [address] [facility] [place] [address] 09 

Oct, 2014                                

 

                    [facility] [address] [facility] [place] [address] 29 

Sep, 2014                                

 

                    [facility] [address] [facility] [place] [address] 29 

Sep, 2014                                

 

                    [facility] [address] [facility] [place] [address] 22 

Sep, 2014                                

 

                    [facility] [address] [facility] [place] [address] 22 

Sep, 2014                                

 

                    [facility] [address] [facility] [place] [address] 22 

Aug, 2014                                

 

                    [facility] [address] [facility] [place] [address] 22 

Aug, 2014                                

 

                    [facility] [address] [id number] [place] [address] 19 

May, 2014                                

 

                    [facility] [address] [facility] [id number] [place] [address] 19 

May, 2014                                

 

                    [facility] [address] [facility] [place] [address] 10 

Apr, 2014                                

 

                    [facility] [address] [id number] [place] [address] 10 

Apr, 2014                                

 

                    [facility] [address] [id number] [place] [address]                                 

 

                    [facility] [address] [id number] [place] [address]                                 

 

                    [facility] [address] [id number] [place] [address]                                 

 

                    [facility] [address] [id number] [place] [address]                                 

 

                    [facility] [address] [id number] [place] [address] 10 

Feb, 2014                                

 

                    [facility] [address] [id number] [place] [address] 10 

Feb, 2014                                

 

                    [facility] [address] [id number] [place] [address]                                 

 

                    [facility] [address] [id number] [place] [address]                                 

 

                    [facility] [address] [id number] [place] [address]                                 

 

                    [facility] [address] [id number] [place] [address]                                 

 

                    [facility] [address] [id number] [place] [address]                                 

 

                    [facility] [address] [id number] [place] [address]                                 

 

                    [facility] [address] [id number] [place] [address]                                 

 

                    [facility] [address] [facility] [id number] [place] [address]                                 

 

                    [facility] [address] [facility] [id number] [place] [address]                                 

 

                    [facility] [address] [id number] [place] [address]                                 

 

                    [facility] [address] [id number] [place] [address]                                 

 

                    [facility] [address] [facility] [id number] [place] [address]                                 

 

                    [facility] [address] [id number] [place] [address] 28 

Mar, 2013                                

 

                    [facility] [address] [id number] [place] [address] 25 

Mar, 2013                                

 

                    [facility] [address] [id number] [place] [address] 07 

Mar, 2013                                

 

                    [facility] [address] [id number] [place] [address] 04 

Mar, 2013                                

 

                    [facility] [address] [id number] [place] [address] 01 

Mar, 2013                                

 

                    [facility] [address] [id number] [place] [address]                                 

 

                    [facility] [address] [id number] [place] [address]                                 

 

                    [facility] [address] [id number] [place] [address]                                 

 

                    [facility] [address] [id number] [place] [address] 25 

Oct, 2012                                

 

                    [facility] [address] [id number] [place] [address] 25 

Oct, 2012                                

 

                    [facility] [address] [id number] [place] [address] 03 

Oct, 2012                                

 

                    [facility] [address] [id number] [place] [address]                                 

 

                    [facility] [address] [facility] [id number] [place] [address]                                 







IMMUNIZATIONS

No Known Immunizations



SOCIAL HISTORY

Never Assessed



REASON FOR VISIT





PLAN OF CARE





VITAL SIGNS





MEDICATIONS

No Known Medications



RESULTS

No Results



PROCEDURES

No Known procedures



INSTRUCTIONS





MEDICATIONS ADMINISTERED

No Known Medications



MEDICAL (GENERAL) HISTORY





                    Type                Description         Date

 

                    Surgical History    No know Surgical history  

 

                    Hospitalization History childbirth          

 

                    Hospitalization History childbirth          2019

## 2020-09-10 NOTE — ED ABDOMINAL PAIN
General


Chief Complaint:  Abdominal/GI Problems


Stated Complaint:  UPPER ABD & BACK PAIN/NASUA/VOMITING


Nursing Triage Note:  


PT HERE WITH SUDDEN ONSET EPIGASTRIC PAIN, N/V. STATES THAT THE PAIN IS 8/10. 


REPORTS PAIN IS SIMILAR TO ANOTHER EPISODE SHE HAD AND WAS TOLD IT WAS RELATED 


TO HER LIVER/GALLBLADDER. PT STATES SHE HAS VOMITED X 4 TIMES.


Sepsis Screen:  No Definite Risk


Source of Information:  Patient





History of Present Illness


Date Seen by Provider:  Sep 10, 2020


Time Seen by Provider:  20:35


Initial Comments


PT ARRIVES VIA POV FROM HOME


C/O EPIGASTRIC ABDOMINAL PAIN THAT RADIATES TO RIGHT UPPER ABDOMEN AND AROUND TO

RIGHT FLANK


PAIN BEGAN ABOUT 3 HOURS AGO


HAD AN  NOODLE SOUP 4 HOURS PRIOR


PAIN IS CONSTANT AND NOTHING WORSENS OR IMPROVES PAIN. NO RELIEF WITH IBUPROFEN


C/O NAUSEA AND VOMITED X 4, DIARRHEA X 1


C/O URINARY FREQUENCY AND URGENCY, BUT NO PAIN ON URINATION


NO FEVER





LMP 20. NO BIRTH CONTROL, TRYING TO CONCEIVE. HAS NOT DONE HOME PREGNANCY 

TEST





HAD SAME TYPE OF PAIN AND WAS SEEN HERE 20 AND WAS SUSPECTED SHE WAS 

HAVING BILIARY COLIC, GIVEN RX FOR ZOFRAN AND OMEPRAOLE, AND REFERRED TO DR. DAVIS


PT DID NOT FOLLOW UP WITH DR. DAVIS OR ANYONE SINCE THAT VISIT


DID NOT GET PRESCRIPTIONS FILLED





PT DENIES ANY PRIOR ABDOMINAL SURGERIES OR OTHER GI OR  PROBLEMS





PCP: [facility] [place]


OB/GYN: DR. WISEMAN





Allergies and Home Medications


Allergies


Coded Allergies:  


     No Known Drug Allergies (Unverified , 10/31/18)





Home Medications


Acetaminophen 500 Mg Tablet, 1,000 MG PO Q8H


   Prescribed by: MAGALIE WISEMAN on 3/19/19 0240


Doxylamine/Pyridoxine HCl 1 Each Tablet.dr, 2 EACH PO HS


   Prescribed by: MEEK CHIU on 9/10/20 2134


Ferrous Sulfate 325 Mg Tablet, 325 MG PO DAILY


   Prescribed by: MEEK CHIU on 9/10/20 2131


Ibuprofen 600 Mg Tablet, 600 MG PO Q6H


   Prescribed by: MAGALIE WISEMAN on 3/19/19 0240


Omeprazole 40 Mg Capsule.dr, 40 MG PO DAILY


   Prescribed by: MARJORIE KEBEDE on 20 1326


Ondansetron 4 Mg Tab.rapdis, 4 MG PO Q6H PRN for NAUSEA/VOMITING


   Prescribed by: MARJORIE KEBEDE on 20 1326


Prenatal Vit W-Ca,Fe,FA(<1 mg) 1 Each Tablet, 1 EACH PO DAILY


   Prescribed by: MEEK CHIU on 9/10/20 2131





Patient Home Medication List


Home Medication List Reviewed:  Yes





Review of Systems


Review of Systems


Constitutional:  no symptoms reported


Respiratory:  No Symptoms Reported


Cardiovascular:  No Symptoms Reported


Gastrointestinal:  See HPI, Abdominal Pain, Diarrhea, Nausea, Vomiting


Genitourinary:  See HPI; Denies Burning; Frequency, Flank Pain; Denies 

Hematuria; Urgency


Musculoskeletal:  see HPI, back pain


Skin:  no symptoms reported; No rash


Psychiatric/Neurological:  No Symptoms Reported


Endocrine:  No Symptoms Reported


Hematologic/Lymphatic:  No Symptoms Reported





Past Medical-Social-Family Hx


Past Med/Social Hx:  Reviewed and Corrections made


Patient Social History


Alcohol Use:  Denies Use


Recreational Drug Use:  No


Smoking Status:  Never a Smoker


2nd Hand Smoke Exposure:  No


Recent Foreign Travel:  No


Contact w/Someone Who Travel:  No


Recent Infectious Disease Expo:  No


Recent Hopitalizations:  No





Immunizations Up To Date


Tetanus Booster (TDap):  Less than 5yrs





Seasonal Allergies


Seasonal Allergies:  No





Past Medical History


Surgeries:  No


Respiratory:  No


Cardiac:  No


Neurological:  No


Last Menstrual Period:  Aug 1, 2020


Hx :  2


Hx Para:  2


Hx Total # of Abortions (Sp):  0


Female Reproductive Disorders:  Denies


Sexually Transmitted Disease:  No


HIV/AIDS:  No


Genitourinary:  No


Gastrointestinal:  No


Musculoskeletal:  No


Endocrine:  No


HEENT:  No


Cancer:  No


Psychosocial:  No


Integumentary:  No


Blood Disorders:  Yes (ENEMIA WITH PREGNANCIES-REQUIRED TRANSFUSIONS)


Adverse Reaction/Blood Tranf:  No


YES--WITH PREGNANCIES





Family Medical History





Autism in sibling


  G8 SISTER


Hypertension


  19 FATHER


  19 MOTHER





Physical Exam


Vital Signs





Vital Signs - First Documented








 9/10/20





 20:27


 


Temp 36.5


 


Pulse 71


 


Resp 20


 


B/P (MAP) 122/80 (94)


 


Pulse Ox 100


 


O2 Delivery Room Air





Capillary Refill : Less Than 3 Seconds


Height/Weight/BMI


Height: 5'5.00"


Weight: 171lbs. 8.0oz. [id number]; 24.00 BMI


Method:Stated


General Appearance:  WD/WN, no apparent distress, other (WALKS UPRIGHT AND MOVES

WITHOUT DIFFICULTY)


HEENT:  No scleral icterus (R), No scleral icterus (L)


Neck:  normal inspection


Respiratory:  normal breath sounds, no respiratory distress, no accessory muscle

use


Cardiovascular:  regular rate, rhythm, no murmur


Gastrointestinal:  normal bowel sounds, soft, no organomegaly, no pulsatile 

mass; No distended, No guarding, No rebound; tenderness (MODERATE EPIGASTRIC AND

RUQ AND RIGHT FLANK TENDERNESS. NO LOWER ABDOMINAL TENDERNESS OR PAIN ); No 

hernia, No mass


Extremities:  normal inspection


Back:  CVA tenderness (R)


Neurologic/Psychiatric:  CNs II-XII nml as tested, no motor/sensory deficits, 

alert, normal mood/affect, oriented x 3


Skin:  normal color, warm/dry; No rash





Progress/Results/Core Measures


Results/Orders


Lab Results





Laboratory Tests








Test


 9/10/20


20:30 9/10/20


20:37 Range/Units


 


 


White Blood Count


 11.2 H


 


 4.3-11.0


10^3/uL


 


Red Blood Count


 3.96 L


 


 4.35-5.85


10^6/uL


 


Hemoglobin 7.8 L  11.5-16.0  G/DL


 


Hematocrit 27 L  35-52  %


 


Mean Corpuscular Volume 68 L  80-99  FL


 


Mean Corpuscular Hemoglobin 20 L  25-34  PG


 


Mean Corpuscular Hemoglobin


Concent 29 L


 


 32-36  G/DL





 


Red Cell Distribution Width 21.9 H  10.0-14.5  %


 


Platelet Count


 527 H


 


 130-400


10^3/uL


 


Mean Platelet Volume 9.0   7.4-10.4  FL


 


Neutrophils (%) (Auto) 81 H  42-75  %


 


Lymphocytes (%) (Auto) 13   12-44  %


 


Monocytes (%) (Auto) 5   0-12  %


 


Eosinophils (%) (Auto) 1   0-10  %


 


Basophils (%) (Auto) 0   0-10  %


 


Neutrophils # (Auto) 9.1 H  1.8-7.8  X 10^3


 


Lymphocytes # (Auto) 1.4   1.0-4.0  X 10^3


 


Monocytes # (Auto) 0.6   0.0-1.0  X 10^3


 


Eosinophils # (Auto)


 0.1 


 


 0.0-0.3


10^3/uL


 


Basophils # (Auto)


 0.0 


 


 0.0-0.1


10^3/uL


 


Sodium Level 136   135-145  MMOL/L


 


Potassium Level 3.4 L  3.6-5.0  MMOL/L


 


Chloride Level 106     MMOL/L


 


Carbon Dioxide Level 20 L  21-32  MMOL/L


 


Anion Gap 10   5-14  MMOL/L


 


Blood Urea Nitrogen 6 L  7-18  MG/DL


 


Creatinine


 0.71 


 


 0.60-1.30


MG/DL


 


Estimat Glomerular Filtration


Rate > 60 


 


  





 


BUN/Creatinine Ratio 8    


 


Glucose Level 94     MG/DL


 


Calcium Level 8.9   8.5-10.1  MG/DL


 


Corrected Calcium 8.6   8.5-10.1  MG/DL


 


Total Bilirubin 0.4   0.1-1.0  MG/DL


 


Aspartate Amino Transf


(AST/SGOT) 25 


 


 5-34  U/L





 


Alanine Aminotransferase


(ALT/SGPT) 15 


 


 0-55  U/L





 


Alkaline Phosphatase 72     U/L


 


Total Protein 7.9   6.4-8.2  GM/DL


 


Albumin 4.4   3.2-4.5  GM/DL


 


Amylase Level 72     U/L


 


Lipase 12   8-78  U/L


 


Urine Color  YELLOW   


 


Urine Clarity  CLEAR   


 


Urine pH  7.0  5-9  


 


Urine Specific Gravity  1.020  1.016-1.022  


 


Urine Protein  NEGATIVE  NEGATIVE  


 


Urine Glucose (UA)  NEGATIVE  NEGATIVE  


 


Urine Ketones  1+ H NEGATIVE  


 


Urine Nitrite  NEGATIVE  NEGATIVE  


 


Urine Bilirubin  NEGATIVE  NEGATIVE  


 


Urine Urobilinogen  0.2  < = 1.0  MG/DL


 


Urine Leukocyte Esterase  NEGATIVE  NEGATIVE  


 


Urine RBC (Auto)  NEGATIVE  NEGATIVE  


 


Urine RBC  NONE   /HPF


 


Urine WBC  RARE   /HPF


 


Urine Squamous Epithelial


Cells 


 2-5 


  /HPF





 


Urine Crystals  NONE   /LPF


 


Urine Bacteria  TRACE   /HPF


 


Urine Casts  NONE   /LPF


 


Urine Mucus  SMALL H  /LPF


 


Urine Culture Indicated  NO   








My Orders





Orders - MEEK CHIU DO


Ed Iv/Invasive Line Start (9/10/20 20:34)


Urine Pregnancy Bedside (9/10/20 20:34)


Amylase (9/10/20 20:34)


Cbc With Automated Diff (9/10/20 20:34)


Comprehensive Metabolic Panel (9/10/20 20:34)


Lipase (9/10/20 20:34)


Ua Culture If Indicated (9/10/20 20:34)


Cbc And Manual Diff (9/10/20 20:30)


Reticulocyte Count (9/10/20 20:30)





Vital Signs/I&O











 9/10/20





 20:27


 


Temp 36.5


 


Pulse 71


 


Resp 20


 


B/P (MAP) 122/80 (94)


 


Pulse Ox 100


 


O2 Delivery Room Air














Blood Pressure Mean:                    94











Progress


Progress Note :  


Progress Note


ON REVIEWING TEST RESULTS WITH PT, SHE STATES THAT SHE HAD ANEMIA WITH BOTH OF 

HER PREVIOUS PREGNANCIES AND REQUIRED TRANSFUSIONS, BUT WAS LATER IN 

PREGNANCY--NOT IN EARLY PREGNANCY. 





PAIN AND NAUSEA EASED DURING ER STAY, WITHOUT TREATMENT





DISCUSSED THE IMPORTANCE OF FOLLOW UP WITH OB/GYN FOR FURTHER CARE


WILL ORDER OUTPATIENT ULTRASOUNDS OF ABDOMEN AND OB ULTRASOUND AS WELL





Departure


Communication (Admissions)


--SPOKE WITH DR. SANDS, HE ADVISES TO HAVE LAB DO PERIPHERAL SMEAR, WILL 

ORDER OUTPATIENT ULTRASOUNDS OF ABDOMEN FOR RUQ PAIN, AND OF PELVIS FOR OB 

EVALUATION. PT TO FOLLOW UP WITH OB OF CHOICE FOR FURTHER CARE. WILL START ON 

IRON IN ADDITION TO PRENATAL VITAMINS.





Impression





   Primary Impression:  


   EPIGASTRIC AND RUQ PAIN --SUSPECT BILIARY COLIC


   Additional Impressions:  


   Positive pregnancy test


   ANEMIA


Disposition:  01 HOME, SELF-CARE


Condition:  Stable





Departure-Patient Inst.


Referrals:  


MACK SANDS DO





[facility]


Patient Instructions:  Anemia Caused by Low Iron, Adult (DC), Gallbladder Diet, 

How to Plan and Prepare for a Healthy Pregnancy, POSS GALLSTONE-W/BILIARY COLIC,

Severe Abdominal Pain, Adult (DC)





Add. Discharge Instructions:  


TYLENOL AS NEEDED FOR PAIN 





CLEAR LIQUIDS--WATER, BROTH, JELLO, GATORADE





BRATS DIET--BANANAS, RICE, APPLESAUCE, TOAST, SALTINES





TAKE COLACE STOOL SOFTENER DAILY





TAKE IRON SUPPLEMENT DAILY, IN ADDITION TO PRENATAL VITAMIN DAILY





CALL IN THE MORNING TO ARRANGE OUTPATIENT ULTRASOUNDS





CALL IN THE MORNING TO FOLLOW UP WITH DR SANDS, OB/GYN, FOR FURTHER CARE, OR 

YOU MAY FOLLOW UP WITH OB/GYN OF CHOICE AT formerly [facility]. 





All discharge instructions reviewed with patient and/or family. Voiced 

understanding.


Scripts


Doxylamine/Pyridoxine HCl (Diclegis Dr 10-10 mg Tablet) 1 Each Tablet.dr


2 EACH PO HS, #60 TAB


   Prov: MEEK CHIU DO         9/10/20 


Ferrous Sulfate (Iron) 325 Mg Tablet


325 MG PO DAILY for 90 Days, #1 TAB


   Prov: MEEK CHIU DO         9/10/20 


Prenatal Vit W-Ca,Fe,FA(<1 mg) (Prenatal Vitamins) 1 Each Tablet


1 EACH PO DAILY for 90 Days, #1 TAB


   Prov: MEEK CHIU DO         9/10/20











MEEK CHIU DO                 Sep 10, 2020 20:46

## 2020-09-16 NOTE — DIAGNOSTIC IMAGING REPORT
INDICATION: Right upper quadrant pain.



The liver measures 14.7 cm in size. There is an irregular area of

hypoechogenicity in the right lobe of the liver measuring 3.1 x

2.6 x 2.9 cm. No other liver masses are seen. There are some

echogenic foci within the gallbladder suggestive of stones. No

wall thickening or biliary ductal dilatation is identified.

Visualized pancreas is unremarkable. Spleen is normal in size at

9.7 cm. Aorta is nonaneurysmal. IVC is patent. Both right and

left kidneys are without calculi or hydronephrosis. There is no

ascites.



IMPRESSION: Hypoechoic complex right lobe liver mass

corresponding to the lesion noted on CT study from 07/18/2020.

Size of lesion is similar to the examination 2 months earlier and

remains indeterminate. This again is indeterminate between a

potential neoplastic process versus an infectious/inflammatory

process such as liver abscess. Continued close follow-up would be

recommended to confirm stability. Cholelithiasis without evidence

of acute cholecystitis.



Dictated by: 



  Dictated on workstation # [id number]

## 2020-09-16 NOTE — DIAGNOSTIC IMAGING REPORT
PROCEDURE: 

US OB SINGLE FETUS <14 WKS.



TECHNIQUE: 

Multiple Real-time grayscale images were obtained over the gravid

uterus in various projections. 



INDICATION: 

Newly diagnosed pregnancy.



FINDINGS:

There is an intrauterine gestational sac containing a fetal pole.

The crown-rump length measurement is approximately 10 mm,

consistent with a 7 week 1 day gestation. The fetal heart rate

was recorded at 147 BPM. No zandra-gestational sac hemorrhage is

detected. The left ovary was not visualized. The right ovary does

contain a 2.3 cm cyst. There is no free fluid.



IMPRESSION: 

1. Single live IUP of 7 weeks 1 day gestational age. The

estimated date of confinement sonographically is 05/04/2021.



2. There is a 2.3 cm right ovarian cyst.



Dictated by: 



  Dictated on workstation # [id number]

## 2020-10-07 NOTE — DIAGNOSTIC IMAGING REPORT
PROCEDURE: US OB SINGLE FETUS <14 WKS.



TECHNIQUE: Multiple real-time grayscale images were obtained over

the gravid uterus in various projections. 



INDICATION: No fetal heart tones in doctor's office.



FINDINGS: Uterus measures 11.9 x 7.8 x 8.0 cm. There is an

intrauterine gestational sac containing a fetal pole. Crown-rump

length measurement is 23 mm consistent with 9 weeks 0 days

gestation. However, no fetal heart tones are identified,

consistent with embryonic demise. No zandra-gestational sac

hemorrhage is detected. Adnexa are unremarkable.



IMPRESSION: Findings consistent with 9 week 0 day intrauterine

embryonic demise. No other abnormality is identified.



Dictated by: 



  Dictated on workstation # [id number]

## 2020-10-09 NOTE — DISCHARGE INST-WOMEN'S SERVICE
Discharge Inst-Women's Serv


Depart Medication/Instructions


New, Converted or Re-Newed RX:  RX on Chart


Final Diagnosis


Suction D and C po


Problems Reviewed?:  Yes





Consults/Follow Up


Additional Follow Up:  Yes


Orders/Referrals


Dr. Sands in 2-3 weeks





Activity


Activity:  Activity as Tolerated


Driving Instructions:  No Driving for 1 Week


NO SMOKING:  NO SMOKING


Nothing Inside Vagina:  No Douching, No Onsted, No Tampons





Diet


Discharge Diet:  No Restrictions


Symptoms to Report to :  Bleeding Excessive, Pain Increased, Fever Over 101 

Degrees F, Vaginal Bleeding Increase, Questions/Concerns


For Any Problems or Questions:  Contact Your Physician











MACK SANDS DO             Oct 9, 2020 07:28

## 2020-10-09 NOTE — OPERATIVE REPORT
DATE OF SERVICE:  



PREOPERATIVE DIAGNOSES:

1.  A 30-year-old G3, P2 at 9 weeks gestation.

2.  Missed .



POSTOPERATIVE DIAGNOSES:

1.  A 30-year-old G3, P2 at 9 weeks gestation.

2.  Missed .



PROCEDURE:

Suction D and C.



SURGEON:

Holden English DO



ANESTHESIA:

General LMA.



ESTIMATED BLOOD LOSS:

300 mL.



URINE OUTPUT:

50 mL, clear drained at the start of the procedure.



FLUIDS:

800 mL lactated Ringer's solution.



FINDINGS:

Moderate to large amount of products of conception, grossly normal appearing

external female genitalia.



SPECIMEN SENT:

Products of conception.



INDICATION FOR PROCEDURE:

This 30-year-old female is a patient who had seen me earlier in the week for

first prenatal care visit.  Her hemoglobin was found to be extremely low at that

visit and a acosta pregnancy was noted with no fetal cardiac activity.  This

was confirmed with ultrasound at the hospital.  I discussed with the patient my

concern of her miscarrying on her own at home and the significant amount of

blood loss could occur with her hemoglobin being so low.  I discussed with the

patient proceeding with this surgery in this circumstance, doing a suction D and
C.  Risks

of procedure were discussed with the patient in detail.  After all of her

questions were answered, consent was obtained in the preoperative area and the

patient was taken to the operating room.



OPERATIVE REPORT IN DETAIL:

Once in the operating room, anesthesia was found to be adequate.  She was placed

in dorsal lithotomy position, prepped and draped in normal sterile fashion and

the bladder is drained using straight catheterization.  Weighted speculum was

inserted to the patient's vagina.  Right angle retractor was used to visualize

the cervix, which was grasped at 12 o'clock position using a long Allis clamp. 

I then placed two lateral sutures at 3 and 9 o'clock positions on the cervix to

control bleeding and to partially ligate the uterine vessels to the cervix and

uterus as I anticipated blood loss from this procedure, and the patient

significantly low hemoglobin.  Both of these are placed in a ligating fashion,

securing the lateral aspect of the cervix, after which I then gently sound the

uterine cavity, depth was found to be approximately 10 cm.  I selected a #10

rigid curved [place] suction device tip and gently dilated the cervix using

Hanks dilators to approximately 1 cm.  I then advanced the suction tip into the

uterine cavity.  I applied the Rafa device.  The suction Rafa is then

activated at a maximum suction of 65 mmHg is applied.  I then gently on several

passes, cleared the endometrium and the uterus of all products of conception

once this was done on several occasions and there is little to no tissue being

produced through the tubing system.  I then gently performed a light sharp

curettage of the endometrium after which the bleeding slows down.  To ensure

hemostasis, 0.2 mg of Methergine are given IM during the procedure.  I then

removed all the other instruments from the patient's vagina.  The patient

tolerated the procedure well and sent to recovery area in stable condition.  Lap

and sponge counts were correct at the end of the procedure.  Instrument counts

correct as well.





Job ID: [id number]

DocumentID: [id number]

Dictated Date:  10/09/2020 08:57:23

Transcription Date: 10/09/2020 13:34:17

Dictated By: DO DEANNA CURTIS

## 2020-10-09 NOTE — PROGRESS NOTE-PRE OPERATIVE
Pre-Operative Progress Note


H&P Reviewed


The H&P was reviewed, patient examined and no changes noted.


Date Seen by Provider:  Oct 9, 2020


Time Seen by Provider:  07:15


Date H&P Reviewed:  Oct 9, 2020


Time H&P Reviewed:  07:15


Pre-Operative Diagnosis:  Missed MACK Chawla DO             Oct 9, 2020 07:27

## 2020-10-09 NOTE — ANESTHESIA-GENERAL POST-OP
General


Patient Condition


Mental Status/LOC:  Same as Preop


Cardiovascular:  Satisfactory


Nausea/Vomiting:  Absent


Respiratory:  Satisfactory


Pain:  Controlled


Complications:  Absent





Post Op Complications


Complications


None





Follow Up Care/Instructions


Patient Instructions


None needed.





Anesthesia/Patient Condition


Patient Condition


Patient is doing well, no complaints, stable vital signs, no apparent adverse 

anesthesia problems.   


No complications reported per nursing.











ARY DAVID CRNA             Oct 9, 2020 08:04

## 2021-06-20 NOTE — XMS REPORT
[facility]

                             Created on: 2019



RomyLeida

External Reference #: [id number]

: 1990

Sex: Female



Demographics





                          Address                   [address] [place]  [address]

 

                          Preferred Language        Unknown

 

                          Marital Status            Unknown

 

                          Hoahaoism Affiliation     Unknown

 

                          Race                      Unknown

 

                          Ethnic Group              Unknown





Author





                          Author                    Leida Mullins Doctor

 

                          Organization              [facility]

 

                          Address                   Unknown

 

                          Phone                     Unavailable







Care Team Providers





                    Care Team Member Name Role                Phone

 

                    Migration,  Doctor  Unavailable         Unavailable







PROBLEMS





          Type      Condition ICD9-CM Code ITI12-QU Code Onset Dates Condition S

tatus SNOMED 

Code

 

             Problem      Encounter for insertion of intrauterine contraceptive 

device              Z30.430       

                          Active                    [id number]

 

          Problem   Microcytic anemia           D50.9               Active    23

[id number]

 

          Problem   Itching             L29.9               Active    [id number]

 

          Problem   Bug bites           W57.XXXA            Active    [id number]







ALLERGIES

No Information



ENCOUNTERS





                Encounter       Location        Date            Diagnosis

 

                          [facility]  [address] [facility] [id number]

[address] [place] [address] 

[phone]                31 Oct, 2018               

 

                          [facility]     [address] [facility] [id number]

[address] [place] [address]

                          24 Oct, 2018              Prenatal care in second trim

orville Z34.92

 

                          [facility]     [address] [facility] [id number]

[address] [place] [phone]

                          19 Oct, 2018              Microcytic anemia D50.9

 

                          [facility]     [address] [facility] [id number]

[address] [place] [address]

                          03 Oct, 2018              Microcytic anemia D50.9

 

                          [facility]     [address] [facility] [id number]

[address] [place] [address]

                          26 Sep, 2018              Multigravida in first trimes

ter Z34.81 and Normal pregnancy in 

multigravida Z34.80

 

                          [facility]     [address] [facility] [id number]

[address] [place] [address]

                          20 Sep, 2018               

 

                          [facility]     [address] [facility] [id number]

[address] [place] [address]

                          14 Sep, 2018               

 

                          [facility]     [address] [facility] [id number]

[address] [place] [address]

                          11 Sep, 2018               

 

                          [facility]     [address] [facility] [id number]

[address] [place] [address]

                          07 Sep, 2018              Encounter for pregnancy test

 Z32.00

 

                          [facility]  [address] [facility] [id number]

[address] [place] [address] 

[address]                              Irritant contact dermatitis 

due to other chemical 

products L24.5

 

                          [place] [address] [facility] [id number]

[address] [place] [address]

                                        Frequent headaches R51

 

                          [facility]  [address] [facility] [id number]

[address] [place] [address] 

[address]                              Frequent headaches R51

 

                          [facility]  [facility] [address] [facility] [id number]

[address] [place] [address] 

[address]                              Other viral agents as the ca

use of diseases classified 

elsewhere B97.89 and Acute upper respiratory infection, unspecified J06.9

 

                          [place] [address] [place] [id number]

[address] [place] [address]

                                        Pyelonephritis N12

 

                          [place] [address] [facility] [id number]

[address] [place] [address]

                                        Leukorrhea N89.8 ; Acute vag

initis N76.0 and Other specified 

bacterial agents as the cause of diseases classified elsewhere B96.89

 

                          [place] [address] [facility] [id number]

[address] [place] [address]

                          29 Mar, 2016              Right ovarian cyst N83.20

 

                          [place] [address] [place] [id number]

[address] [place] [address]

                                         

 

                          [place] [address] [facility] [id number]

[address] [place] [address]

                                         

 

                          [place] [address] [place] [id number]

[address] [place] [address]

                                        Right ovarian cyst N83.20

 

                          [place] [address] [place] [id number]

[address] [place] [address]

                                        Encounter for insertion of i

ntrauterine contraceptive device 

Z30.430

 

                          [place] [address]

[address] [place] [address]

                          07 2016              Encounter for counseling reg

arding contraception Z30.9

 

                          [place] [address] [address]

                          04 2016               

 

                          [place] [address] [address]

                          18 Dec, 2015              Well woman exam Z01.419 ; We

ight gain R63.5 and BMI 27.0-27.9,adult

Z68.27

 

                          [place] [address] [address]

                          18 Dec, 2015              Erythema nodosum L52 and Fat

igue R53.83

 

                          [place] [address] [address]

                          16 Dec, 2015              Erythema nodosum L52

 

                          [place] [address] [address]

                          30 2015              General counseling and advic

e for contraceptive management Z30.09 

and OCP (oral contraceptive pills) initiation Z30.011

 

                          [address] [address]

                          27 2015              Bug bites W57.XXXA and Itchi

ng L29.9

 

                          [place] [id number] [place] [address]

                          19 2015              Well woman exam Z01.419 ; We

ight gain R63.5 and BMI 27.0-27.9,adult

Z68.27

 

                          [place] [address]

[address] [place] [address]

                          27 Oct, 2015              Ankle pain, left M25.572

 

                          [facility]   [address]

[address] [place] [id number]

                          12 May, 2015              Dental examination V72.2

 

                          [facility]   [address] [place] [id number]

[address] [place] [id number]

                          08 May, 2015              Dental examination V72.2

 

                          [facility]     [address] [id number]

[address] [place] [address], [place] [address]

                          14 2015               

 

                          [facility]     [address] [id number]

[address] [place] [address], [place] [address]

                                         

 

                          [facility]     [address] [id number]

[address] [place] [address], [place] [address]

                          ,                

 

                          [facility]     [address] [id number]

[address] [place] [address], [place] [address]

                          ,                

 

                          [facility]     [address] [id number]

[address] [place] [address], [place] [address]

                          ,                

 

                          [facility]     [address] [id number]

[address] [place] [address], [place] [address]

                          ,                

 

                          [facility]     [address] [id number]

[address] [place] [address], [place] [address]

                                         

 

                          [facility]     [address] [id number]

[address] [place] [address]

                                         

 

                          [facility]     [address] [id number]

[address] [place] [address], [place] [address]

                          09 Oct, 2014               

 

                          [facility]     [address] [id number]

[address] [place] [address]

                          09 Oct, 2014               

 

                          [facility]     [address] [id number]

[address] [place] [address]

                          29 Sep, 2014               

 

                          [facility]     [address] [id number]

[address] [place] [address]

                          29 Sep, 2014               

 

                          [facility]     [address] [id number]

[address] [place] [address], [place] [address]

                          22 Sep, 2014               

 

                          [facility]     [address] [id number]

[address] [place] [address], [place] [address]

                          22 Sep, 2014               

 

                          [facility]     [address] [id number]

[address] [place] [address], [place] [address]

                          22 Aug, 2014               

 

                          [facility]     [address] [id number]

[address] [place] [address]

                          22 Aug, 2014               

 

                          [facility]     [address] [id number]

[address] [place] [address], [place] [address]

                          19 May, 2014               

 

                          [facility]     [address] [id number]

[address] [place] [address], [place] [address]

                          19 May, 2014               

 

                          [facility]     [address] [id number]

[address] [place] [address], [place] [address]

                          10 Apr, 2014               

 

                          [facility]     [address] [id number]

[address] [place] [address], [place] [address]

                          10 Apr, 2014               

 

                          [facility]     [address] [id number]

[address] [place] [address], [place] [address]

                                         

 

                          [facility]     [address] [id number]

[address] [place] [address], [place] [address]

                                         

 

                          [facility]     [address] [id number]

[address] [place] [address], [place] [address]

                                         

 

                          [facility]     [address] [id number]

[address] [place] [address], [place] [address]

                                         

 

                          [facility]     [address] [id number]

[address] [place] [address], [place] [address]

                          10 Feb, 2014               

 

                          [facility]     [address] [id number]

[address] [place] [address], [place] [address]

                          10 Feb, 2014               

 

                          [facility]     [address] [id number]

[address] [place] [address], [place] [address]

                                         

 

                          [facility]     [address] [id number]

[address] [place] [address], [place] [address]

                                         

 

                          [facility]     [address] [id number]

[address] [place] [address], [place] [address]

                                         

 

                          [facility]     [address] [id number]

[address] [place] [address], [place] [address]

                                         

 

                          [facility]     [address] [id number]

[address] [place] [address], [place] [address]

                                         

 

                          [facility]     [address] [id number]

[address] [place] [address], [place] [address]

                                         

 

                          [facility]     [address] [id number]

[address] [place] [address], [place] [address]

                                         

 

                          [facility]     [address] [id number]

[address] [place] [address], [place] [address]

                                         

 

                          [facility]     [address] [id number]

[address] [place] [address], [place] [address]

                                         

 

                          [facility]     [address] [id number]

[address] [place] [address], [place] [address]

                                         

 

                          [facility]     [address] [id number]

[address] [place] [address], [place] [address]

                                         

 

                          [facility]     [address] [id number]

[address] [place] [address], [place] [address]

                                         

 

                          [facility]     [address] [id number]

[address] [place] [address], [place] [address]

                          28 Mar, 2013               

 

                          [facility]     [address] [id number]

[address] [place] [address], [place] [address]

                          25 Mar, 2013               

 

                          [facility]     [address] [id number]

[address] [place] [address], [place] [address]

                          07 Mar, 2013               

 

                          [facility]     [address] [id number]

[address] [place] [address], [place] [address]

                          04 Mar, 2013               

 

                          [facility]     [address] [id number]

[address] [place] [address], [place] [address]

                          01 Mar, 2013               

 

                          [facility]     [address] [id number]

[address] [place] [address], [place] [address]

                                         

 

                          [facility]     [address] [id number]

[address] [place] [address], [place] [address]

                                         

 

                          [facility]     [address] [id number]

[address] [place] [address], [place] [address]

                                         

 

                          [facility]     [address] [id number]

[address] [place] [address]

                          25 Oct, 2012               

 

                          [facility]     [address] [facility] [id number]

[address] [place] [address]

                          25 Oct, 2012               

 

                          [facility]     [address] [facility] [id number]

[address] [place] [address]

                          03 Oct, 2012               

 

                          [facility]     [address] [facility] [id number]

[address] [place] [address]

                                         

 

                          [facility]     [address] [facility] [id number]

[address] [place] [address]

                                         







IMMUNIZATIONS

No Known Immunizations



SOCIAL HISTORY

Never Assessed



REASON FOR VISIT

[facility]



[place]AN OF CARE





VITAL SIGNS





MEDICATIONS

Unknown Medications



RESULTS

No Results



PROCEDURES

No Known procedures



INSTRUCTIONS





MEDICATIONS ADMINISTERED

No Known Medications



MEDICAL (GENERAL) HISTORY





                    Type                Description         Date

 

                    Surgical History    No know Surgical history  

 

                    Hospitalization History childbirth